# Patient Record
Sex: FEMALE | Race: BLACK OR AFRICAN AMERICAN | Employment: OTHER | URBAN - METROPOLITAN AREA
[De-identification: names, ages, dates, MRNs, and addresses within clinical notes are randomized per-mention and may not be internally consistent; named-entity substitution may affect disease eponyms.]

---

## 2017-04-25 ENCOUNTER — HOSPITAL ENCOUNTER (OUTPATIENT)
Dept: MAMMOGRAPHY | Age: 72
Discharge: HOME | End: 2017-04-25
Attending: OBSTETRICS & GYNECOLOGY
Payer: COMMERCIAL

## 2017-04-25 DIAGNOSIS — Z12.31 VISIT FOR SCREENING MAMMOGRAM: ICD-10-CM

## 2017-04-25 PROCEDURE — 77067 SCR MAMMO BI INCL CAD: CPT

## 2017-06-28 DIAGNOSIS — R00.1 BRADYCARDIA: ICD-10-CM

## 2017-06-28 DIAGNOSIS — B37.31 VAGINAL YEAST INFECTION: ICD-10-CM

## 2017-06-28 DIAGNOSIS — K92.1 HEMATOCHEZIA: ICD-10-CM

## 2017-06-28 DIAGNOSIS — L65.9 HAIR LOSS: ICD-10-CM

## 2017-06-28 DIAGNOSIS — M54.2 NECK PAIN: ICD-10-CM

## 2017-06-28 DIAGNOSIS — R10.9 FLANK PAIN: ICD-10-CM

## 2017-06-28 PROBLEM — H66.90 OTITIS MEDIA, ACUTE: Status: ACTIVE | Noted: 2017-06-28

## 2017-06-28 PROBLEM — S00.03XA TRAUMATIC HEMATOMA OF SCALP: Status: ACTIVE | Noted: 2017-06-28

## 2017-06-28 PROBLEM — I10 HTN (HYPERTENSION): Status: ACTIVE | Noted: 2017-06-28

## 2017-06-28 PROBLEM — J45.909 ASTHMA: Status: ACTIVE | Noted: 2017-06-28

## 2017-06-28 PROBLEM — J18.9 PNEUMONIA: Status: ACTIVE | Noted: 2017-06-28

## 2017-06-28 PROBLEM — M81.0 OSTEOPOROSIS: Status: ACTIVE | Noted: 2017-06-28

## 2017-06-28 PROBLEM — J30.9 ALLERGIC RHINITIS: Status: ACTIVE | Noted: 2017-06-28

## 2017-06-28 PROBLEM — K51.90 ULCERATIVE COLITIS (HCC): Status: ACTIVE | Noted: 2017-06-28

## 2017-06-28 PROBLEM — E78.5 HYPERLIPIDEMIA: Status: ACTIVE | Noted: 2017-06-28

## 2017-06-28 PROBLEM — E87.6 HYPOKALEMIA: Status: ACTIVE | Noted: 2017-06-28

## 2017-06-28 PROBLEM — M19.90 DEGENERATIVE ARTHRITIS: Status: ACTIVE | Noted: 2017-06-28

## 2017-06-28 PROBLEM — H26.9 CATARACT, BILATERAL: Status: ACTIVE | Noted: 2017-06-28

## 2017-06-28 PROBLEM — R53.83 FATIGUE: Status: ACTIVE | Noted: 2017-06-28

## 2017-06-28 PROBLEM — K50.90 CROHN'S DISEASE (HCC): Status: ACTIVE | Noted: 2017-06-28

## 2017-06-28 RX ORDER — ALBUTEROL SULFATE 90 UG/1
AEROSOL, METERED RESPIRATORY (INHALATION) AS NEEDED
COMMUNITY

## 2017-06-28 RX ORDER — PREDNISONE 10 MG/1
TABLET ORAL
COMMUNITY
End: 2017-07-13

## 2017-06-28 RX ORDER — CEFDINIR 300 MG/1
300 CAPSULE ORAL 2 TIMES DAILY
COMMUNITY
End: 2017-07-13 | Stop reason: ALTCHOICE

## 2017-06-28 RX ORDER — CYCLOSPORINE 0.5 MG/ML
1 EMULSION OPHTHALMIC 2 TIMES DAILY
COMMUNITY

## 2017-07-13 ENCOUNTER — OFFICE VISIT (OUTPATIENT)
Dept: INTERNAL MEDICINE CLINIC | Age: 72
End: 2017-07-13

## 2017-07-13 VITALS
DIASTOLIC BLOOD PRESSURE: 70 MMHG | WEIGHT: 142 LBS | BODY MASS INDEX: 26.13 KG/M2 | SYSTOLIC BLOOD PRESSURE: 140 MMHG | HEIGHT: 62 IN

## 2017-07-13 DIAGNOSIS — I10 ESSENTIAL HYPERTENSION: ICD-10-CM

## 2017-07-13 DIAGNOSIS — J45.40 MODERATE PERSISTENT ASTHMA WITHOUT COMPLICATION: ICD-10-CM

## 2017-07-13 DIAGNOSIS — K51.20 ULCERATIVE PROCTITIS WITHOUT COMPLICATION (HCC): Primary | ICD-10-CM

## 2017-07-13 DIAGNOSIS — E78.2 MIXED HYPERLIPIDEMIA: ICD-10-CM

## 2017-07-13 RX ORDER — NYSTATIN AND TRIAMCINOLONE ACETONIDE 100000; 1 [USP'U]/G; MG/G
CREAM TOPICAL AS NEEDED
COMMUNITY
Start: 2017-07-07

## 2017-07-13 RX ORDER — DUREZOL 0.5 MG/ML
1 EMULSION OPHTHALMIC
COMMUNITY
Start: 2017-05-01 | End: 2020-09-21 | Stop reason: ALTCHOICE

## 2017-07-13 RX ORDER — ESTRADIOL 10 UG/1
1 TABLET VAGINAL
COMMUNITY
Start: 2017-06-28 | End: 2021-11-17 | Stop reason: SDUPTHER

## 2017-07-13 RX ORDER — SYRINGE WITH NEEDLE, 1 ML 28GX1/2"
SYRINGE, EMPTY DISPOSABLE MISCELLANEOUS
Refills: 99 | COMMUNITY
Start: 2017-04-19

## 2017-07-13 NOTE — PROGRESS NOTES
Reviewed record in preparation for visit and have obtained necessary documentation. Identified pt with two pt identifiers(name and ). Chief Complaint   Patient presents with    Hypertension     yearaly follow up    Cholesterol Problem        Coordination of Care Questionnaire:  :     1) Have you been to an emergency room, urgent care clinic since your last visit? no   Hospitalized since your last visit? no             2) Have you seen or consulted any other health care providers outside of [address] [place] [address] since your last visit? no  (Include any pap smears or colon screenings in this section.)    No refill at this time.     Here for 6 mo follow up, doing well except for some congestion

## 2017-07-13 NOTE — PROGRESS NOTES
Benita Enrique is a 71 y.o. female and presents with Hypertension (yearaly follow up) and Cholesterol Problem  . Subjective:  Mrs. Maryjo Osler presents for follow up regarding her HTN and other medical problems. She has no headache, SOB, CP, edema or lightheadedness. She is doing well on her medication and is without side effects. She incidentally notes some sinus congestion and drainage from a cold she picked up while out of town last week. This is getting better. Her asthma has not acted up. Review of Systems  Constitutional: negative for fevers, chills, anorexia and weight loss  Eyes:   negative for visual disturbance and irritation  ENT:   negative for tinnitus,sore throat, ear pains. hoarseness  Respiratory:  negative for cough, hemoptysis, dyspnea,wheezing  CV:   negative for chest pain, palpitations, lower extremity edema  GI:   negative for nausea, vomiting, diarrhea, abdominal pain,melena  Endo:               negative for polyuria,polydipsia,polyphagia,heat intolerance  Genitourinary: negative for frequency, dysuria and hematuria  Integumentary: negative for rash and pruritus  Hematologic:  negative for easy bruising and gum/nose bleeding  Musculoskel: negative for myalgias, arthralgias, back pain, muscle weakness, joint pain  Neurological:  negative for headaches, dizziness, vertigo, memory problems and gait   Behavl/Psych: negative for feelings of anxiety, depression, mood changes    Past Medical History:   Diagnosis Date    Allergic rhinitis 6/28/2017    Arthritis     Asthma     Asthma 6/28/2017    Impressio: continue nebs and Dulera, prednisone taper    Bradycardia 6/28/2017    Impression: asymptomatic, continue Bystolic    Cataract, bilateral 6/28/2017    Impression: low risk for elective surgical procedure, proceed without further risk stratification, EKG shows sinus domingo without acute ST-T changes which is her baseline    Crohn's disease ([address]     Crohn's disease ([facility] 6/28/2017 Impression: GI following    Degenerative arthritis 6/28/2017    Impression: trial of topical NSAID, if persists can consider an injection    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, 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    Fatigue 6/28/2017    Flank pain 6/28/2017    Impression: most likely secondary to Crohn's, not cardiac by history, EKG normal Status is Inactive      Gastrointestinal disorder     crones    Hair loss 6/28/2017    Impression: refer to derm    Hematochezia 6/28/2017    Status is Inactive    Herpes Dx 2015    no outbreak as of 5/2/16    Hyperlipidemia 6/28/2017    Hypertension     Hypokalemia 6/28/2017    MRSA (methicillin resistant staph aureus) culture positive \"many years ago\" as of 5/2/16    pt states + nasal swab \"many years ago\", Tx and no + since; UNCONFIRMED    Neck pain 6/28/2017    Impresssion: finish PT, continue HEP, pain med prn    Osteoporosis 6/28/2017    Otitis media, acute 6/28/2017    Impression: follow up next week, finish abx Status is Inactive    Pneumonia 6/28/2017    Impression:  Completed course of antibiotics, clinically resolved Status is Inactive    Traumatic hematoma of scalp 6/28/2017    Status is Inactive    Ulcerative colitis ([facility] 6/28/2017    Vaginal yeast infection 6/28/2017    Impression, consider probiotic, check blood sugar, follow up GYN Status is Inactive     Past Surgical History:   Procedure Laterality Date    HX CATARACT REMOVAL Right 3/28/16    HX MOHS PROCEDURES Right     HX ORTHOPAEDIC Left     left knee sx    HX ORTHOPAEDIC Right     trigger release      HX TUBAL LIGATION       Social History     Social History    Marital status:      Spouse name: N/A    Number of children: N/A    Years of education: N/A     Social History Main Topics    Smoking status: Never Smoker    Smokeless tobacco: Never Used    Alcohol use No    Drug use: No    Sexual activity: No     Other Topics Concern    None     Social History Narrative     Family History   Problem Relation Age of Onset    Breast Cancer Maternal Aunt      ?  Heart Disease Mother     Asthma Father     Stroke Father      Current Outpatient Prescriptions   Medication Sig Dispense Refill    YUVAFEM 10 mcg tab vaginal tablet Insert 1 Tab into vagina every Monday and Friday.  DUREZOL 0.05 % ophthalmic emulsion 1 Drop by Does Not Apply route every Monday and Friday.  nystatin-triamcinolone (MYCOLOG II) topical cream as needed.  ALLERGIST TRAY 1CC 27GX1/2\" 1 mL 27 x 1/2\" syrg every seven (7) days. 99    Nebulizer Accessories kit Use as directed 1 Kit 3    cycloSPORINE (RESTASIS) 0.05 % ophthalmic emulsion Administer 1 Drop to both eyes two (2) times a day.  albuterol (PROAIR HFA) 90 mcg/actuation inhaler Take  by inhalation as needed for Wheezing.  valACYclovir (VALTREX) 1 gram tablet Take 1,000 mg by mouth daily.  nebivolol (BYSTOLIC) 10 mg tablet Take 10 mg by mouth daily.  amLODIPine-Olmesartan 5-40 mg tab Take 1 Tab by mouth daily.  mometasone-formoterol (DULERA) 100-5 mcg/actuation HFA inhaler Take 2 puffs by inhalation two (2) times a day.  mesalamine EC (ASACOL) 400 mg EC tablet Take 800 mg by mouth three (3) times daily.  hyoscyamine SL (LEVSIN/SL) 0.125 mg SL tablet 0.125 mg by SubLINGual route every four (4) hours.  CALCIUM CARBONATE (CALCIUM 300 PO) Take  by mouth daily.  pirbuterol (MAXAIR AUTOHALER) 200 mcg/Inhalation inhaler Take 2 Puffs by inhalation four (4) times daily as needed.  Omeprazole delayed release (PRILOSEC D/R) 20 mg tablet Take 20 mg by mouth daily.  estradiol (VAGIFEM) 25 mcg vaginal tablet Insert 25 mcg into vagina two (2) days a week.  olopatadine (PATANASE) 0.6 % Spry 2 Squirts by Both Nostrils route two (2) times a day.  biotin 2,500 mcg Tab Take 5,000 mcg by mouth daily.          Allergies   Allergen Reactions    Latex Rash    Aldactone [Spironolactone] Hives    Chocolate [Cocoa] Rash    Cinnamon Rash    Hydrocodone Other (comments)     Vomiting, syncope    Lisinopril Hives       Objective:  Visit Vitals    /70 (BP 1 Location: Right arm, BP Patient Position: Sitting)    Ht 5' 2\" (1.575 m)    Wt 142 lb (64.4 kg)    BMI 25.97 kg/m2     Physical Exam:   General appearance - alert, well appearing, and in no distress  Mental status - alert, oriented to person, place, and time  EYE-YUDELKA, EOMI, fundi normal, corneas normal, no foreign bodies  ENT-ENT exam normal, no neck nodes or sinus tenderness  Nose - normal and patent, no erythema, discharge or polyps  Mouth - mucous membranes moist, pharynx normal without lesions  Neck - supple, no significant adenopathy   Chest - clear to auscultation, no wheezes, rales or rhonchi, symmetric air entry   Heart - normal rate, regular rhythm, normal S1, S2, no murmurs, rubs, clicks or gallops   Abdomen - soft, nontender, nondistended, no masses or organomegaly  Lymph- no adenopathy palpable  Ext-peripheral pulses normal, no pedal edema, no clubbing or cyanosis  Skin-Warm and dry. no hyperpigmentation, vitiligo, or suspicious lesions  Neuro -alert, oriented, normal speech, no focal findings or movement disorder noted  Musculoskeletal- FROM, no bony abnormalities, no point tenderness    No results found for this visit on 07/13/17. Assessment/Plan:    Problem List Items Addressed This Visit     Moderate persistent asthma without complication    Ulcerative proctitis without complication ([facility] [address] - Primary    Relevant Orders    AMB POC COMPLETE CBC,AUTOMATED ENTER    Essential hypertension    Relevant Orders    AMB POC COMPREHENSIVE METABOLIC PANEL    AMB POC URINALYSIS DIP STICK AUTO W/ MICRO     Mixed hyperlipidemia    Relevant Orders    AMB POC LIPID PROFILE          There are no Patient Instructions on file for this visit.    Follow-up Disposition: Not on File      I have reviewed with the patient details of the assessment and plan and all questions were answered. Relevent patient education was performed. The most recent lab findings were reviewed with the patient. An After Visit Summary was printed and given to the patient.       Aria Roa MD

## 2017-07-13 NOTE — MR AVS SNAPSHOT
Visit Information Date & Time Provider Department Dept. Phone Encounter #  
 7/13/2017  2:00 PM MANDY Hu MD [facility] [id number] Follow-up Instructions Return in about 6 months (around 1/13/2018) for follow up. Follow-up and Disposition History Your Appointments 7/18/2017 10:00 AM  
LAB with MANDY Hu MD  
Sudarshan Vigil  ([address] [facility]) Appt Note: Fasting labs [address] [id number] [address] [facility] [address] Upcoming Health Maintenance Date Due Hepatitis C Screening 1945 DTaP/Tdap/Td series (1 - Tdap) 5/28/1967 FOBT Q 1 YEAR AGE 50-75 5/28/1996 ZOSTER VACCINE AGE 60> 5/28/2006 GLAUCOMA SCREENING Q2Y 5/28/2011 Pneumococcal 65+ Low/Medium Risk (1 of 2 - PCV13) 5/28/2011 MEDICARE YEARLY EXAM 5/28/2011 INFLUENZA AGE 9 TO ADULT 8/1/2017 BREAST CANCER SCRN MAMMOGRAM 4/25/2019 Allergies as of 7/13/2017  Review Complete On: 7/13/2017 By: Teri Bergeron MD  
  
 Severity Noted Reaction Type Reactions Latex  03/24/2016    Rash Aldactone [Spironolactone]  03/24/2016    Hives Chocolate [Cocoa]  03/24/2016    Rash Cinnamon  05/09/2016    Rash Hydrocodone  03/01/2012    Other (comments) Vomiting, syncope Lisinopril  03/24/2016    Hives Current Immunizations  Never Reviewed No immunizations on file. Not reviewed this visit You Were Diagnosed With   
  
 Codes Comments [facility]ative pro[facility]itis without complication [facility]    -  Primary ICD-10-CM: C62.76 ICD-9-CM: 556.2 Essential hypertension     ICD-10-CM: I10 
ICD-9-CM: 401.9 Mixed hyperlipidemia     ICD-10-CM: E78.2 ICD-9-CM: 272.2 Moderate persistent asthma without complication     BKQ-87-JN: J45.40 ICD-9-CM: 493.90 Vitals BP Height(growth percentile) Weight(growth percentile) BMI OB Status Smoking Status 140/70 5' 2\" (1.575 m) 142 lb (64.4 kg) 25.97 kg/m2 Postmenopausal Never Smoker Vitals History BMI and BSA Data Body Mass Index Body Surface Area  
 25.97 kg/m 2 1.68 m 2 Preferred Pharmacy Pharmacy Name Phone [id number]- [place], [place] - [address] [phone] Your Updated Medication List  
  
   
This list is accurate as of: 7/13/17  4:41 PM.  Always use your most recent med list.  
  
  
  
  
 Allergist Tray 1cc 27Gx1/2\" 1 mL 27 x 1/2\" Syrg Generic drug:  Syringe with Needle (Disp)  
every seven (7) days. amLODIPine-Olmesartan 5-40 mg Tab Take 1 Tab by mouth daily. ASACOL 400 mg EC tablet Generic drug:  mesalamine EC Take 800 mg by mouth three (3) times daily. biotin 2,500 mcg Tab Take 5,000 mcg by mouth daily. CALCIUM 300 PO Take  by mouth daily. DULERA 100-5 mcg/actuation HFA inhaler Generic drug:  mometasone-formoterol Take 2 puffs by inhalation two (2) times a day. DUREZOL 0.05 % ophthalmic emulsion Generic drug:  Difluprednate 1 Drop by Does Not Apply route every Monday and Friday. * YUVAFEM 10 mcg Tab vaginal tablet Generic drug:  estradiol Insert 1 Tab into vagina every Monday and Friday. * estradiol 25 mcg vaginal tablet Commonly known as:   Pew Insert 25 mcg into vagina two (2) days a week.  
  
 hyoscyamine SL 0.125 mg SL tablet Commonly known as:  LEVSIN/SL  
0.125 mg by SubLINGual route every four (4) hours. MAXAIR AUTOHALER 200 mcg/Inhalation inhaler Generic drug:  pirbuterol Take 2 Puffs by inhalation four (4) times daily as needed. nebivolol 10 mg tablet Commonly known as:  BYSTOLIC Take 10 mg by mouth daily. Nebulizer Accessories Kit Use as directed  
  
 nystatin-triamcinolone topical cream  
 Commonly known as:  MYCOLOG II  
as needed. Omeprazole delayed release 20 mg tablet Commonly known as:  PRILOSEC D/R Take 20 mg by mouth daily. PATANASE 0.6 % Spry Generic drug:  olopatadine 2 Squirts by Both Nostrils route two (2) times a day. PROAIR HFA 90 mcg/actuation inhaler Generic drug:  albuterol Take  by inhalation as needed for Wheezing. RESTASIS 0.05 % ophthalmic emulsion Generic drug:  cycloSPORINE Administer 1 Drop to both eyes two (2) times a day. valACYclovir 1 gram tablet Commonly known as:  VALTREX Take 1,000 mg by mouth daily. * Notice: This list has 2 medication(s) that are the same as other medications prescribed for you. Read the directions carefully, and ask your doctor or other care provider to review them with you. Prescriptions Printed Refills Nebulizer Accessories kit 3 Sig: Use as directed Class: Print Follow-up Instructions Return in about 6 months (around 1/13/2018) for follow up. To-Do List   
 07/14/2017 Point of Care Testing:  AMB POC COMPLETE CBC,AUTOMATED ENTER   
  
 07/14/2017 Point of Care Testing:  AMB POC COMPREHENSIVE METABOLIC PANEL   
  
 49/82/7003 Point of Care Testing:  AMB POC LIPID PROFILE   
  
 07/14/2017 Point of Care Testing:  AMB POC URINALYSIS DIP STICK AUTO W/ MICRO Patient Instructions DASH Diet: Care Instructions Your Care Instructions The DASH diet is an eating plan that can help lower your blood pressure. DASH stands for Dietary Approaches to Stop Hypertension. Hypertension is high blood pressure. The DASH diet focuses on eating foods that are high in calcium, potassium, and magnesium. These nutrients can lower blood pressure. The foods that are highest in these nutrients are fruits, vegetables, low-fat dairy products, nuts, seeds, and legumes.  But taking calcium, potassium, and magnesium supplements instead of eating foods that are high in those nutrients does not have the same effect. The DASH diet also includes whole grains, fish, and poultry. The DASH diet is one of several lifestyle changes your doctor may recommend to lower your high blood pressure. Your doctor may also want you to decrease the amount of sodium in your diet. Lowering sodium while following the DASH diet can lower blood pressure even further than just the DASH diet alone. Follow-up care is a key part of your treatment and safety. Be sure to make and go to all appointments, and call your doctor if you are having problems. It's also a good idea to know your test results and keep a list of the medicines you take. How can you care for yourself at home? Following the DASH diet · Eat 4 to 5 servings of fruit each day. A serving is 1 medium-sized piece of fruit, ½ cup chopped or canned fruit, 1/4 cup dried fruit, or 4 ounces (½ cup) of fruit juice. Choose fruit more often than fruit juice. · Eat 4 to 5 servings of vegetables each day. A serving is 1 cup of lettuce or raw leafy vegetables, ½ cup of chopped or cooked vegetables, or 4 ounces (½ cup) of vegetable juice. Choose vegetables more often than vegetable juice. · Get 2 to 3 servings of low-fat and fat-free dairy each day. A serving is 8 ounces of milk, 1 cup of yogurt, or 1 ½ ounces of cheese. · Eat 6 to 8 servings of grains each day. A serving is 1 slice of bread, 1 ounce of dry cereal, or ½ cup of cooked rice, pasta, or cooked cereal. Try to choose whole-grain products as much as possible. · Limit lean meat, poultry, and fish to 2 servings each day. A serving is 3 ounces, about the size of a deck of cards. · Eat 4 to 5 servings of nuts, seeds, and legumes (cooked dried beans, lentils, and split peas) each week. A serving is 1/3 cup of nuts, 2 tablespoons of seeds, or ½ cup of cooked beans or peas. · Limit fats and oils to 2 to 3 servings each day. A serving is 1 teaspoon of vegetable oil or 2 tablespoons of salad dressing. · Limit sweets and added sugars to 5 servings or less a week. A serving is 1 tablespoon jelly or jam, ½ cup sorbet, or 1 cup of lemonade. · Eat less than 2,300 milligrams (mg) of sodium a day. If you limit your sodium to 1,500 mg a day, you can lower your blood pressure even more. Tips for success · Start small. Do not try to make dramatic changes to your diet all at once. You might feel that you are missing out on your favorite foods and then be more likely to not follow the plan. Make small changes, and stick with them. Once those changes become habit, add a few more changes. · Try some of the following: ¨ Make it a goal to eat a fruit or vegetable at every meal and at snacks. This will make it easy to get the recommended amount of fruits and vegetables each day. ¨ Try yogurt topped with fruit and nuts for a snack or healthy dessert. ¨ Add lettuce, tomato, cucumber, and onion to sandwiches. ¨ Combine a ready-made pizza crust with low-fat mozzarella cheese and lots of vegetable toppings. Try using tomatoes, squash, spinach, broccoli, carrots, cauliflower, and onions. ¨ Have a variety of cut-up vegetables with a low-fat dip as an appetizer instead of chips and dip. ¨ Sprinkle sunflower seeds or chopped almonds over salads. Or try adding chopped walnuts or almonds to cooked vegetables. ¨ Try some vegetarian meals using beans and peas. Add garbanzo or kidney beans to salads. Make burritos and tacos with mashed prince beans or black beans. Where can you learn more? Go to http://helio-patricia.info/. Enter K056 in the search box to learn more about \"DASH Diet: Care Instructions. \" Current as of: April 3, 2017 Content Version: 11.3 © 4702-2899 [employer].  Care instructions adapted under license by [address] (which disclaims liability or warranty for this information). If you have questions about a medical condition or this instruction, always ask your healthcare professional. [address] any warranty or liability for your use of this information. Introducing [address] [facility] introduces hyaqu patient portal. Now you can access parts of your medical record, email your doctor's office, and request medication refills online. 1. In your internet browser, go to [email or web address] 2. Click on the First Time User? Click Here link in the Sign In box. You will see the New Member Sign Up page. 3. Enter your hyaqu Access Code exactly as it appears below. You will not need to use this code after youve completed the sign-up process. If you do not sign up before the expiration date, you must request a new code. · hyaqu Access Code: [email or web address] Expires: 10/11/2017  1:48 PM 
 
4. Enter the last four digits of your Social Security Number (xxxx) and Date of Birth (mm/dd/yyyy) as indicated and click Submit. You will be taken to the next sign-up page. 5. Create a hyaqu ID. This will be your hyaqu login ID and cannot be changed, so think of one that is secure and easy to remember. 6. Create a hyaqu password. You can change your password at any time. 7. Enter your Password Reset Question and Answer. This can be used at a later time if you forget your password. 8. Enter your e-mail address. You will receive e-mail notification when new information is available in [address] 9. Click Sign Up. You can now view and download portions of your medical record. 10. Click the Download Summary menu link to download a portable copy of your medical information. If you have questions, please visit the Frequently Asked Questions section of the hyaqu website.  Remember, hyaqu is NOT to be used for urgent needs. For medical emergencies, dial 911. Now available from your iPhone and Android! Please provide this summary of care documentation to your next provider. Your primary care clinician is listed as MANDY Walsh. If you have any questions after today's visit, please call [phone].

## 2017-07-13 NOTE — PATIENT INSTRUCTIONS

## 2017-07-18 ENCOUNTER — APPOINTMENT (OUTPATIENT)
Dept: INTERNAL MEDICINE CLINIC | Age: 72
End: 2017-07-18

## 2017-07-18 DIAGNOSIS — E78.2 MIXED HYPERLIPIDEMIA: ICD-10-CM

## 2017-07-18 DIAGNOSIS — K51.20 ULCERATIVE PROCTITIS WITHOUT COMPLICATION (HCC): ICD-10-CM

## 2017-07-18 DIAGNOSIS — I10 ESSENTIAL HYPERTENSION: ICD-10-CM

## 2017-07-18 LAB
ALBUMIN SERPL-MCNC: 4.1 G/DL (ref 3.9–5.4)
ALKALINE PHOS POC: 90 U/L (ref 38–126)
ALT SERPL-CCNC: 28 U/L (ref 9–52)
AST SERPL-CCNC: 21 U/L (ref 14–36)
BACTERIA UA POCT, BACTPOCT: NORMAL
BILIRUB UR QL STRIP: NEGATIVE
BUN BLD-MCNC: 13 MG/DL (ref 7–17)
CALCIUM BLD-MCNC: 9.6 MG/DL (ref 8.4–10.2)
CASTS UA POCT: 0
CHLORIDE BLD-SCNC: 106 MMOL/L (ref 98–107)
CHOLEST SERPL-MCNC: 239 MG/DL (ref 0–200)
CLUE CELLS, CLUEPOCT: NEGATIVE
CO2 POC: 28 MMOL/L (ref 22–32)
CREAT BLD-MCNC: 0.8 MG/DL (ref 0.7–1.2)
CRYSTALS UA POCT, CRYSPOCT: NEGATIVE
EGFR (POC): 74.2
EPITHELIAL CELLS POCT, EPITHPOCT: NORMAL
GLUCOSE POC: 90 MG/DL (ref 65–105)
GLUCOSE UR-MCNC: NEGATIVE MG/DL
GRAN# POC: 4.3 K/UL (ref 2–7.8)
GRAN% POC: 56.6 % (ref 37–92)
HCT VFR BLD CALC: 39.6 %
HDLC SERPL-MCNC: 89 MG/DL (ref 35–130)
HGB BLD-MCNC: 11.9 G/DL (ref 12–18)
KETONES P FAST UR STRIP-MCNC: NEGATIVE MG/DL
LDL CHOLESTEROL POC: 137 MG/DL (ref 0–130)
LY# POC: 2.6 K/UL (ref 0.6–4.1)
LY% POC: 37.2 % (ref 10–58.5)
MCH RBC QN: 21.5 PG (ref 26–32)
MCHC RBC-ENTMCNC: 30.1 G/DL (ref 30–36)
MCV RBC: 71 FL (ref 80–97)
MID #, POC: 0.4 K/UL (ref 0–1.8)
MID% POC: 6.2 % (ref 0.1–24)
MUCUS UA POCT, MUCPOCT: NORMAL
PH UR STRIP: 6 [PH] (ref 4.6–8)
PLATELET # BLD: 312 K/UL (ref 140–440)
POTASSIUM SERPL-SCNC: 4.4 MMOL/L (ref 3.6–5)
PROT SERPL-MCNC: 7.3 G/DL (ref 6.3–8.2)
PROTEIN,URINE POC: NORMAL MG/DL
RBC # BLD: 5.55 M/UL (ref 4.2–6.3)
RBC UA POCT, RBCPOCT: NORMAL
SODIUM SERPL-SCNC: 146 MMOL/L (ref 137–145)
SP GR UR STRIP: 1.01 (ref 1–1.03)
TCHOL/HDL RATIO (POC): 2.7 (ref 0–4)
TOTAL BILIRUBIN POC: 0.6 MG/DL (ref 0.2–1.3)
TRICH UA POCT, TRICHPOC: NEGATIVE
TRIGL SERPL-MCNC: 65 MG/DL (ref 0–200)
UA UROBILINOGEN AMB POC: NORMAL (ref 0.2–1)
URINALYSIS CLARITY POC: CLEAR
URINALYSIS COLOR POC: NORMAL
URINE BLOOD POC: NEGATIVE
URINE LEUKOCYTES POC: NORMAL
URINE NITRITES POC: NEGATIVE
VLDLC SERPL CALC-MCNC: 13 MG/DL
WBC # BLD: 7.3 K/UL (ref 4.1–10.9)
WBC UA POCT, WBCPOCT: NORMAL
YEAST UA POCT, YEASTPOC: NEGATIVE

## 2017-10-23 RX ORDER — NEBIVOLOL HYDROCHLORIDE 10 MG/1
TABLET ORAL
Qty: 90 TAB | Refills: 3 | Status: SHIPPED | OUTPATIENT
Start: 2017-10-23 | End: 2018-10-02 | Stop reason: SDUPTHER

## 2018-01-24 RX ORDER — AMLODIPINE BESYLATE AND OLMESARTAN MEDOXOMIL 5; 40 MG/1; MG/1
TABLET, FILM COATED ORAL
Qty: 90 TAB | Refills: 3 | Status: SHIPPED | OUTPATIENT
Start: 2018-01-24 | End: 2018-01-24 | Stop reason: SDUPTHER

## 2018-02-02 ENCOUNTER — OFFICE VISIT (OUTPATIENT)
Dept: INTERNAL MEDICINE CLINIC | Age: 73
End: 2018-02-02

## 2018-02-02 VITALS
HEART RATE: 59 BPM | HEIGHT: 62 IN | DIASTOLIC BLOOD PRESSURE: 84 MMHG | BODY MASS INDEX: 26.46 KG/M2 | OXYGEN SATURATION: 98 % | WEIGHT: 143.8 LBS | TEMPERATURE: 98.1 F | RESPIRATION RATE: 18 BRPM | SYSTOLIC BLOOD PRESSURE: 158 MMHG

## 2018-02-02 DIAGNOSIS — I10 ESSENTIAL HYPERTENSION: ICD-10-CM

## 2018-02-02 DIAGNOSIS — Z13.6 SCREENING FOR ISCHEMIC HEART DISEASE: ICD-10-CM

## 2018-02-02 DIAGNOSIS — Z13.39 SCREENING FOR ALCOHOLISM: ICD-10-CM

## 2018-02-02 DIAGNOSIS — Z13.1 SCREENING FOR DIABETES MELLITUS: ICD-10-CM

## 2018-02-02 DIAGNOSIS — Z13.31 SCREENING FOR DEPRESSION: ICD-10-CM

## 2018-02-02 DIAGNOSIS — Z11.59 NEED FOR HEPATITIS C SCREENING TEST: ICD-10-CM

## 2018-02-02 DIAGNOSIS — R00.1 BRADYCARDIA: ICD-10-CM

## 2018-02-02 DIAGNOSIS — Z00.00 MEDICARE ANNUAL WELLNESS VISIT, SUBSEQUENT: Primary | ICD-10-CM

## 2018-02-02 LAB
ALBUMIN SERPL-MCNC: 4.4 G/DL (ref 3.9–5.4)
ALKALINE PHOS POC: 75 U/L (ref 38–126)
ALT SERPL-CCNC: 24 U/L (ref 9–52)
AST SERPL-CCNC: 20 U/L (ref 14–36)
BACTERIA UA POCT, BACTPOCT: NORMAL
BILIRUB UR QL STRIP: NEGATIVE
BUN BLD-MCNC: 12 MG/DL (ref 7–17)
CALCIUM BLD-MCNC: 9.8 MG/DL (ref 8.4–10.2)
CASTS UA POCT: 0
CHLORIDE BLD-SCNC: 105 MMOL/L (ref 98–107)
CLUE CELLS, CLUEPOCT: NEGATIVE
CO2 POC: 29 MMOL/L (ref 22–32)
CREAT BLD-MCNC: 0.9 MG/DL (ref 0.7–1.2)
CRYSTALS UA POCT, CRYSPOCT: NEGATIVE
EGFR (POC): 64.3
EPITHELIAL CELLS POCT: NEGATIVE
GLUCOSE POC: 93 MG/DL (ref 65–105)
GLUCOSE UR-MCNC: NEGATIVE MG/DL
KETONES P FAST UR STRIP-MCNC: NEGATIVE MG/DL
MUCUS UA POCT, MUCPOCT: NORMAL
PH UR STRIP: 6.5 [PH] (ref 5–7)
POTASSIUM SERPL-SCNC: 4.3 MMOL/L (ref 3.6–5)
PROT SERPL-MCNC: 7.8 G/DL (ref 6.3–8.2)
PROT UR QL STRIP: NEGATIVE
RBC UA POCT, RBCPOCT: 0
SODIUM SERPL-SCNC: 144 MMOL/L (ref 137–145)
SP GR UR STRIP: 1.01 (ref 1.01–1.02)
TOTAL BILIRUBIN POC: 0.7 MG/DL (ref 0.2–1.3)
TRICH UA POCT, TRICHPOC: NEGATIVE
UA UROBILINOGEN AMB POC: NORMAL (ref 0.2–1)
URINALYSIS CLARITY POC: CLEAR
URINALYSIS COLOR POC: NORMAL
URINE BLOOD POC: NEGATIVE
URINE CULT COMMENT, POCT: NORMAL
URINE LEUKOCYTES POC: NEGATIVE
URINE NITRITES POC: NEGATIVE
WBC UA POCT, WBCPOCT: 0
YEAST UA POCT, YEASTPOC: NEGATIVE

## 2018-02-02 RX ORDER — MESALAMINE 800 MG/1
TABLET, DELAYED RELEASE ORAL 2 TIMES DAILY
COMMUNITY
Start: 2017-12-03 | End: 2020-09-21 | Stop reason: SDUPTHER

## 2018-02-02 NOTE — PATIENT INSTRUCTIONS
Medicare Wellness Visit, Female    The best way to live healthy is to have a healthy lifestyle by eating a well-balanced diet, exercising regularly, limiting alcohol and stopping smoking. Regular physical exams and screening tests are another way to keep healthy. Preventive exams provided by your health care provider can find health problems before they become diseases or illnesses. Preventive services including immunizations, screening tests, monitoring and exams can help you take care of your own health. All people over age 72 should have a pneumovax  and and a prevnar shot to prevent pneumonia. These are once in a lifetime unless you and your provider decide differently. All people over 65 should have a yearly flu shot and a tetanus vaccine every 10 years. A bone mass density to screen for osteoporosis or thinning of the bones should be done every 2 years after 65. Screening for diabetes mellitus with a blood sugar test should be done every year. Glaucoma is a disease of the eye due to increased ocular pressure that can lead to blindness and it should be done every year by an eye professional.    Cardiovascular screening tests that check for elevated lipids (fatty part of blood) which can lead to heart disease and strokes should be done every 5 years. Colorectal screening that evaluates for blood or polyps in your colon should be done yearly as a stool test or every five years as a flexible sigmoidoscope or every 10 years as a colonoscopy up to age 76. Breast cancer screening with a mammogram is recommended biennially  for women age 54-69. Screening for cervical cancer with a pap smear and pelvic exam is recommended for women after age 72 years every 2 years up to age 79 or when the provider and patient decide to stop. If there is a history of cervical abnormalities or other increased risk for cancer then the test is recommended yearly.     Hepatitis C screening is also recommended for anyone born between [birth year] through [address]. A shingles vaccine is also recommended once in a lifetime after age 61. Your Medicare Wellness Exam is recommended annually. Here is a list of your current Health Maintenance items with a due date:  Health Maintenance Due   Topic Date Due    Hepatitis C Test  1945    DTaP/Tdap/Td  (1 - Tdap) 05/28/1967    Stool testing for trace blood  05/28/1996    Shingles Vaccine  03/28/2006    Glaucoma Screening   05/28/2011    Pneumococcal Vaccine (1 of 2 - PCV13) 05/28/2011    Annual Well Visit  05/28/2011    Flu Vaccine  08/01/2017       All Medicare beneficiaries aged 48 and older are covered; however, when a beneficiary is at high risk, there is no minimum age required to receive a screening colonoscopy or a barium enema rendered as an alternative to a screening colonoscopy. The following are the coverage criteria for each colorectal cancer screening test/procedure. Screening FOBT  Medicare provides coverage of a screening FOBT annually (i.e., at least 11 months have passed following the month in which the last covered screening FOBT was performed) for beneficiaries aged 48 and older. This screening requires a written order from the beneficiarys attending physician. NOTE: Medicare will only provide coverage for one FOBT per year: either HCPCS code  or CPT code 66436, but not both. Screening Colonoscopy  For Beneficiaries at [address] [facility] for Developing Colorectal Cancer  Medicare provides coverage of a screening colonoscopy (HCPCS code ) once every 2 years for beneficiaries at high risk for developing colorectal cancer (i.e., at least 23 months have passed following the month in which the last covered screening colonoscopy [HCPCS code ] was performed).     For Beneficiaries Not at [address] [place] [address] for Developing Colorectal Cancer  Medicare provides coverage of a screening colonoscopy (HCPCS code ) for beneficiaries who do not meet the criteria for being at high risk for developing colorectal cancer once every 10 years (i.e., at least 119 months have passed following the month in which the last covered screening [facility]). If the beneficiary otherwise qualifies to have a covered screening colonoscopy (HCPCS code ) based on the above but has had a covered screening flexible sigmoidoscopy ([facility] ), then Medicare may cover a screening colonoscopy ([facility] ) only after at least 47 months have passed following the month in which the last covered screening flexible sigmoidoscopy ([facility] ) was performed.

## 2018-02-02 NOTE — PROGRESS NOTES
This is a Subsequent Medicare Annual Wellness Exam (AWV) (Performed 12 months after IPPE or effective date of Medicare Part B enrollment)    I have reviewed the patient's medical history in detail and updated the computerized patient record.      History     Past Medical History:   Diagnosis Date    Allergic rhinitis 6/28/2017    Arthritis     Asthma     Asthma 6/28/2017    Impressio: continue nebs and Dulera, prednisone taper    Bradycardia 6/28/2017    Impression: asymptomatic, continue Bystolic    Cataract, bilateral 6/28/2017    Impression: low risk for elective surgical procedure, proceed without further risk stratification, EKG shows sinus domingo without acute ST-T changes which is her baseline    Cro[facility]'s disease ([facility] [address]     Crohn's disease ([facility] [address] 6/28/2017    Impression: GI following    Degenerative arthritis 6/28/2017    Impression: trial of topical NSAID, if persists can consider an injection    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, 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    Fatigue 6/28/2017    Flank pain 6/28/2017    Impression: most likely secondary to Crohn's, not cardiac by history, EKG normal Status is Inactive      Gastrointestinal disorder     crones    Hair loss 6/28/2017    Impression: refer to derm    Hematochezia 6/28/2017    Status is Inactive    Herpes Dx 2015    no outbreak as of 5/2/16    Hyperlipidemia 6/28/2017    Hypertension     Hypokalemia 6/28/2017    MRSA (methicillin resistant staph aureus) culture positive \"many years ago\" as of 5/2/16    pt states + nasal swab \"many years ago\", Tx and no + since; UNCONFIRMED    Neck pain 6/28/2017    Impresssion: finish PT, continue HEP, pain med prn    Osteoporosis 6/28/2017    Otitis media, acute 6/28/2017    Impression: follow up next week, finish abx Status is Inactive    Pneumonia 6/28/2017    Impression:  Completed course of antibiotics, clinically resolved Status is Inactive    Traumatic hematoma of scalp 6/28/2017 Status is Inactive    Ulcerative colitis ([facility] [address] 6/28/2017    Vaginal yeast infection 6/28/2017    Impression, consider probiotic, check blood sugar, follow up GYN Status is Inactive      Past Surgical History:   Procedure Laterality Date    HX CATARACT REMOVAL Right 3/28/16    HX MOHS PROCEDURES Right     HX ORTHOPAEDIC Left     left knee sx    HX ORTHOPAEDIC Right     trigger release      HX TUBAL LIGATION       Current Outpatient Prescriptions   Medication Sig Dispense Refill    mesalamine DR (ASACOL HD) 800 mg DR tablet two (2) times a day.  amLODIPine (NORVASC) 5 mg tablet Take 1 Tab by mouth daily. 90 Tab 3    olmesartan (BENICAR) 40 mg tablet Take 1 Tab by mouth daily. 90 Tab 3    BYSTOLIC 10 mg tablet TAKE 1 TABLET DAILY 90 Tab 3    YUVAFEM 10 mcg tab vaginal tablet Insert 1 Tab into vagina every Monday and Friday.  nystatin-triamcinolone (MYCOLOG II) topical cream as needed.  ALLERGIST TRAY 1CC 27GX1/2\" 1 mL 27 x 1/2\" syrg every seven (7) days. 99    Nebulizer Accessories kit Use as directed 1 Kit 3    cycloSPORINE (RESTASIS) 0.05 % ophthalmic emulsion Administer 1 Drop to both eyes two (2) times a day.  albuterol (PROAIR HFA) 90 mcg/actuation inhaler Take  by inhalation as needed for Wheezing.  valACYclovir (VALTREX) 1 gram tablet Take 1,000 mg by mouth daily.  mometasone-formoterol (DULERA) 100-5 mcg/actuation HFA inhaler Take 2 puffs by inhalation two (2) times a day.  hyoscyamine SL (LEVSIN/SL) 0.125 mg SL tablet 0.125 mg by SubLINGual route every four (4) hours.  CALCIUM CARBONATE (CALCIUM 300 PO) Take 600 mg by mouth daily.  Omeprazole delayed release (PRILOSEC D/R) 20 mg tablet Take 20 mg by mouth daily.  olopatadine (PATANASE) 0.6 % Spry 2 Squirts by Both Nostrils route two (2) times a day.  DUREZOL 0.05 % ophthalmic emulsion 1 Drop by Does Not Apply route every Monday and Friday.       mesalamine EC (ASACOL) 400 mg EC tablet Take 800 mg by mouth three (3) times daily.  pirbuterol (MAXAIR AUTOHALER) 200 mcg/Inhalation inhaler Take 2 Puffs by inhalation four (4) times daily as needed.  estradiol (VAGIFEM) 25 mcg vaginal tablet Insert 25 mcg into vagina two (2) days a week.  biotin 2,500 mcg Tab Take 5,000 mcg by mouth daily. Allergies   Allergen Reactions    Latex Rash    Aldactone [Spironolactone] Hives    Chocolate [Cocoa] Rash    Cinnamon Rash    Hydrocodone Other (comments)     Vomiting, syncope    Lisinopril Hives     Family History   Problem Relation Age of Onset    Breast Cancer Maternal Aunt      ?  Heart Disease Mother     Asthma Father     Stroke Father      Social History   Substance Use Topics    Smoking status: Never Smoker    Smokeless tobacco: Never Used    Alcohol use No     Patient Active Problem List   Diagnosis Code    Neck pain M54.2    Hair loss L65.9    Allergic rhinitis J30.9    Ulcerative colitis ([facility] [address] K51.90    Bradycardia R00.1    Cataract, bilateral H26.9    Degenerative arthritis M19.90    HTN (hypertension) I10    Crohn's disease ([address] K50.90    Fatigue R53.83    Hyperlipidemia E78.5    Osteoporosis M81.0    Asthma J45.909    Hematochezia K92.1    Traumatic hematoma of scalp S00. 03XA    Flank pain R10.9    Pneumonia J18.9    Otitis media, acute H66.90    Vaginal yeast infection B37.3    Hypokalemia E87.6    Moderate persistent asthma without complication M27.81    Ulcerative proctitis without complication ([address] F42.75    Essential hypertension I10    Mixed hyperlipidemia E78.2       Depression Risk Factor Screening:     PHQ over the last two weeks 7/13/2017   Little interest or pleasure in doing things Not at all   Feeling down, depressed or hopeless Not at all   Total Score PHQ 2 0     Alcohol Risk Factor Screening: You do not drink alcohol or very rarely.     Functional Ability and Level of Safety:   Hearing Loss  Hearing is good.    Activities of Daily Living  The home contains: no safety equipment. Patient does total self care    Fall Risk  Fall Risk Assessment, last 12 mths 7/13/2017   Able to walk? Yes   Fall in past 12 months? No       Abuse Screen  Patient is not abused    Cognitive Screening   Evaluation of Cognitive Function:  Has your family/caregiver stated any concerns about your memory: no  Normal    Patient Care Team   Patient Care Team:  Julienne Sabillon MD as PCP - General (Internal Medicine)    Assessment/Plan   Education and counseling provided:  Are appropriate based on today's review and evaluation    Diagnoses and all orders for this visit:    1. Essential hypertension    2. Bradycardia    3. Medicare annual wellness visit, subsequent    4. Screening for alcoholism  -     Annual  Alcohol Screen 15 min ()    5. Screening for depression  -     Depression Screen Annual    6. Screening for diabetes mellitus    7. Screening for ischemic heart disease    8. Need for hepatitis C screening test  -     HEPATITIS C AB        Health Maintenance Due   Topic Date Due    Hepatitis C Screening  1945    DTaP/Tdap/Td series (1 - Tdap) 05/28/1967    FOBT Q 1 YEAR AGE 50-75  05/28/1996    ZOSTER VACCINE AGE 60>  03/28/2006    GLAUCOMA SCREENING Q2Y  05/28/2011    Pneumococcal 65+ Low/Medium Risk (1 of 2 - PCV13) 05/28/2011    MEDICARE YEARLY EXAM  05/28/2011    Influenza Age 9 to Adult  08/01/2017     This note will not be viewable in [address] Karolina Jones is a 71 y.o. female and presents with Hypertension (6 month follow up; Room 8)  . Subjective:  Mrs. Quoc Duval presents today for follow-up comprehensive physical exam and review of medical problems include hypertension, hyperlipidemia, history of Crohn's disease. She continues to follow-up with GI on a regular basis regarding her Crohn's. She is doing well on her current medical regimen and denies any side effects from her medicine.   She has had no shortness of breath, chest pain, PND, orthopnea, or pedal edema. Review of Systems  Constitutional: negative for fevers, chills, anorexia and weight loss  Eyes:   negative for visual disturbance and irritation  ENT:   negative for tinnitus,sore throat,nasal congestion,ear pains. hoarseness  Respiratory:  negative for cough, hemoptysis, dyspnea,wheezing  CV:   negative for chest pain, palpitations, lower extremity edema  GI:   negative for nausea, vomiting, diarrhea, abdominal pain,melena  Endo:               negative for polyuria,polydipsia,polyphagia,heat intolerance  Genitourinary: negative for frequency, dysuria and hematuria  Integumentary: negative for rash and pruritus  Hematologic:  negative for easy bruising and gum/nose bleeding  Musculoskel: negative for myalgias, arthralgias, back pain, muscle weakness, joint pain  Neurological:  negative for headaches, dizziness, vertigo, memory problems and gait   Behavl/Psych: negative for feelings of anxiety, depression, mood changes    Past Medical History:   Diagnosis Date    Allergic rhinitis 6/28/2017    Arthritis     Asthma     Asthma 6/28/2017    Impressio: continue nebs and Dulera, prednisone taper    Bradycardia 6/28/2017    Impression: asymptomatic, continue Bystolic    Cataract, bilateral 6/28/2017    Impression: low risk for elective surgical procedure, proceed without further risk stratification, EKG shows sinus domingo without acute ST-T changes which is her baseline    Crohn's disease ([address]     Crohn's disease ([facility] [address] 6/28/2017    Impression: GI following    Degenerative arthritis 6/28/2017    Impression: trial of topical NSAID, if persists can consider an injection    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, 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    Fatigue 6/28/2017    Flank pain 6/28/2017    Impression: most likely secondary to Crohn's, not cardiac by history, EKG normal Status is Inactive      Gastrointestinal disorder     crones  Hair loss 6/28/2017    Impression: refer to derm    Hematochezia 6/28/2017    Status is Inactive    Herpes Dx 2015    no outbreak as of 5/2/16    Hyperlipidemia 6/28/2017    Hypertension     Hypokalemia 6/28/2017    MRSA (methicillin resistant staph aureus) culture positive \"many years ago\" as of 5/2/16    pt states + nasal swab \"many years ago\", Tx and no + since; UNCONFIRMED    Neck pain 6/28/2017    Impresssion: finish PT, continue HEP, pain med prn    Osteoporosis 6/28/2017    Otitis media, acute 6/28/2017    Impression: follow up next week, finish abx Status is Inactive    Pneumonia 6/28/2017    Impression:  Completed course of antibiotics, clinically resolved Status is Inactive    Traumatic hematoma of scalp 6/28/2017    Status is Inactive    Ulcerative colitis ([facility] [address] 6/28/2017    Vaginal yeast infection 6/28/2017    Impression, consider probiotic, check blood sugar, follow up GYN Status is Inactive     Past Surgical History:   Procedure Laterality Date    HX CATARACT REMOVAL Right 3/28/16    HX MOHS PROCEDURES Right     HX ORTHOPAEDIC Left     left knee sx    HX ORTHOPAEDIC Right     trigger release      HX TUBAL LIGATION       Social History     Social History    Marital status:      Spouse name: N/A    Number of children: N/A    Years of education: N/A     Social History Main Topics    Smoking status: Never Smoker    Smokeless tobacco: Never Used    Alcohol use No    Drug use: No    Sexual activity: No     Other Topics Concern    None     Social History Narrative     Family History   Problem Relation Age of Onset    Breast Cancer Maternal Aunt      ?  Heart Disease Mother     Asthma Father     Stroke Father      Current Outpatient Prescriptions   Medication Sig Dispense Refill    mesalamine DR (ASACOL HD) 800 mg DR tablet two (2) times a day.  amLODIPine (NORVASC) 5 mg tablet Take 1 Tab by mouth daily.  90 Tab 3    olmesartan (BENICAR) 40 mg tablet Take 1 Tab by mouth daily. 90 Tab 3    BYSTOLIC 10 mg tablet TAKE 1 TABLET DAILY 90 Tab 3    YUVAFEM 10 mcg tab vaginal tablet Insert 1 Tab into vagina every Monday and Friday.  nystatin-triamcinolone (MYCOLOG II) topical cream as needed.  ALLERGIST TRAY 1CC 27GX1/2\" 1 mL 27 x 1/2\" syrg every seven (7) days. 99    Nebulizer Accessories kit Use as directed 1 Kit 3    cycloSPORINE (RESTASIS) 0.05 % ophthalmic emulsion Administer 1 Drop to both eyes two (2) times a day.  albuterol (PROAIR HFA) 90 mcg/actuation inhaler Take  by inhalation as needed for Wheezing.  valACYclovir (VALTREX) 1 gram tablet Take 1,000 mg by mouth daily.  mometasone-formoterol (DULERA) 100-5 mcg/actuation HFA inhaler Take 2 puffs by inhalation two (2) times a day.  hyoscyamine SL (LEVSIN/SL) 0.125 mg SL tablet 0.125 mg by SubLINGual route every four (4) hours.  CALCIUM CARBONATE (CALCIUM 300 PO) Take 600 mg by mouth daily.  Omeprazole delayed release (PRILOSEC D/R) 20 mg tablet Take 20 mg by mouth daily.  olopatadine (PATANASE) 0.6 % Spry 2 Squirts by Both Nostrils route two (2) times a day.  DUREZOL 0.05 % ophthalmic emulsion 1 Drop by Does Not Apply route every Monday and Friday.  mesalamine EC (ASACOL) 400 mg EC tablet Take 800 mg by mouth three (3) times daily.  pirbuterol (MAXAIR AUTOHALER) 200 mcg/Inhalation inhaler Take 2 Puffs by inhalation four (4) times daily as needed.  estradiol (VAGIFEM) 25 mcg vaginal tablet Insert 25 mcg into vagina two (2) days a week.  biotin 2,500 mcg Tab Take 5,000 mcg by mouth daily.          Allergies   Allergen Reactions    Latex Rash    Aldactone [Spironolactone] Hives    Chocolate [Cocoa] Rash    Cinnamon Rash    Hydrocodone Other (comments)     Vomiting, syncope    Lisinopril Hives       Objective:  Visit Vitals    /84 (BP 1 Location: Right arm, BP Patient Position: Sitting)    Pulse (!) 59    Temp 98.1 °F (36.7 °C) (Oral)    Resp 18    Ht 5' 2\" (1.575 m)    Wt 143 lb 12.8 oz (65.2 kg)    SpO2 98%    BMI 26.3 kg/m2     Physical Exam:   General appearance - alert, well appearing, and in no distress  Mental status - alert, oriented to person, place, and time  EYE-YUDELKA, EOMI, fundi normal, corneas normal, no foreign bodies  ENT-ENT exam normal, no neck nodes or sinus tenderness  Nose - normal and patent, no erythema, discharge or polyps  Mouth - mucous membranes moist, pharynx normal without lesions  Neck - supple, no significant adenopathy   Chest - clear to auscultation, no wheezes, rales or rhonchi, symmetric air entry   Heart - normal rate, regular rhythm, normal S1, S2, no murmurs, rubs, clicks or gallops   Abdomen - soft, nontender, nondistended, no masses or organomegaly  Lymph- no adenopathy palpable  Ext-peripheral pulses normal, no pedal edema, no clubbing or cyanosis  Skin-Warm and dry.  no hyperpigmentation, vitiligo, or suspicious lesions  Neuro -alert, oriented, normal speech, no focal findings or movement disorder noted  Musculoskeletal- FROM, no bony abnormalities, no point tenderness  Breast -deferred to GYN  Pelvic -deferred to GYN    Results for orders placed or performed in visit on 02/02/18   HEPATITIS C AB   Result Value Ref Range    Hep C Virus Ab <0.1 0.0 - 0.9 s/co ratio    Narrative    Performed at:  [address]  [id number]  : Meri Lang MD, Phone:  [phone]   [facility] POC COMPREHENSIVE METABOLIC PANEL   Result Value Ref Range    GLUCOSE  65 - 105 mg/dL    BUN  7 - 17 mg/dL    Creatinine (POC)  0.7 - 1.2 mg/dL    Sodium (POC)  137 - 145 MMOL/L    Potassium (POC)  3.6 - 5.0 MMOL/L    CHLORIDE  98 - 107 MMOL/L    CO2  22 - 32 MMOL/L    CALCIUM  8.4 - 10.2 mg/dL    TOTAL PROTEIN  6.3 - 8.2 g/dL    ALBUMIN  3.9 - 5.4 g/dL    AST (POC)  14 - 36 U/L    ALT (POC)  9 - 52 U/L    ALKALINE PHOS  38 - 126 U/L    TOTAL BILIRUBIN  0.2 - 1.3 mg/dL    eGFR (POC)     AMB POC URINALYSIS DIP STICK AUTO W/ MICRO    Result Value Ref Range    Color (UA POC)      Clarity (UA POC)      Glucose (UA POC)  Negative    Bilirubin (UA POC)  Negative    Ketones (UA POC)  Negative    Specific gravity (UA POC)  1.010 - 1.025    Blood (UA POC)  Negative    pH (UA POC)  5.0 - 7.0    Protein (UA POC)  Negative    Urobilinogen (UA POC)  0.2 - 1    Nitrites (UA POC)  Negative    Leukocyte esterase (UA POC)  Negative    Epithelial cells (UA POC)      Mucus (UA POC)      WBCs (UA POC)      RBCs (UA POC)      Casts (UA POC)  Negative    Crystals (UA POC)  Negative    Clue Cells (UA POC)      Trichomonas (UA POC)      Yeast (UA POC)      Bacteria (UA POC)  Negative    URINE CULT COMMENT (UA POC)       All results for lab orders may not have been returned by the time this encountered was closed. Assessment/Plan:    Orders Placed This Encounter    Depression Screen Annual    HEPATITIS C AB    AMB POC COMPREHENSIVE METABOLIC PANEL    AMB POC URINALYSIS DIP STICK AUTO W/ MICRO     Annual  Alcohol Screen 15 min ()    mesalamfabian LINDA (ASACOL HD) 800 mg DR tablet     Sig: two (2) times a day. Problem List Items Addressed This Visit     Bradycardia    Essential hypertension - Primary    Relevant Orders    AMB POC COMPREHENSIVE METABOLIC PANEL (Completed)    AMB POC URINALYSIS DIP STICK AUTO W/ MICRO  (Completed)      Other Visit Diagnoses     Medicare annual wellness visit, subsequent        Screening for alcoholism        Relevant Orders    IN ANNUAL ALCOHOL SCREEN 15 MIN    Screening for depression        Relevant Orders    DEPRESSION SCREEN ANNUAL    Screening for diabetes mellitus        Screening for ischemic heart disease        Need for hepatitis C screening test        Relevant Orders    HEPATITIS C AB (Completed)      Plan:    Medical problems as outlined above currently stable. Continue current medical regimen. Further recommendations based on lab results.     Patient Instructions       Medicare Wellness Visit, Female    The best way to live healthy is to have a healthy lifestyle by eating a well-balanced diet, exercising regularly, limiting alcohol and stopping smoking. Regular physical exams and screening tests are another way to keep healthy. Preventive exams provided by your health care provider can find health problems before they become diseases or illnesses. Preventive services including immunizations, screening tests, monitoring and exams can help you take care of your own health. All people over age 72 should have a pneumovax  and and a prevnar shot to prevent pneumonia. These are once in a lifetime unless you and your provider decide differently. All people over 65 should have a yearly flu shot and a tetanus vaccine every 10 years. A bone mass density to screen for osteoporosis or thinning of the bones should be done every 2 years after 65. Screening for diabetes mellitus with a blood sugar test should be done every year. Glaucoma is a disease of the eye due to increased ocular pressure that can lead to blindness and it should be done every year by an eye professional.    Cardiovascular screening tests that check for elevated lipids (fatty part of blood) which can lead to heart disease and strokes should be done every 5 years. Colorectal screening that evaluates for blood or polyps in your colon should be done yearly as a stool test or every five years as a flexible sigmoidoscope or every 10 years as a colonoscopy up to age 76. Breast cancer screening with a mammogram is recommended biennially  for women age 54-69. Screening for cervical cancer with a pap smear and pelvic exam is recommended for women after age 72 years every 2 years up to age 79 or when the provider and patient decide to stop. If there is a history of cervical abnormalities or other increased risk for cancer then the test is recommended yearly.     Hepatitis C screening is also recommended for anyone born between [birth year] through [address]. A shingles vaccine is also recommended once in a lifetime after age 61. Your Medicare Wellness Exam is recommended annually. Here is a list of your current Health Maintenance items with a due date:  Health Maintenance Due   Topic Date Due    Hepatitis C Test  1945    DTaP/Tdap/Td  (1 - Tdap) 05/28/1967    Stool testing for trace blood  05/28/1996    Shingles Vaccine  03/28/2006    Glaucoma Screening   05/28/2011    Pneumococcal Vaccine (1 of 2 - PCV13) 05/28/2011    Annual Well Visit  05/28/2011    Flu Vaccine  08/01/2017       All Medicare beneficiaries aged 48 and older are covered; however, when a beneficiary is at high risk, there is no minimum age required to receive a screening colonoscopy or a barium enema rendered as an alternative to a screening colonoscopy. The following are the coverage criteria for each colorectal cancer screening test/procedure. Screening FOBT  Medicare provides coverage of a screening FOBT annually (i.e., at least 11 months have passed following the month in which the last covered screening FOBT was performed) for beneficiaries aged 48 and older. This screening requires a written order from the beneficiarys attending physician. NOTE: Medicare will only provide coverage for one FOBT per year: either HCPCS code  or CPT code 62228, but not both. Screening Colonoscopy  For Beneficiaries at [facility] for Developing Colorectal Cancer  Medicare provides coverage of a screening colonoscopy (HCPCS code ) once every 2 years for beneficiaries at high risk for developing colorectal cancer (i.e., at least 23 months have passed following the month in which the last covered screening colonoscopy [HCPCS code ] was performed).     For Beneficiaries Not at [address] [place] [address] for Developing Colorectal Cancer  Medicare provides coverage of a screening colonoscopy (HCPCS code ) for beneficiaries who do not meet the criteria for being at high risk for developing colorectal cancer once every 10 years (i.e., at least 119 months have passed following the month in which the last covered screening colonoscopy [HCPCS code ] was performed). If the beneficiary otherwise qualifies to have a covered screening colonoscopy (HCPCS code ) based on the above but has had a covered screening flexible sigmoidoscopy (HCPCS code ), then Medicare may cover a screening colonoscopy (HCPCS code ) only after at least 47 months have passed following the month in which the last covered screening flexible sigmoidoscopy (HCPCS code ) was performed. Follow-up Disposition:  Return in about 6 months (around 8/2/2018) for follow up. I have reviewed with the patient details of the assessment and plan and all questions were answered. Relevent patient education was performed. The most recent lab findings were reviewed with the patient. An After Visit Summary was printed and given to the patient.       Shameka Hill MD

## 2018-02-02 NOTE — MR AVS SNAPSHOT
[address] [place] [address] 70 [address] [id number] [phone] Patient: Aury Shell MRN: [id number] IFH:[id number]/[id number] Visit Information Date & Time Provider Department Dept. Phone Encounter #  
 2/2/2018  9:40 AM MANDY Rubio Mai, MD [place] [id number] [phone] [id number] Upcoming Health Maintenance Date Due Hepatitis C Screening 1945 DTaP/Tdap/Td series (1 - Tdap) 5/28/1967 FOBT Q 1 YEAR AGE 50-75 5/28/1996 ZOSTER VACCINE AGE 60> 3/28/2006 GLAUCOMA SCREENING Q2Y 5/28/2011 Pneumococcal 65+ Low/Medium Risk (1 of 2 - PCV13) 5/28/2011 MEDICARE YEARLY EXAM 5/28/2011 Influenza Age 5 to Adult 8/1/2017 BREAST CANCER SCRN MAMMOGRAM 4/25/2019 Allergies as of 2/2/2018  Review Complete On: 2/2/2018 By: Jonathan Sutton MD  
  
 Severity Noted Reaction Type Reactions Latex  03/24/2016    Rash Aldactone [Spironolactone]  03/24/2016    Hives Chocolate [Cocoa]  03/24/2016    Rash Cinnamon  05/09/2016    Rash Hydrocodone  03/01/2012    Other (comments) Vomiting, syncope Lisinopril  03/24/2016    Hives Current Immunizations  Never Reviewed No immunizations on file. Not reviewed this visit You Were Diagnosed With   
  
 Codes Comments Essential hypertension    -  Primary ICD-10-CM: I10 
ICD-9-CM: 401.9 Bradycardia     ICD-10-CM: R00.1 ICD-9-CM: 427.89 Medicare annual wellness visit, subsequent     ICD-10-CM: Z00.00 ICD-9-CM: V70.0 Screening for alcoholism     ICD-10-CM: Z13.89 ICD-9-CM: V79.1 Screening for depression     ICD-10-CM: Z13.89 ICD-9-CM: V79.0 Screening for diabetes mellitus     ICD-10-CM: Z13.1 ICD-9-CM: V77.1 Screening for ischemic heart disease     ICD-10-CM: Z13.6 ICD-9-CM: V81.0 Need for hepatitis C screening test     ICD-10-CM: Z11.59 
ICD-9-CM: V73.89 Vitals BP Pulse Temp Resp Height(growth percentile) Weight(growth percentile) 162/84 (BP 1 Location: Right arm, BP Patient Position: Sitting) (!) 59 98.1 °F (36.7 °C) (Oral) 18 5' 2\" (1.575 m) 143 lb 12.8 oz (65.2 kg) SpO2 BMI OB Status Smoking Status 98% 26.3 kg/m2 Postmenopausal Never Smoker Vitals History BMI and BSA Data Body Mass Index Body Surface Area  
 26.3 kg/m 2 1.69 m 2 Preferred Pharmacy Pharmacy Name Phone [facility] [phone] Your Updated Medication List  
  
   
This list is accurate as of: 2/2/18 11:03 AM.  Always use your most recent med list.  
  
  
  
  
 Allergist Tray 1cc 27Gx1/2\" 1 mL 27 x 1/2\" Syrg Generic drug:  Syringe with Needle (Disp)  
every seven (7) days. amLODIPine 5 mg tablet Commonly known as:  Madonna Slate Take 1 Tab by mouth daily. * mesalamine  mg DR tablet Commonly known as:  ASACOL HD  
two (2) times a day. * ASACOL 400 mg EC tablet Generic drug:  mesalamine EC Take 800 mg by mouth three (3) times daily. biotin 2,500 mcg Tab Take 5,000 mcg by mouth daily. BYSTOLIC 10 mg tablet Generic drug:  nebivolol TAKE 1 TABLET DAILY CALCIUM 300 PO Take 600 mg by mouth daily. DULERA 100-5 mcg/actuation HFA inhaler Generic drug:  mometasone-formoterol Take 2 puffs by inhalation two (2) times a day. DUREZOL 0.05 % ophthalmic emulsion Generic drug:  Difluprednate 1 Drop by Does Not Apply route every Monday and Friday. * YUVAFEM 10 mcg Tab vaginal tablet Generic drug:  estradiol Insert 1 Tab into vagina every Monday and Friday. * estradiol 25 mcg vaginal tablet Commonly known as:  Eric Durán Insert 25 mcg into vagina two (2) days a week.  
  
 hyoscyamine SL 0.125 mg SL tablet Commonly known as:  LEVSIN/SL  
0.125 mg by SubLINGual route every four (4) hours. MAXAIR AUTOHALER 200 mcg/Inhalation inhaler Generic drug:  pirbuterol Take 2 Puffs by inhalation four (4) times daily as needed. Nebulizer Accessories Kit Use as directed  
  
 nystatin-triamcinolone topical cream  
Commonly known as:  MYCOLOG II  
as needed. olmesartan 40 mg tablet Commonly known as:  [employer] Take 1 Tab by mouth daily. Omeprazole delayed release 20 mg tablet Commonly known as:  PRILOSEC D/R Take 20 mg by mouth daily. PATANASE 0.6 % Spry Generic drug:  olopatadine 2 Squirts by Both Nostrils route two (2) times a day. PROAIR HFA 90 mcg/actuation inhaler Generic drug:  albuterol Take  by inhalation as needed for Wheezing. RESTASIS 0.05 % ophthalmic emulsion Generic drug:  cycloSPORINE Administer 1 Drop to both eyes two (2) times a day. valACYclovir 1 gram tablet Commonly known as:  VALTREX Take 1,000 mg by mouth daily. * Notice: This list has 4 medication(s) that are the same as other medications prescribed for you. Read the directions carefully, and ask your doctor or other care provider to review them with you. We Performed the Following AMB POC COMPREHENSIVE METABOLIC PANEL [82943 CPT(R)] AMB POC URINALYSIS DIP STICK AUTO W/ MICRO  [78196 CPT(R)] [address] [[id number] [facility]] HEPATITIS C AB [14495 CPT(R)] [place] ANNUAL ALCOHOL SCREEN 15 MIN [id number] [facility]] Patient Instructions Medicare Wellness Visit, Female The best way to live healthy is to have a healthy lifestyle by eating a well-balanced diet, exercising regularly, limiting alcohol and stopping smoking. Regular physical exams and screening tests are another way to keep healthy. Preventive exams provided by your health care provider can find health problems before they become diseases or illnesses. Preventive services including immunizations, screening tests, monitoring and exams can help you take care of your own health. All people over age 72 should have a pneumovax  and and a prevnar shot to prevent pneumonia. These are once in a lifetime unless you and your provider decide differently. All people over 65 should have a yearly flu shot and a tetanus vaccine every 10 years. A bone mass density to screen for osteoporosis or thinning of the bones should be done every 2 years after 65. Screening for diabetes mellitus with a blood sugar test should be done every year. Glaucoma is a disease of the eye due to increased ocular pressure that can lead to blindness and it should be done every year by an eye professional. 
 
Cardiovascular screening tests that check for elevated lipids (fatty part of blood) which can lead to heart disease and strokes should be done every 5 years. Colorectal screening that evaluates for blood or polyps in your colon should be done yearly as a stool test or every five years as a flexible sigmoidoscope or every 10 years as a colonoscopy up to age 76. Breast cancer screening with a mammogram is recommended biennially  for women age 54-69. Screening for cervical cancer with a pap smear and pelvic exam is recommended for women after age 72 years every 2 years up to age 79 or when the provider and patient decide to stop. If there is a history of cervical abnormalities or other increased risk for cancer then the test is recommended yearly. Hepatitis C screening is also recommended for anyone born between 80 through [address]. A shingles vaccine is also recommended once in a lifetime after age 61. Your Medicare Wellness Exam is recommended annually. Here is a list of your current Health Maintenance items with a due date: 
Health Maintenance Due Topic Date Due  
Charles Leigh Test  1945  
 DTaP/Tdap/Td  (1 - Tdap) 05/28/1967  Stool testing for trace blood  05/28/1996  Shingles Vaccine  03/28/2006  Glaucoma Screening   05/28/2011  Pneumococcal Vaccine (1 of 2 - PCV13) 05/28/2011 Rosalie Orellana Annual Well Visit  05/28/2011  Flu Vaccine  08/01/2017 All Medicare beneficiaries aged 48 and older are covered; however, when a beneficiary is at high risk, there is no minimum age required to receive a screening colonoscopy or a barium enema rendered as an alternative to a screening colonoscopy. The following are the coverage criteria for each colorectal cancer screening test/procedure. Screening FOBT Medicare provides coverage of a screening FOBT annually (i.e., at least 11 months have passed following the month in which the last covered screening FOBT was performed) for beneficiaries aged 48 and older. This screening requires a written order from the beneficiarys attending physician. NOTE: Medicare will only provide coverage for one FOBT per year: either HCPCS code  or CPT code 03407, but not both. Screening Colonoscopy For Beneficiaries at [facility] for Developing Colorectal Cancer Medicare provides coverage of a screening colonoscopy (HCPCS code ) once every 2 years for beneficiaries at high risk for developing colorectal cancer (i.e., at least 23 months have passed following the month in which the last covered screening colonoscopy [HCPCS code ] was performed). For Beneficiaries Not at [address] [place] [address] for Developing Colorectal Cancer Medicare provides coverage of a screening colonoscopy (HCPCS code ) for beneficiaries who do not meet the criteria for being at high risk for developing colorectal cancer once every 10 years (i.e., at least 119 months have passed following the month in which the last covered screening colonoscopy [HCPCS code ] was performed).  If the beneficiary otherwise qualifies to have a covered screening colonoscopy (HCPCS code ) based on the above but has had a covered screening flexible sigmoidoscopy (HCPCS code ), then Medicare may cover a screening colonoscopy ([facility] code ) only after at least 47 months have passed following the month in which the last covered screening flexible sigmoidoscopy (HCPCS code ) was performed. Introducing [facility] SERVICES! Christopherbalbina Chase introduces [employer] patient portal. Now you can access parts of your medical record, email your doctor's office, and request medication refills online. 1. In your internet browser, go to [email or web address] 2. Click on the First Time User? Click Here link in the Sign In box. You will see the New Member Sign Up page. 3. Enter your [employer] Access Code exactly as it appears below. You will not need to use this code after youve completed the sign-up process. If you do not sign up before the expiration date, you must request a new code. · [employer] Access Code: [email or web address] Expires: 5/3/2018  9:30 AM 
 
4. Enter the last four digits of your Social Security Number (xxxx) and Date of Birth (mm/dd/yyyy) as indicated and click Submit. You will be taken to the next sign-up page. 5. Create a [employer] ID. This will be your [employer] login ID and cannot be changed, so think of one that is secure and easy to remember. 6. Create a [employer] password. You can change your password at any time. 7. Enter your Password Reset Question and Answer. This can be used at a later time if you forget your password. 8. Enter your e-mail address. You will receive e-mail notification when new information is available in [address] 9. Click Sign Up. You can now view and download portions of your medical record. 10. Click the Download Summary menu link to download a portable copy of your medical information. If you have questions, please visit the Frequently Asked Questions section of the [employer] website. Remember, [employer] is NOT to be used for urgent needs. For medical emergencies, dial 911. Now available from your iPhone and Android! Please provide this summary of care documentation to your next provider. Your primary care clinician is listed as MANDY Syed. If you have any questions after today's visit, please call [phone].

## 2018-02-02 NOTE — PROGRESS NOTES
Chief Complaint   Patient presents with    Hypertension     6 month follow up; Room 8     1. Have you been to the ER, urgent care clinic since your last visit? Hospitalized since your last visit? No    2. Have you seen or consulted any other health care providers outside of the [address] [place] [address] since your last visit? Include any pap smears or colon screening.  No

## 2018-02-03 LAB — HCV AB S/CO SERPL IA: <0.1 S/CO RATIO (ref 0–0.9)

## 2018-02-13 ENCOUNTER — TELEPHONE (OUTPATIENT)
Dept: INTERNAL MEDICINE CLINIC | Age: 73
End: 2018-02-13

## 2018-02-13 NOTE — TELEPHONE ENCOUNTER
Patient called stating her BP has been elevated. Yesterday morning at 11:00am it was 205/80sometiming. Today at:  5:52am    194/86  (Took meds)  7:00am    197/60  8:50am    180/75  11:10am  189/80    Should her meds be increased?

## 2018-04-19 RX ORDER — AMLODIPINE BESYLATE 5 MG/1
5 TABLET ORAL DAILY
Qty: 90 TAB | Refills: 3 | Status: SHIPPED | OUTPATIENT
Start: 2018-04-19 | End: 2019-04-07 | Stop reason: SDUPTHER

## 2018-04-19 RX ORDER — OLMESARTAN MEDOXOMIL 40 MG/1
40 TABLET ORAL DAILY
Qty: 90 TAB | Refills: 3 | Status: SHIPPED | OUTPATIENT
Start: 2018-04-19 | End: 2019-04-07 | Stop reason: SDUPTHER

## 2018-04-19 NOTE — TELEPHONE ENCOUNTER
Requested Prescriptions     Pending Prescriptions Disp Refills    amLODIPine (NORVASC) 5 mg tablet 90 Tab 1     Sig: Take 1 Tab by mouth daily.  olmesartan (BENICAR) 40 mg tablet 90 Tab 1     Sig: Take 1 Tab by mouth daily.        Last Refill: 1/24/18  Next Appointment:8/21/18

## 2018-04-27 ENCOUNTER — HOSPITAL ENCOUNTER (OUTPATIENT)
Dept: MAMMOGRAPHY | Age: 73
Discharge: HOME | End: 2018-04-27
Attending: OBSTETRICS & GYNECOLOGY
Payer: COMMERCIAL

## 2018-04-27 DIAGNOSIS — Z12.31 VISIT FOR SCREENING MAMMOGRAM: ICD-10-CM

## 2018-04-27 PROCEDURE — 77067 SCR MAMMO BI INCL CAD: CPT

## 2018-08-21 ENCOUNTER — OFFICE VISIT (OUTPATIENT)
Dept: INTERNAL MEDICINE CLINIC | Age: 73
End: 2018-08-21

## 2018-08-21 VITALS
HEART RATE: 53 BPM | DIASTOLIC BLOOD PRESSURE: 80 MMHG | RESPIRATION RATE: 16 BRPM | OXYGEN SATURATION: 98 % | WEIGHT: 142.8 LBS | HEIGHT: 62 IN | TEMPERATURE: 98.7 F | BODY MASS INDEX: 26.28 KG/M2 | SYSTOLIC BLOOD PRESSURE: 172 MMHG

## 2018-08-21 DIAGNOSIS — J45.20 MILD INTERMITTENT ASTHMA WITHOUT COMPLICATION: ICD-10-CM

## 2018-08-21 DIAGNOSIS — E78.2 MIXED HYPERLIPIDEMIA: ICD-10-CM

## 2018-08-21 DIAGNOSIS — Z23 ENCOUNTER FOR IMMUNIZATION: ICD-10-CM

## 2018-08-21 DIAGNOSIS — I10 ESSENTIAL HYPERTENSION: Primary | ICD-10-CM

## 2018-08-21 DIAGNOSIS — M81.0 AGE-RELATED OSTEOPOROSIS WITHOUT CURRENT PATHOLOGICAL FRACTURE: ICD-10-CM

## 2018-08-21 DIAGNOSIS — S83.411A SPRAIN OF MEDIAL COLLATERAL LIGAMENT OF RIGHT KNEE, INITIAL ENCOUNTER: ICD-10-CM

## 2018-08-21 PROBLEM — B37.31 VAGINAL YEAST INFECTION: Status: RESOLVED | Noted: 2017-06-28 | Resolved: 2018-08-21

## 2018-08-21 PROBLEM — S00.03XA TRAUMATIC HEMATOMA OF SCALP: Status: RESOLVED | Noted: 2017-06-28 | Resolved: 2018-08-21

## 2018-08-21 PROBLEM — R53.83 FATIGUE: Status: RESOLVED | Noted: 2017-06-28 | Resolved: 2018-08-21

## 2018-08-21 PROBLEM — R10.9 FLANK PAIN: Status: RESOLVED | Noted: 2017-06-28 | Resolved: 2018-08-21

## 2018-08-21 PROBLEM — M54.2 NECK PAIN: Status: RESOLVED | Noted: 2017-06-28 | Resolved: 2018-08-21

## 2018-08-21 PROBLEM — L65.9 HAIR LOSS: Status: RESOLVED | Noted: 2017-06-28 | Resolved: 2018-08-21

## 2018-08-21 PROBLEM — H66.90 OTITIS MEDIA, ACUTE: Status: RESOLVED | Noted: 2017-06-28 | Resolved: 2018-08-21

## 2018-08-21 PROBLEM — E87.6 HYPOKALEMIA: Status: RESOLVED | Noted: 2017-06-28 | Resolved: 2018-08-21

## 2018-08-21 LAB
BACTERIA UA POCT, BACTPOCT: NORMAL
BILIRUB UR QL STRIP: NEGATIVE
CASTS UA POCT: 0
CLUE CELLS, CLUEPOCT: NEGATIVE
CRYSTALS UA POCT, CRYSPOCT: NEGATIVE
EPITHELIAL CELLS POCT: NORMAL
GLUCOSE UR-MCNC: NEGATIVE MG/DL
KETONES P FAST UR STRIP-MCNC: NEGATIVE MG/DL
MUCUS UA POCT, MUCPOCT: NORMAL
PH UR STRIP: 7 [PH] (ref 5–7)
PROT UR QL STRIP: NEGATIVE
RBC UA POCT, RBCPOCT: NORMAL
SP GR UR STRIP: 1.01 (ref 1.01–1.02)
TRICH UA POCT, TRICHPOC: NEGATIVE
UA UROBILINOGEN AMB POC: NORMAL (ref 0.2–1)
URINALYSIS CLARITY POC: CLEAR
URINALYSIS COLOR POC: NORMAL
URINE BLOOD POC: NEGATIVE
URINE CULT COMMENT, POCT: NORMAL
URINE LEUKOCYTES POC: NEGATIVE
URINE NITRITES POC: NEGATIVE
WBC UA POCT, WBCPOCT: 0
YEAST UA POCT, YEASTPOC: NEGATIVE

## 2018-08-21 NOTE — PATIENT INSTRUCTIONS
Body Mass Index: Care Instructions  Your Care Instructions    Body mass index (BMI) can help you see if your weight is raising your risk for health problems. It uses a formula to compare how much you weigh with how tall you are. · A BMI lower than 18.5 is considered underweight. · A BMI between 18.5 and 24.9 is considered healthy. · A BMI between 25 and 29.9 is considered overweight. A BMI of 30 or higher is considered obese. If your BMI is in the normal range, it means that you have a lower risk for weight-related health problems. If your BMI is in the overweight or obese range, you may be at increased risk for weight-related health problems, such as high blood pressure, heart disease, stroke, arthritis or joint pain, and diabetes. If your BMI is in the underweight range, you may be at increased risk for health problems such as fatigue, lower protection (immunity) against illness, muscle loss, bone loss, hair loss, and hormone problems. BMI is just one measure of your risk for weight-related health problems. You may be at higher risk for health problems if you are not active, you eat an unhealthy diet, or you drink too much alcohol or use tobacco products. Follow-up care is a key part of your treatment and safety. Be sure to make and go to all appointments, and call your doctor if you are having problems. It's also a good idea to know your test results and keep a list of the medicines you take. How can you care for yourself at home? · Practice healthy eating habits. This includes eating plenty of fruits, vegetables, whole grains, lean protein, and low-fat dairy. · If your doctor recommends it, get more exercise. Walking is a good choice. Bit by bit, increase the amount you walk every day. Try for at least 30 minutes on most days of the week. · Do not smoke. Smoking can increase your risk for health problems. If you need help quitting, talk to your doctor about stop-smoking programs and medicines. These can increase your chances of quitting for good. · Limit alcohol to 2 drinks a day for men and 1 drink a day for women. Too much alcohol can cause health problems. If you have a BMI higher than 25  · Your doctor may do other tests to check your risk for weight-related health problems. This may include measuring the distance around your waist. A waist measurement of more than 40 inches in men or 35 inches in women can increase the risk of weight-related health problems. · Talk with your doctor about steps you can take to stay healthy or improve your health. You may need to make lifestyle changes to lose weight and stay healthy, such as changing your diet and getting regular exercise. If you have a BMI lower than 18.5  · Your doctor may do other tests to check your risk for health problems. · Talk with your doctor about steps you can take to stay healthy or improve your health. You may need to make lifestyle changes to gain or maintain weight and stay healthy, such as getting more healthy foods in your diet and doing exercises to build muscle. Where can you learn more? Go to http://helio-patricia.info/. Enter S176 in the search box to learn more about \"Body Mass Index: Care Instructions. \"  Current as of: October 13, 2016  Content Version: 11.4  © 6829-1347 [employer], [employer]. Care instructions adapted under license by [employer] (which disclaims liability or warranty for this information). If you have questions about a medical condition or this instruction, always ask your healthcare professional. [address] any warranty or liability for your use of this information.

## 2018-08-21 NOTE — PROGRESS NOTES
This note will not be viewable in [address] Kristy Rehman is a 68 y.o. female and presents with Asthma (room 1) and Hypertension  . Subjective:  Mrs. Ty Silva presents today for follow-up of hypertension, bradycardia, asthma, history of ulcerative colitis and new complaint of right knee pain or discomfort which is been present for 2 weeks. She states that while walking down a flight of stairs that she twisted her knee and felt a pop or sudden sensation of pain in her right knee. The pain is located along the medial side of her knee and was initially associated with some swelling. She has been using some Aspercreme and notes that the pain is worse with activity walking up or down a flight of stairs or with walking. She states the pain is mildly improved 2 weeks from the initial event but is still bothersome. She denies any shortness of breath, chest pain, palpitations, PND, orthopnea, or pedal edema. She has not had any exacerbations of her asthma. Her ulcerative colitis is controlled on her current medical regimen. She does have GI follow-up for this regularly.     Past Medical History:   Diagnosis Date    Allergic rhinitis 6/28/2017    Arthritis     Asthma     Asthma 6/28/2017    Impressio: continue nebs and Dulera, prednisone taper    Bradycardia 6/28/2017    Impression: asymptomatic, continue Bystolic    Cataract, bilateral 6/28/2017    Impression: low risk for elective surgical procedure, proceed without further risk stratification, EKG shows sinus domingo without acute ST-T changes which is her baseline    Crohn's disease ([address]     Crohn's disease ([facility] [address] 6/28/2017    Impression: GI following    Degenerative arthritis 6/28/2017    Impression: trial of topical NSAID, if persists can consider an injection    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, 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    Fatigue 6/28/2017    Flank pain 6/28/2017    Impression: most likely secondary to Crohn's, not cardiac by history, EKG normal Status is Inactive      Gastrointestinal disorder     crones    Hair loss 6/28/2017    Impression: refer to derm    Hematochezia 6/28/2017    Status is Inactive    Herpes Dx 2015    no outbreak as of 5/2/16    Hyperlipidemia 6/28/2017    Hypertension     Hypokalemia 6/28/2017    MRSA (methicillin resistant staph aureus) culture positive \"many years ago\" as of 5/2/16    pt states + nasal swab \"many years ago\", Tx and no + since; UNCONFIRMED    Neck pain 6/28/2017    Impresssion: finish PT, continue HEP, pain med prn    Osteoporosis 6/28/2017    Otitis media, acute 6/28/2017    Impression: follow up next week, finish abx Status is Inactive    Pneumonia 6/28/2017    Impression:  Completed course of antibiotics, clinically resolved Status is Inactive    Traumatic hematoma of scalp 6/28/2017    Status is Inactive    Ulcerative colitis ([facility] [address] 6/28/2017    Vaginal yeast infection 6/28/2017    Impression, consider probiotic, check blood sugar, follow up GYN Status is Inactive     Past Surgical History:   Procedure Laterality Date    HX CATARACT REMOVAL Right 3/28/16    HX MOHS PROCEDURES Right     HX ORTHOPAEDIC Left     left knee sx    HX ORTHOPAEDIC Right     trigger release      HX TUBAL LIGATION       Allergies   Allergen Reactions    Latex Rash    Aldactone [Spironolactone] Hives    Chocolate [Cocoa] Rash    Cinnamon Rash    Hydrocodone Other (comments)     Vomiting, syncope    Lisinopril Hives     Current Outpatient Prescriptions   Medication Sig Dispense Refill    amLODIPine (NORVASC) 5 mg tablet Take 1 Tab by mouth daily. 90 Tab 3    olmesartan (BENICAR) 40 mg tablet Take 1 Tab by mouth daily. 90 Tab 3    BYSTOLIC 10 mg tablet TAKE 1 TABLET DAILY 90 Tab 3    YUVAFEM 10 mcg tab vaginal tablet Insert 1 Tab into vagina every Monday and Friday.  nystatin-triamcinolone (MYCOLOG II) topical cream as needed.       ALLERGIST TRAY 1CC 27GX1/2\" 1 mL 27 x 1/2\" syrg every seven (7) days. 99    Nebulizer Accessories kit Use as directed 1 Kit 3    cycloSPORINE (RESTASIS) 0.05 % ophthalmic emulsion Administer 1 Drop to both eyes two (2) times a day.  albuterol (PROAIR HFA) 90 mcg/actuation inhaler Take  by inhalation as needed for Wheezing.  valACYclovir (VALTREX) 1 gram tablet Take 1,000 mg by mouth daily.  mometasone-formoterol (DULERA) 100-5 mcg/actuation HFA inhaler Take 2 puffs by inhalation two (2) times a day.  mesalamine EC (ASACOL) 400 mg EC tablet Take 800 mg by mouth three (3) times daily.  hyoscyamine SL (LEVSIN/SL) 0.125 mg SL tablet 0.125 mg by SubLINGual route every four (4) hours.  CALCIUM CARBONATE (CALCIUM 300 PO) Take 600 mg by mouth daily.  pirbuterol (MAXAIR AUTOHALER) 200 mcg/Inhalation inhaler Take 2 Puffs by inhalation four (4) times daily as needed.  Omeprazole delayed release (PRILOSEC D/R) 20 mg tablet Take 40 mg by mouth daily.  estradiol (VAGIFEM) 25 mcg vaginal tablet Insert 25 mcg into vagina two (2) days a week.  olopatadine (PATANASE) 0.6 % Spry 2 Squirts by Both Nostrils route two (2) times a day.  biotin 2,500 mcg Tab Take 5,000 mcg by mouth daily.  mesalamine DR (ASACOL HD) 800 mg DR tablet two (2) times a day.  DUREZOL 0.05 % ophthalmic emulsion 1 Drop by Does Not Apply route every Monday and Friday. Social History     Social History    Marital status:      Spouse name: N/A    Number of children: N/A    Years of education: N/A     Social History Main Topics    Smoking status: Never Smoker    Smokeless tobacco: Never Used    Alcohol use No    Drug use: No    Sexual activity: No     Other Topics Concern    None     Social History Narrative     Family History   Problem Relation Age of Onset    Breast Cancer Maternal Aunt      ?     Heart Disease Mother     Asthma Father     Stroke Father        Review of Systems  Constitutional:  negative for fevers, chills, anorexia and weight loss  Eyes:    negative for visual disturbance and irritation  ENT:    negative for tinnitus,sore throat,nasal congestion,ear pains. hoarseness  Respiratory:     negative for cough, hemoptysis, dyspnea,wheezing  CV:    negative for chest pain, palpitations, lower extremity edema  GI:    negative for nausea, vomiting, diarrhea, abdominal pain,melena  Endo:               negative for polyuria,polydipsia,polyphagia,heat intolerance  Genitourinary : negative for frequency, dysuria and hematuria  Integumentary: negative for rash and pruritus  Hematologic:   negative for easy bruising and gum/nose bleeding  Musculoskel:  negative for myalgias,  back pain, muscle weakness  Neurological:   negative for headaches, dizziness, vertigo, memory problems and gait   Behavl/Psych:  negative for feelings of anxiety, depression, mood changes  ROS otherwise negative      Objective:  Visit Vitals    /80 (BP 1 Location: Left arm, BP Patient Position: Sitting)    Pulse (!) 53    Temp 98.7 °F (37.1 °C) (Oral)    Resp 16    Ht 5' 2\" (1.575 m)    Wt 142 lb 12.8 oz (64.8 kg)    SpO2 98%    BMI 26.12 kg/m2     Physical Exam:   General appearance - alert, well appearing, and in no distress  Mental status - alert, oriented to person, place, and time  EYE-YUDELKA, EOMI, fundi normal, corneas normal, no foreign bodies  ENT-ENT exam normal, no neck nodes or sinus tenderness  Nose - normal and patent, no erythema, discharge or polyps  Mouth - mucous membranes moist, pharynx normal without lesions  Neck - supple, no significant adenopathy   Chest - clear to auscultation, no wheezes, rales or rhonchi, symmetric air entry   Heart - normal rate, regular rhythm, normal S1, S2, no murmurs, rubs, clicks or gallops   Abdomen - soft, nontender, nondistended, no masses or organomegaly  Lymph- no adenopathy palpable  Ext-peripheral pulses normal, no pedal edema, no clubbing or cyanosis  Skin-Warm and dry.  no hyperpigmentation, vitiligo, or suspicious lesions  Neuro -alert, oriented, normal speech, no focal findings or movement disorder noted  Musculoskeletal-right knee demonstrates negative anterior drawer sign, minimal effusion, no calor or erythema, Lachman's maneuver is negative, there is pain with varus stress, no pain with valgus stress, minimal tenderness along the medial collateral ligament. Assessment/Plan:  Diagnoses and all orders for this visit:    1. Essential hypertension  -     AMB POC URINALYSIS DIP STICK AUTO W/ MICRO   -     PNEUMOCOCCAL CONJ VACCINE 13 VALENT IM (Age 48 and over)  -     METABOLIC PANEL, COMPREHENSIVE  -     LIPID PANEL    2. Mixed hyperlipidemia  -     AMB POC URINALYSIS DIP STICK AUTO W/ MICRO   -     PNEUMOCOCCAL CONJ VACCINE 13 VALENT IM (Age 48 and over)  -     METABOLIC PANEL, COMPREHENSIVE  -     LIPID PANEL    3. Age-related osteoporosis without current pathological fracture  -     AMB POC URINALYSIS DIP STICK AUTO W/ MICRO   -     PNEUMOCOCCAL CONJ VACCINE 13 VALENT IM (Age 48 and over)  -     METABOLIC PANEL, COMPREHENSIVE  -     LIPID PANEL    4. Mild intermittent asthma without complication  -     AMB POC URINALYSIS DIP STICK AUTO W/ MICRO   -     PNEUMOCOCCAL CONJ VACCINE 13 VALENT IM (Age 48 and over)  -     METABOLIC PANEL, COMPREHENSIVE  -     LIPID PANEL    5. Sprain of medial collateral ligament of right knee, initial encounter  -     AMB POC URINALYSIS DIP STICK AUTO W/ MICRO   -     PNEUMOCOCCAL CONJ VACCINE 13 VALENT IM (Age 48 and over)  -     METABOLIC PANEL, COMPREHENSIVE  -     LIPID PANEL    6. Encounter for immunization  -     AMB POC URINALYSIS DIP STICK AUTO W/ MICRO   -     PNEUMOCOCCAL CONJ VACCINE 13 VALENT IM (Age 48 and over)  -     METABOLIC PANEL, COMPREHENSIVE  -     LIPID PANEL          ICD-10-CM ICD-9-CM    1.  Essential hypertension I10 401.9 AMB POC URINALYSIS DIP STICK AUTO W/ MICRO       PNEUMOCOCCAL CONJ VACCINE 13 VALENT IM      METABOLIC PANEL, COMPREHENSIVE      LIPID PANEL      CANCELED: AMB POC COMPREHENSIVE METABOLIC PANEL   2. Mixed hyperlipidemia E78.2 272.2 AMB POC URINALYSIS DIP STICK AUTO W/ MICRO       PNEUMOCOCCAL CONJ VACCINE 13 VALENT IM      METABOLIC PANEL, COMPREHENSIVE      LIPID PANEL      CANCELED: AMB POC LIPID PROFILE   3. Age-related osteoporosis without current pathological fracture M81.0 733.01 AMB POC URINALYSIS DIP STICK AUTO W/ MICRO       PNEUMOCOCCAL CONJ VACCINE 13 VALENT IM      METABOLIC PANEL, COMPREHENSIVE      LIPID PANEL   4. Mild intermittent asthma without complication L21.73 982.55 AMB POC URINALYSIS DIP STICK AUTO W/ MICRO       PNEUMOCOCCAL CONJ VACCINE 13 VALENT IM      METABOLIC PANEL, COMPREHENSIVE      LIPID PANEL   5. Sprain of medial collateral ligament of right knee, initial encounter S83.411A 844.1 AMB POC URINALYSIS DIP STICK AUTO W/ MICRO       PNEUMOCOCCAL CONJ VACCINE 13 VALENT IM      METABOLIC PANEL, COMPREHENSIVE      LIPID PANEL   6. Encounter for immunization Z23 V03.89 AMB POC URINALYSIS DIP STICK AUTO W/ MICRO       PNEUMOCOCCAL CONJ VACCINE 13 VALENT IM      METABOLIC PANEL, COMPREHENSIVE      LIPID PANEL     Plan:    Probable medial collateral ligament sprain of the right knee. Continue conservative management with topical nonsteroidal as needed. If not improved over the next 8-12 weeks consider [facility] referral for same. Blood pressure was moderately elevated today. Her blood pressure at home is significantly better than in the office. This is consistent with her previous history. She will continue her current medical regimen and follow-up as planned in 6 months. Further recommendations based lab results. Body mass index just above normal range. Lifestyle changes including monitoring portion sizes and exercise once her knee has improved. She will be given a Prevnar 13 vaccine today. Plan update Pneumovax in 1 year followed by booster in 5 years.     Follow-up Disposition:  Return in about 6 months (around 2/21/2019). I have reviewed with the patient details of the assessment and plan and all questions were answered. Relevent patient education was performed. Verbal and/or written instructions (see AVS) provided. The most recent lab findings were reviewed with the patient. Plan was discussed with patient who verbally expressed understanding. An After Visit Summary was printed and given to the patient. Heriberto Sanchez MD        Discussed the patient's BMI with her. The BMI follow up plan is as follows:     dietary management education, guidance, and counseling  encourage exercise  monitor weight  prescribed dietary intake    An After Visit Summary was printed and given to the patient.

## 2018-08-21 NOTE — PROGRESS NOTES
Chief Complaint   Patient presents with    Asthma     room 1    Hypertension       1. Have you been to the ER, urgent care clinic since your last visit? NO Hospitalized since your last visit? NO    2. Have you seen or consulted any other health care providers outside of the [facility] since your last visit? Include any pap smears or colon screening. YES, DR. Merlin Spine (GI). DR. Hardy Quintana (EYE DOC),  DR. Sharon De Jesus (ORTHO SURGEON FOR [facility])      PT IS HERE FOR ROUTINE CHECK. PT IS FASTING. Santy Restrepo is a 68 y.o. female who presents for routine immunizations. She denies any symptoms , reactions or allergies that would exclude them from being immunized today. Risks and adverse reactions were discussed and the VIS was given to them. All questions were addressed. She was observed for 15 min post injection. There were no reactions observed.     Kristina Issa LPN

## 2018-08-21 NOTE — MR AVS SNAPSHOT
[address] [address] [phone] Patient: Dulce Kapoor MRN: [id number] [id number] Visit Information Date & Time Provider Department Dept. Phone Encounter #  
 8/21/2018  8:30 AM MANDY Song MD [address] [place] [address] [facility] [phone] [id number] Follow-up Instructions Return in about 6 months (around 2/21/2019). Your Appointments 2/22/2019  8:30 AM  
Follow Up with MANDY Song MD  
[facility] ([address]) Appt Note: 6 month follow up Essie [address] [phone] [address] [address] Upcoming Health Maintenance Date Due DTaP/Tdap/Td series (1 - Tdap) 5/28/1967 FOBT Q 1 YEAR AGE 50-75 5/28/1996 ZOSTER VACCINE AGE 60> 3/28/2006 GLAUCOMA SCREENING Q2Y 5/28/2011 Bone Densitometry (Dexa) Screening 5/28/2011 Pneumococcal 65+ Low/Medium Risk (1 of 2 - PCV13) 5/28/2011 Influenza Age 5 to Adult 8/1/2018 MEDICARE YEARLY EXAM 2/3/2019 BREAST CANCER SCRN MAMMOGRAM 4/27/2020 Allergies as of 8/21/2018  Review Complete On: 8/21/2018 By: Joanna Hahn MD  
  
 Severity Noted Reaction Type Reactions Latex  03/24/2016    Rash Aldactone [Spironolactone]  03/24/2016    Hives Chocolate [Cocoa]  03/24/2016    Rash Cinnamon  05/09/2016    Rash Hydrocodone  03/01/2012    Other (comments) Vomiting, syncope Lisinopril  03/24/2016    Hives Current Immunizations  Never Reviewed Name Date Pneumococcal Conjugate (PCV-13) 8/21/2018 Not reviewed this visit You Were Diagnosed With   
  
 Codes Comments Essential hypertension    -  Primary ICD-10-CM: I10 
ICD-9-CM: 401.9 Mixed hyperlipidemia     ICD-10-CM: E78.2 ICD-9-CM: 272.2 Age-related osteoporosis without current pathological fracture     ICD-10-CM: M81.0 ICD-9-CM: 733.01 Mild intermittent asthma without complication     ZULEYKA-99-AV: J45.20 ICD-9-CM: 493.90 Sprain of medial collateral ligament of right knee, initial encounter     ICD-10-CM: N35.989U ICD-9-CM: 844.1 Encounter for immunization     ICD-10-CM: V57 ICD-9-CM: V03.89 Vitals BP Pulse Temp Resp Height(growth percentile) Weight(growth percentile) 172/80 (BP 1 Location: Left arm, BP Patient Position: Sitting) (!) 53 98.7 °F (37.1 °C) (Oral) 16 5' 2\" (1.575 m) 142 lb 12.8 oz (64.8 kg) SpO2 BMI OB Status Smoking Status 98% 26.12 kg/m2 Postmenopausal Never Smoker Vitals History BMI and BSA Data Body Mass Index Body Surface Area  
 26.12 kg/m 2 1.68 m 2 Preferred Pharmacy Pharmacy Name Phone [id number] [address] [phone] Your Updated Medication List  
  
   
This list is accurate as of 8/21/18  9:13 AM.  Always use your most recent med list.  
  
  
  
  
 Allergist Tray 1cc 27Gx1/2\" 1 mL 27 x 1/2\" Syrg Generic drug:  Syringe with Needle (Disp)  
every seven (7) days. amLODIPine 5 mg tablet Commonly known as:  Salguero Fanti Take 1 Tab by mouth daily. * mesalamine  mg DR tablet Commonly known as:  ASACOL HD  
two (2) times a day. * ASACOL 400 mg EC tablet Generic drug:  mesalamine EC Take 800 mg by mouth three (3) times daily. biotin 2,500 mcg Tab Take 5,000 mcg by mouth daily. BYSTOLIC 10 mg tablet Generic drug:  nebivolol TAKE 1 TABLET DAILY CALCIUM 300 PO Take 600 mg by mouth daily. DULERA 100-5 mcg/actuation HFA inhaler Generic drug:  mometasone-formoterol Take 2 puffs by inhalation two (2) times a day. DUREZOL 0.05 % ophthalmic emulsion Generic drug:  Difluprednate 1 Drop by Does Not Apply route every Monday and Friday. * YUVAFEM 10 mcg Tab vaginal tablet Generic drug:  estradiol Insert 1 Tab into vagina every Monday and Friday. * estradiol 25 mcg vaginal tablet Commonly known as:  Skye Jennifer Insert 25 mcg into vagina two (2) days a week.  
  
 hyoscyamine SL 0.125 mg SL tablet Commonly known as:  LEVSIN/SL  
0.125 mg by SubLINGual route every four (4) hours. MAXAIR AUTOHALER 200 mcg/Inhalation inhaler Generic drug:  pirbuterol Take 2 Puffs by inhalation four (4) times daily as needed. Nebulizer Accessories Kit Use as directed  
  
 nystatin-triamcinolone topical cream  
Commonly known as:  MYCOLOG II  
as needed. olmesartan 40 mg tablet Commonly known as:  [employer] Take 1 Tab by mouth daily. Omeprazole delayed release 20 mg tablet Commonly known as:  PRILOSEC D/R Take 40 mg by mouth daily. PATANASE 0.6 % Spry Generic drug:  olopatadine 2 Squirts by Both Nostrils route two (2) times a day. PROAIR HFA 90 mcg/actuation inhaler Generic drug:  albuterol Take  by inhalation as needed for Wheezing. RESTASIS 0.05 % ophthalmic emulsion Generic drug:  cycloSPORINE Administer 1 Drop to both eyes two (2) times a day. valACYclovir 1 gram tablet Commonly known as:  VALTREX Take 1,000 mg by mouth daily. * Notice: This list has 4 medication(s) that are the same as other medications prescribed for you. Read the directions carefully, and ask your doctor or other care provider to review them with you. We Performed the Following AMB POC URINALYSIS DIP STICK AUTO W/ MICRO  [12352 CPT(R)] LIPID PANEL [34518 CPT(R)] METABOLIC PANEL, COMPREHENSIVE [06593 CPT(R)] PNEUMOCOCCAL CONJ VACCINE 13 VALENT IM [id number] CPT(R)] Follow-up Instructions Return in about 6 months (around 2/21/2019). Patient Instructions Body Mass Index: Care Instructions Your Care Instructions Body mass index (BMI) can help you see if your weight is raising your risk for health problems. It uses a formula to compare how much you weigh with how tall you are. · A BMI lower than 18.5 is considered underweight. · A BMI between 18.5 and 24.9 is considered healthy. · A BMI between 25 and 29.9 is considered overweight. A BMI of 30 or higher is considered obese. If your BMI is in the normal range, it means that you have a lower risk for weight-related health problems. If your BMI is in the overweight or obese range, you may be at increased risk for weight-related health problems, such as high blood pressure, heart disease, stroke, arthritis or joint pain, and diabetes. If your BMI is in the underweight range, you may be at increased risk for health problems such as fatigue, lower protection (immunity) against illness, muscle loss, bone loss, hair loss, and hormone problems. BMI is just one measure of your risk for weight-related health problems. You may be at higher risk for health problems if you are not active, you eat an unhealthy diet, or you drink too much alcohol or use tobacco products. Follow-up care is a key part of your treatment and safety. Be sure to make and go to all appointments, and call your doctor if you are having problems. It's also a good idea to know your test results and keep a list of the medicines you take. How can you care for yourself at home? · Practice healthy eating habits. This includes eating plenty of fruits, vegetables, whole grains, lean protein, and low-fat dairy. · If your doctor recommends it, get more exercise. Walking is a good choice. Bit by bit, increase the amount you walk every day. Try for at least 30 minutes on most days of the week. · Do not smoke. Smoking can increase your risk for health problems.  If you need help quitting, talk to your doctor about stop-smoking programs and medicines. These can increase your chances of quitting for good. · Limit alcohol to 2 drinks a day for men and 1 drink a day for women. Too much alcohol can cause health problems. If you have a BMI higher than 25 · Your doctor may do other tests to check your risk for weight-related health problems. This may include measuring the distance around your waist. A waist measurement of more than 40 inches in men or 35 inches in women can increase the risk of weight-related health problems. · Talk with your doctor about steps you can take to stay healthy or improve your health. You may need to make lifestyle changes to lose weight and stay healthy, such as changing your diet and getting regular exercise. If you have a BMI lower than 18.5 · Your doctor may do other tests to check your risk for health problems. · Talk with your doctor about steps you can take to stay healthy or improve your health. You may need to make lifestyle changes to gain or maintain weight and stay healthy, such as getting more healthy foods in your diet and doing exercises to build muscle. Where can you learn more? Go to http://helio-patricia.info/. Enter S176 in the search box to learn more about \"Body Mass Index: Care Instructions. \" Current as of: October 13, 2016 Content Version: 11.4 © 8677-1442 [employer]wise[employer]. Care instructions adapted under license by [employer] (which disclaims liability or warranty for this information). If you have questions about a medical condition or this instruction, always ask your healthcare professional. [address] any warranty or liability for your use of this information. Introducing [facility]! Bridget [place] introduces [employer] patient portal. Now you can access parts of your medical record, email your doctor's office, and request medication refills online.    
 
1. In your internet browser, go to [email or web address] 2. Click on the First Time User? Click Here link in the Sign In box. You will see the New Member Sign Up page. 3. Enter your [employer] Access Code exactly as it appears below. You will not need to use this code after youve completed the sign-up process. If you do not sign up before the expiration date, you must request a new code. · [employer] Access Code: [email or web address] Expires: 11/19/2018  8:23 AM 
 
4. Enter the last four digits of your Social Security Number (xxxx) and Date of Birth (mm/dd/yyyy) as indicated and click Submit. You will be taken to the next sign-up page. 5. Create a amazingtunest ID. This will be your [employer] login ID and cannot be changed, so think of one that is secure and easy to remember. 6. Create a [employer] password. You can change your password at any time. 7. Enter your Password Reset Question and Answer. This can be used at a later time if you forget your password. 8. Enter your e-mail address. You will receive e-mail notification when new information is available in [address] 9. Click Sign Up. You can now view and download portions of your medical record. 10. Click the Download Summary menu link to download a portable copy of your medical information. If you have questions, please visit the Frequently Asked Questions section of the [employer] website. Remember, [employer] is NOT to be used for urgent needs. For medical emergencies, dial 911. Now available from your iPhone and Android! Please provide this summary of care documentation to your next provider. Your primary care clinician is listed as MANDY Henriquez. If you have any questions after today's visit, please call [phone].

## 2018-08-22 LAB
ALBUMIN SERPL-MCNC: 4.6 G/DL (ref 3.5–4.8)
ALBUMIN/GLOB SERPL: 1.5 {RATIO} (ref 1.2–2.2)
ALP SERPL-CCNC: 89 IU/L (ref 39–117)
ALT SERPL-CCNC: 14 IU/L (ref 0–32)
AST SERPL-CCNC: 19 IU/L (ref 0–40)
BILIRUB SERPL-MCNC: 0.3 MG/DL (ref 0–1.2)
BUN SERPL-MCNC: 11 MG/DL (ref 8–27)
BUN/CREAT SERPL: 13 (ref 12–28)
CALCIUM SERPL-MCNC: 9.8 MG/DL (ref 8.7–10.3)
CHLORIDE SERPL-SCNC: 103 MMOL/L (ref 96–106)
CHOLEST SERPL-MCNC: 245 MG/DL (ref 100–199)
CO2 SERPL-SCNC: 28 MMOL/L (ref 20–29)
CREAT SERPL-MCNC: 0.85 MG/DL (ref 0.57–1)
GLOBULIN SER CALC-MCNC: 3.1 G/DL (ref 1.5–4.5)
GLUCOSE SERPL-MCNC: 84 MG/DL (ref 65–99)
HDLC SERPL-MCNC: 99 MG/DL
LDLC SERPL CALC-MCNC: 129 MG/DL (ref 0–99)
POTASSIUM SERPL-SCNC: 4.7 MMOL/L (ref 3.5–5.2)
PROT SERPL-MCNC: 7.7 G/DL (ref 6–8.5)
SODIUM SERPL-SCNC: 146 MMOL/L (ref 134–144)
TRIGL SERPL-MCNC: 86 MG/DL (ref 0–149)
VLDLC SERPL CALC-MCNC: 17 MG/DL (ref 5–40)

## 2018-10-02 RX ORDER — NEBIVOLOL HYDROCHLORIDE 10 MG/1
TABLET ORAL
Qty: 90 TAB | Refills: 3 | Status: SHIPPED | OUTPATIENT
Start: 2018-10-02 | End: 2019-09-11 | Stop reason: SDUPTHER

## 2019-02-22 ENCOUNTER — OFFICE VISIT (OUTPATIENT)
Dept: INTERNAL MEDICINE CLINIC | Age: 74
End: 2019-02-22

## 2019-02-22 VITALS
SYSTOLIC BLOOD PRESSURE: 160 MMHG | BODY MASS INDEX: 26.17 KG/M2 | TEMPERATURE: 97.9 F | DIASTOLIC BLOOD PRESSURE: 80 MMHG | OXYGEN SATURATION: 98 % | WEIGHT: 142.2 LBS | HEART RATE: 52 BPM | HEIGHT: 62 IN | RESPIRATION RATE: 16 BRPM

## 2019-02-22 DIAGNOSIS — I10 ESSENTIAL HYPERTENSION: ICD-10-CM

## 2019-02-22 DIAGNOSIS — Z00.00 MEDICARE ANNUAL WELLNESS VISIT, SUBSEQUENT: Primary | ICD-10-CM

## 2019-02-22 DIAGNOSIS — R00.1 BRADYCARDIA: ICD-10-CM

## 2019-02-22 DIAGNOSIS — K50.80 CROHN'S DISEASE OF BOTH SMALL AND LARGE INTESTINE WITHOUT COMPLICATION (HCC): ICD-10-CM

## 2019-02-22 DIAGNOSIS — Z13.31 SCREENING FOR DEPRESSION: ICD-10-CM

## 2019-02-22 DIAGNOSIS — J45.40 MODERATE PERSISTENT ASTHMA WITHOUT COMPLICATION: ICD-10-CM

## 2019-02-22 DIAGNOSIS — E78.2 MIXED HYPERLIPIDEMIA: ICD-10-CM

## 2019-02-22 DIAGNOSIS — Z13.39 SCREENING FOR ALCOHOLISM: ICD-10-CM

## 2019-02-22 DIAGNOSIS — Z13.1 SCREENING FOR DIABETES MELLITUS: ICD-10-CM

## 2019-02-22 DIAGNOSIS — Z13.6 SCREENING FOR ISCHEMIC HEART DISEASE: ICD-10-CM

## 2019-02-22 LAB
A-G RATIO,AGRAT: 1.5 RATIO
ALBUMIN SERPL-MCNC: 4.7 G/DL (ref 3.9–5.4)
ALP SERPL-CCNC: 90 U/L (ref 38–126)
ALT SERPL-CCNC: 38 U/L (ref 9–52)
ANION GAP SERPL CALC-SCNC: 14 MMOL/L
AST SERPL W P-5'-P-CCNC: 30 U/L (ref 14–36)
BILIRUB SERPL-MCNC: 0.3 MG/DL (ref 0.2–1.3)
BILIRUB UR QL: NEGATIVE
BUN SERPL-MCNC: 17 MG/DL (ref 7–17)
BUN/CREATININE RATIO,BUCR: 17 RATIO
CALCIUM SERPL-MCNC: 10.5 MG/DL (ref 8.4–10.2)
CHLORIDE SERPL-SCNC: 104 MMOL/L (ref 98–107)
CHOL/HDL RATIO,CHHD: 2 RATIO (ref 0–4)
CHOLEST SERPL-MCNC: 245 MG/DL (ref 0–200)
CLARITY: CLEAR
CO2 SERPL-SCNC: 27 MMOL/L (ref 22–32)
COLOR UR: NORMAL
CREAT SERPL-MCNC: 1 MG/DL (ref 0.7–1.2)
ERYTHROCYTE [DISTWIDTH] IN BLOOD BY AUTOMATED COUNT: 14.7 %
GLOBULIN,GLOB: 3.1
GLUCOSE 24H UR-MRATE: NEGATIVE G/(24.H)
GLUCOSE SERPL-MCNC: 98 MG/DL (ref 65–105)
HCT VFR BLD AUTO: 40.6 % (ref 37–51)
HDLC SERPL-MCNC: 100 MG/DL (ref 35–130)
HGB BLD-MCNC: 12.8 G/DL (ref 12–18)
HGB UR QL STRIP: NEGATIVE
KETONES UR QL STRIP.AUTO: NEGATIVE
LDL/HDL RATIO,LDHD: 1 RATIO
LDLC SERPL CALC-MCNC: 129 MG/DL (ref 0–130)
LEUKOCYTE ESTERASE: NEGATIVE
MCH RBC QN AUTO: 23.1 PG (ref 26–32)
MCHC RBC AUTO-ENTMCNC: 31.5 G/DL (ref 30–36)
MCV RBC AUTO: 73.2 FL (ref 80–97)
NITRITE UR QL STRIP.AUTO: NEGATIVE
PH UR STRIP: 6 [PH] (ref 5–7)
PLATELET # BLD AUTO: 199 K/UL (ref 140–440)
PMV BLD AUTO: 10.8 FL
POTASSIUM SERPL-SCNC: 4.7 MMOL/L (ref 3.6–5)
PROT SERPL-MCNC: 7.8 G/DL (ref 6.3–8.2)
PROT UR STRIP-MCNC: NEGATIVE MG/DL
RBC # BLD AUTO: 5.55 M/UL (ref 4.2–6.3)
SODIUM SERPL-SCNC: 145 MMOL/L (ref 137–145)
SP GR UR REFRACTOMETRY: 1.01 (ref 1–1.03)
TRIGL SERPL-MCNC: 80 MG/DL (ref 0–200)
UROBILINOGEN UR QL STRIP.AUTO: NEGATIVE
VLDLC SERPL CALC-MCNC: 16 MG/DL
WBC # BLD AUTO: 6.6 K/UL (ref 4.1–10.9)

## 2019-02-22 NOTE — PROGRESS NOTES
This is the Subsequent Medicare Annual Wellness Exam, performed 12 months or more after the Initial AWV or the last Subsequent AWV I have reviewed the patient's medical history in detail and updated the computerized patient record. History Past Medical History:  
Diagnosis Date  Allergic rhinitis 6/28/2017  Arthritis  Asthma  Asthma 6/28/2017 Impressio: continue nebs and Dulera, prednisone taper  Bradycardia 6/28/2017 Impression: asymptomatic, continue Bystolic  Cataract, bilateral 6/28/2017 Impression: low risk for elective surgical procedure, proceed without further risk stratification, EKG shows sinus domingo without acute ST-T changes which is her baseline  Crohn's disease ([address]  Crohn's disease ([facility] [address] 6/28/2017 Impression: GI following  Degenerative arthritis 6/28/2017 Impression: trial of topical NSAID, if persists can consider an injection  Exposure to TB   
 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   
 crones  Hair loss 6/28/2017 Impression: refer to derm  Hematochezia 6/28/2017 Status is Inactive  Herpes Dx 2015  
 no outbreak as of 5/2/16  Hyperlipidemia 6/28/2017  Hypertension  Hypokalemia 6/28/2017  MRSA (methicillin resistant staph aureus) culture positive \"many years ago\" as of 5/2/16  
 pt states + nasal swab \"many years ago\", Tx and no + since; UNCONFIRMED  Neck pain 6/28/2017 Impresssion: finish PT, continue HEP, pain med prn  Osteoporosis 6/28/2017  Otitis media, acute 6/28/2017 Impression: follow up next week, finish abx Status is Inactive  Pneumonia 6/28/2017 Impression:  Completed course of antibiotics, clinically resolved Status is Inactive  Traumatic hematoma of scalp 6/28/2017 Status is Inactive  Ulcerative colitis ([facility] [address] 6/28/2017  Vaginal yeast infection 6/28/2017 Impression, consider probiotic, check blood sugar, follow up GYN Status is Inactive Past Surgical History:  
Procedure Laterality Date  HX CATARACT REMOVAL Right 3/28/16  HX MOHS PROCEDURES Right  HX ORTHOPAEDIC Left   
 left knee sx  HX ORTHOPAEDIC Right   
 trigger release  HX TUBAL LIGATION Current Outpatient Medications Medication Sig Dispense Refill  
 BYSTOLIC 10 mg tablet TAKE 1 TABLET DAILY 90 Tab 3  
 amLODIPine (NORVASC) 5 mg tablet Take 1 Tab by mouth daily. 90 Tab 3  
 olmesartan (BENICAR) 40 mg tablet Take 1 Tab by mouth daily. 90 Tab 3  
 YUVAFEM 10 mcg tab vaginal tablet Insert 1 Tab into vagina every Monday and Friday.  nystatin-triamcinolone (MYCOLOG II) topical cream as needed.  ALLERGIST TRAY 1CC 27GX1/2\" 1 mL 27 x 1/2\" syrg every seven (7) days. 99  
 Nebulizer Accessories kit Use as directed 1 Kit 3  cycloSPORINE (RESTASIS) 0.05 % ophthalmic emulsion Administer 1 Drop to both eyes two (2) times a day.  albuterol (PROAIR HFA) 90 mcg/actuation inhaler Take  by inhalation as needed for Wheezing.  valACYclovir (VALTREX) 1 gram tablet Take 1,000 mg by mouth daily.  mometasone-formoterol (DULERA) 100-5 mcg/actuation HFA inhaler Take 2 puffs by inhalation two (2) times a day.  mesalamine EC (ASACOL) 400 mg EC tablet Take 800 mg by mouth three (3) times daily.  hyoscyamine SL (LEVSIN/SL) 0.125 mg SL tablet 0.125 mg by SubLINGual route every four (4) hours.  CALCIUM CARBONATE (CALCIUM 300 PO) Take 600 mg by mouth daily.  Omeprazole delayed release (PRILOSEC D/R) 20 mg tablet Take 40 mg by mouth daily.  estradiol (VAGIFEM) 25 mcg vaginal tablet Insert 25 mcg into vagina two (2) days a week.  olopatadine (PATANASE) 0.6 % Spry 2 Squirts by Both Nostrils route two (2) times a day.  biotin 2,500 mcg Tab Take 5,000 mcg by mouth daily.  mesalamine DR (ASACOL HD) 800 mg DR tablet two (2) times a day.  DUREZOL 0.05 % ophthalmic emulsion 1 Drop by Does Not Apply route every Monday and Friday.  pirbuterol (MAXAIR AUTOHALER) 200 mcg/Inhalation inhaler Take 2 Puffs by inhalation four (4) times daily as needed. Allergies Allergen Reactions  Latex Rash  Aldactone [Spironolactone] Hives  Chocolate [Cocoa] Rash  Cinnamon Rash  Hydrocodone Other (comments) Vomiting, syncope  Lisinopril Hives Family History Problem Relation Age of Onset  Breast Cancer Maternal Aunt ?  Heart Disease Mother  Asthma Father  Stroke Father Social History Tobacco Use  Smoking status: Never Smoker  Smokeless tobacco: Never Used Substance Use Topics  Alcohol use: No  
 
Patient Active Problem List  
Diagnosis Code  Allergic rhinitis J30.9  [facility] K51.90  
 Bradycardia R00.1  Cataract, bilateral H26.9  Degenerative arthritis M19.90  
 HTN (hypertension) I10  
 Crohn's disease ([facility] [address] K50.90  Hyperlipidemia E78.5  Osteoporosis M81.0  Asthma J45.909  Hematochezia K92.1  Pneumonia J18.9  Moderate persistent asthma without complication L14.90  Ulcerative proctitis without complication (HCC) K10.87  Essential hypertension I10  
 Mixed hyperlipidemia E78.2 Depression Risk Factor Screening:  
 
3 most recent PHQ Screens 2/22/2019 Little interest or pleasure in doing things Not at all Feeling down, depressed, irritable, or hopeless Not at all Total Score PHQ 2 0 Alcohol Risk Factor Screening: You do not drink alcohol or very rarely. Functional Ability and Level of Safety:  
Hearing Loss Hearing is good. Activities of Daily Living The home contains: no safety equipment. Patient does total self care Fall Risk Fall Risk Assessment, last 12 mths 2/22/2019 Able to walk? Yes Fall in past 12 months? No  
 
 
Abuse Screen Patient is not abused Cognitive Screening Evaluation of Cognitive Function: 
Has your family/caregiver stated any concerns about your memory: no 
Normal 
 
Patient Care Team  
Patient Care Team: 
Joana Roa MD as PCP - General (Internal Medicine) Assessment/Plan Education and counseling provided: 
Are appropriate based on today's review and evaluation Diagnoses and all orders for this visit: 
 
1. Medicare annual wellness visit, subsequent 2. Essential hypertension -     METABOLIC PANEL, COMPREHENSIVE 
-     URINALYSIS W/O MICRO 3. Moderate persistent asthma without complication 4. Mixed hyperlipidemia -     LIPID PANEL 5. Bradycardia 6. Crohn's disease of both small and large intestine without complication (HCC) 
-     CBC W/O DIFF 7. Screening for alcoholism -     [place] ANNUAL ALCOHOL SCREEN 15 MIN 
 
8. Screening for depression 
-     [facility] 9. Screening for diabetes mellitus 10. Screening for ischemic heart disease Health Maintenance Due Topic Date Due  
 DTaP/Tdap/Td series (1 - Tdap) 05/28/1967  Shingrix Vaccine Age 50> (1 of 2) 05/28/1996  
 FOBT Q 1 YEAR AGE 50-75  05/28/1996  GLAUCOMA SCREENING Q2Y  05/28/2011  Influenza Age 5 to Adult  08/01/2018  MEDICARE YEARLY EXAM  02/03/2019 This note will not be viewable in [address] Gema Jansen is a 68 y.o. female and presents with Hypertension (6 month follow up) and Cholesterol Problem (6 month follow up) Sy Mendoza Subjective: 
 
Mrs. Melinda Cotton presents today for follow-up of hypertension, hyperlipidemia, history of ulcerative colitis followed by GI, history of asthma, allergic rhinitis, and osteoarthritis. She had a corticosteroid injection in her right knee recently and has a follow-up MRI scheduled per orthopedics. She is otherwise doing well on her current medical regimen.   She denies any side effects from her medicines. She has no shortness of breath, chest pain, palpitations, PND, orthopnea, or pedal edema. She has a known history of bradycardia and remains asymptomatic in this regard. She denies excessive fatigue, lightheadedness, or shortness of breath. She sees Dr. Francisco Zheng on a regular basis and has had a mammogram at [facility] and follow-up bone density test at [facility]. She has had a colonoscopy with Dr. [facility] within the last 3 years. Review of Systems Constitutional: negative for fevers, chills, anorexia and weight loss Eyes:   negative for visual disturbance and irritation ENT:   negative for tinnitus,sore throat,nasal congestion,ear pains. hoarseness Respiratory:  negative for cough, hemoptysis, dyspnea,wheezing CV:   negative for chest pain, palpitations, lower extremity edema GI:   negative for nausea, vomiting, diarrhea, abdominal pain,melena Endo:               negative for polyuria,polydipsia,polyphagia,heat intolerance Genitourinary: negative for frequency, dysuria and hematuria Integumentary: negative for rash and pruritus Hematologic:  negative for easy bruising and gum/nose bleeding Musculoskel: negative for myalgias, arthralgias, back pain, muscle weakness, joint pain Neurological:  negative for headaches, dizziness, vertigo, memory problems and gait Behavl/Psych: negative for feelings of anxiety, depression, mood changes Past Medical History:  
Diagnosis Date  Allergic rhinitis 6/28/2017  Arthritis  Asthma  Asthma 6/28/2017 Impressio: continue nebs and Dulera, prednisone taper  Bradycardia 6/28/2017 Impression: asymptomatic, continue Bystolic  Cataract, bilateral 6/28/2017 Impression: low risk for elective surgical procedure, proceed without further risk stratification, EKG shows sinus domingo without acute ST-T changes which is her baseline  Crohn's disease ([address]  Crohn's disease ([facility] [address] 6/28/2017 Impression: GI following  Degenerative arthritis 6/28/2017 Impression: trial of topical NSAID, if persists can consider an injection  Exposure to TB   
 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   
 crones  Hair loss 6/28/2017 Impression: refer to derm  Hematochezia 6/28/2017 Status is Inactive  Herpes Dx 2015  
 no outbreak as of 5/2/16  Hyperlipidemia 6/28/2017  Hypertension  Hypokalemia 6/28/2017  MRSA (methicillin resistant staph aureus) culture positive \"many years ago\" as of 5/2/16  
 pt states + nasal swab \"many years ago\", Tx and no + since; UNCONFIRMED  Neck pain 6/28/2017 Impresssion: finish PT, continue HEP, pain med prn  Osteoporosis 6/28/2017  Otitis media, acute 6/28/2017 Impression: follow up next week, finish abx Status is Inactive  Pneumonia 6/28/2017 Impression:  Completed course of antibiotics, clinically resolved Status is Inactive  Traumatic hematoma of scalp 6/28/2017 Status is Inactive  [facility] 6/28/2017  Vaginal yeast infection 6/28/2017 Impression, consider probiotic, check blood sugar, follow up GYN Status is Inactive Past Surgical History:  
Procedure Laterality Date  HX CATARACT REMOVAL Right 3/28/16  HX MOHS PROCEDURES Right  HX ORTHOPAEDIC Left   
 left knee sx  HX ORTHOPAEDIC Right   
 trigger release  HX TUBAL LIGATION Social History Socioeconomic History  Marital status:  Spouse name: Not on file  Number of children: Not on file  Years of education: Not on file  Highest education level: Not on file Tobacco Use  Smoking status: Never Smoker  Smokeless tobacco: Never Used Substance and Sexual Activity  Alcohol use: No  
 Drug use: No  
 Sexual activity: No  
 
Family History Problem Relation Age of Onset  Breast Cancer Maternal Aunt ?  Heart Disease Mother  Asthma Father  Stroke Father Current Outpatient Medications Medication Sig Dispense Refill  
 BYSTOLIC 10 mg tablet TAKE 1 TABLET DAILY 90 Tab 3  
 amLODIPine (NORVASC) 5 mg tablet Take 1 Tab by mouth daily. 90 Tab 3  
 olmesartan (BENICAR) 40 mg tablet Take 1 Tab by mouth daily. 90 Tab 3  
 YUVAFEM 10 mcg tab vaginal tablet Insert 1 Tab into vagina every Monday and Friday.  nystatin-triamcinolone (MYCOLOG II) topical cream as needed.  ALLERGIST TRAY 1CC 27GX1/2\" 1 mL 27 x 1/2\" syrg every seven (7) days. 99  
 Nebulizer Accessories kit Use as directed 1 Kit 3  cycloSPORINE (RESTASIS) 0.05 % ophthalmic emulsion Administer 1 Drop to both eyes two (2) times a day.  albuterol (PROAIR HFA) 90 mcg/actuation inhaler Take  by inhalation as needed for Wheezing.  valACYclovir (VALTREX) 1 gram tablet Take 1,000 mg by mouth daily.  mometasone-formoterol (DULERA) 100-5 mcg/actuation HFA inhaler Take 2 puffs by inhalation two (2) times a day.  mesalamine EC (ASACOL) 400 mg EC tablet Take 800 mg by mouth three (3) times daily.  hyoscyamine SL (LEVSIN/SL) 0.125 mg SL tablet 0.125 mg by SubLINGual route every four (4) hours.  CALCIUM CARBONATE (CALCIUM 300 PO) Take 600 mg by mouth daily.  Omeprazole delayed release (PRILOSEC D/R) 20 mg tablet Take 40 mg by mouth daily.  estradiol (VAGIFEM) 25 mcg vaginal tablet Insert 25 mcg into vagina two (2) days a week.  olopatadine (PATANASE) 0.6 % Spry 2 Squirts by Both Nostrils route two (2) times a day.  biotin 2,500 mcg Tab Take 5,000 mcg by mouth daily.  mesalamine DR (ASACOL HD) 800 mg DR tablet two (2) times a day.  DUREZOL 0.05 % ophthalmic emulsion 1 Drop by Does Not Apply route every Monday and Friday.  pirbuterol (MAXAIR AUTOHALER) 200 mcg/Inhalation inhaler Take 2 Puffs by inhalation four (4) times daily as needed. Allergies Allergen Reactions  Latex Rash  Aldactone [Spironolactone] Hives  Chocolate [Cocoa] Rash  Cinnamon Rash  Hydrocodone Other (comments) Vomiting, syncope  Lisinopril Hives Objective: 
Visit Vitals /80 (BP 1 Location: Left arm, BP Patient Position: Sitting) Pulse (!) 52 Temp 97.9 °F (36.6 °C) Resp 16 Ht 5' 2\" (1.575 m) Wt 142 lb 3.2 oz (64.5 kg) SpO2 98% BMI 26.01 kg/m² Physical Exam:  
General appearance - alert, well appearing, and in no distress Mental status - alert, oriented to person, place, and time EYE-YU[place], EOMI, fundi normal, corneas normal, no foreign bodies ENT-ENT exam normal, no neck nodes or sinus tenderness Nose - normal and patent, no erythema, discharge or polyps Mouth - mucous membranes moist, pharynx normal without lesions Neck - supple, no significant adenopathy Chest - clear to auscultation, no wheezes, rales or rhonchi, symmetric air entry Heart -bradycardic, regular rhythm, normal S1, S2, no murmurs, rubs, clicks or gallops Abdomen - soft, nontender, nondistended, no masses or organomegaly Lymph- no adenopathy palpable Ext-peripheral pulses normal, no pedal edema, no clubbing or cyanosis Skin-Warm and dry. no hyperpigmentation, vitiligo, or suspicious lesions Neuro -alert, oriented, normal speech, no focal findings or movement disorder noted Musculoskeletal- FROM, no bony abnormalities, no point tenderness Breast -deferred to GYN Pelvic -deferred to GYN No results found for this visit on 02/22/19. All results for lab orders may not have been returned by the time this encountered was closed. Assessment/Plan: 
 
Orders Placed This Encounter  Depression Screen Annual  
  CBC W/O DIFF ([facility])  METABOLIC PANEL, COMPREHENSIVE ([facility])  LIPID PANEL ([facility])  URINALYSIS W/O MICRO ([facility])  Annual  Alcohol Screen 15 min () Problem List Items Addressed This Visit Bradycardia Crohn's disease ([facility] [address] Relevant Orders CBC W/O DIFF Hyperlipidemia Relevant Orders LIPID PANEL Moderate persistent asthma without complication Essential hypertension Relevant Orders METABOLIC PANEL, COMPREHENSIVE URINALYSIS W/O MICRO Other Visit Diagnoses Medicare annual wellness visit, subsequent    -  Primary Screening for alcoholism Relevant Orders [place] ANNUAL ALCOHOL SCREEN 15 MIN Screening for depression Relevant Orders Piter Perez Screening for diabetes mellitus Screening for ischemic heart disease Plan: 
 
Blood pressure is moderately elevated today. She remains on Bystolic, amlodipine, and olmesartan. We will have her monitor her blood pressure at home and after reviewing labs we will schedule a follow-up blood pressure check in the office. The recommendations based on labs as ordered. Patient Instructions Medicare Wellness Visit, Female The best way to live healthy is to have a lifestyle where you eat a well-balanced diet, exercise regularly, limit alcohol use, and quit all forms of tobacco/nicotine, if applicable. Regular preventive services are another way to keep healthy. Preventive services (vaccines, screening tests, monitoring & exams) can help personalize your care plan, which helps you manage your own care. Screening tests can find health problems at the earliest stages, when they are easiest to treat. [facility] follows the current, evidence-based guidelines published by the [place] Aleksandr Zhong (USPSTF) when recommending preventive services for our patients.  Because we follow these guidelines, sometimes recommendations change over time as research supports it. (For example, mammograms used to be recommended annually. Even though Medicare will still pay for an annual mammogram, the newer guidelines recommend a mammogram every two years for women of average risk.) Of course, you and your doctor may decide to screen more often for some diseases, based on your risk and your health status. Preventive services for you include: - Medicare offers their members a free annual wellness visit, which is time for you and your primary care provider to discuss and plan for your preventive service needs. Take advantage of this benefit every year! 
-All adults over the age of 72 should receive the recommended pneumonia vaccines. Current USPSTF guidelines recommend a series of two vaccines for the best pneumonia protection.  
-All adults should have a flu vaccine yearly and a tetanus vaccine every 10 years. All adults age 61 and older should receive a shingles vaccine once in their lifetime.   
-A bone mass density test is recommended when a woman turns 65 to screen for osteoporosis. This test is only recommended one time, as a screening. Some providers will use this same test as a disease monitoring tool if you already have osteoporosis. -All adults age 38-68 who are overweight should have a diabetes screening test once every three years.  
-Other screening tests and preventive services for persons with diabetes include: an eye exam to screen for diabetic retinopathy, a kidney function test, a foot exam, and stricter control over your cholesterol.  
-Cardiovascular screening for adults with routine risk involves an electrocardiogram (ECG) at intervals determined by your doctor.  
-Colorectal cancer screenings should be done for adults age 54-65 with no increased risk factors for colorectal cancer. There are a number of acceptable methods of screening for this type of cancer.  Each test has its own benefits and drawbacks. Discuss with your doctor what is most appropriate for you during your annual wellness visit. The different tests include: colonoscopy (considered the best screening method), a fecal occult blood test, a fecal DNA test, and sigmoidoscopy. -Breast cancer screenings are recommended every other year for women of normal risk, age 54-69. 
-Cervical cancer screenings for women over age 72 are only recommended with certain risk factors.  
-All adults born between [facility] should be screened once for Hepatitis C. Here is a list of your current Health Maintenance items (your personalized list of preventive services) with a due date: 
Health Maintenance Due Topic Date Due  
 DTaP/Tdap/Td  (1 - Tdap) 05/28/1967  Shingles Vaccine (1 of 2) 05/28/1996  Stool testing for trace blood  05/28/1996  Glaucoma Screening   05/28/2011  Flu Vaccine  08/01/2018 [address] [place] [address] Annual Well Visit  02/03/2019 Follow-up Disposition: Not on File I have reviewed with the patient details of the assessment and plan and all questions were answered. Relevent patient education was performed. The most recent lab findings were reviewed with the patient. An After Visit Summary was printed and given to the patient.  
 
 
Gretchen Kline MD

## 2019-02-22 NOTE — PROGRESS NOTES
Identified pt with two pt identifiers(name and ). Reviewed record in preparation for visit and have obtained necessary documentation. Chief Complaint Patient presents with  Hypertension 6 month follow up  Cholesterol Problem 6 month follow up Visit Vitals /80 (BP 1 Location: Left arm, BP Patient Position: Sitting) Pulse (!) 52 Temp 97.9 °F (36.6 °C) Resp 16 Ht 5' 2\" (1.575 m) Wt 142 lb 3.2 oz (64.5 kg) SpO2 98% BMI 26.01 kg/m² Health Maintenance Due Topic  DTaP/Tdap/Td series (1 - Tdap)  Shingrix Vaccine Age 50> (1 of 2)  FOBT Q 1 YEAR AGE 50-75  GLAUCOMA SCREENING Q2Y  Influenza Age 5 to Adult  MEDICARE YEARLY EXAM   
 
 
Coordination of Care Questionnaire: 
:  
1) Have you been to an emergency room, urgent care, or hospitalized since your last visit?   no If yes, where when, and reason for visit? 2. Have seen or consulted any other health care provider since your last visit? NO If yes, where when, and reason for visit? 3) Do you have an Advanced Directive/ Living Will in place? NO If yes, do we have a copy on file NO If no, would you like information NO Patient is accompanied by self I have received verbal consent from Carly Eason to discuss any/all medical information while they are present in the room.

## 2019-02-22 NOTE — PATIENT INSTRUCTIONS
Medicare Wellness Visit, Female The best way to live healthy is to have a lifestyle where you eat a well-balanced diet, exercise regularly, limit alcohol use, and quit all forms of tobacco/nicotine, if applicable. Regular preventive services are another way to keep healthy. Preventive services (vaccines, screening tests, monitoring & exams) can help personalize your care plan, which helps you manage your own care. Screening tests can find health problems at the earliest stages, when they are easiest to treat. [facility] follows the current, evidence-based guidelines published by the [facility]ksandr Farheen ([facility]) when recommending preventive services for our patients. Because we follow these guidelines, sometimes recommendations change over time as research supports it. (For example, mammograms used to be recommended annually. Even though Medicare will still pay for an annual mammogram, the newer guidelines recommend a mammogram every two years for women of average risk.) Of course, you and your doctor may decide to screen more often for some diseases, based on your risk and your health status. Preventive services for you include: - Medicare offers their members a free annual wellness visit, which is time for you and your primary care provider to discuss and plan for your preventive service needs. Take advantage of this benefit every year! 
-All adults over the age of 72 should receive the recommended pneumonia vaccines. Current USPSTF guidelines recommend a series of two vaccines for the best pneumonia protection.  
-All adults should have a flu vaccine yearly and a tetanus vaccine every 10 years. All adults age 61 and older should receive a shingles vaccine once in their lifetime.   
-A bone mass density test is recommended when a woman turns 65 to screen for osteoporosis. This test is only recommended one time, as a screening. Some providers will use this same test as a disease monitoring tool if you already have osteoporosis. -All adults age 38-68 who are overweight should have a diabetes screening test once every three years.  
-Other screening tests and preventive services for persons with diabetes include: an eye exam to screen for diabetic retinopathy, a kidney function test, a foot exam, and stricter control over your cholesterol.  
-Cardiovascular screening for adults with routine risk involves an electrocardiogram (ECG) at intervals determined by your doctor.  
-Colorectal cancer screenings should be done for adults age 54-65 with no increased risk factors for colorectal cancer. There are a number of acceptable methods of screening for this type of cancer. Each test has its own benefits and drawbacks. Discuss with your doctor what is most appropriate for you during your annual wellness visit. The different tests include: colonoscopy (considered the best screening method), a fecal occult blood test, a fecal DNA test, and sigmoidoscopy. -Breast cancer screenings are recommended every other year for women of normal risk, age 54-69. 
-Cervical cancer screenings for women over age 72 are only recommended with certain risk factors.  
-All adults born between [facility] should be screened once for Hepatitis C. Here is a list of your current Health Maintenance items (your personalized list of preventive services) with a due date: 
Health Maintenance Due Topic Date Due  
 DTaP/Tdap/Td  (1 - Tdap) 05/28/1967  Shingles Vaccine (1 of 2) 05/28/1996  Stool testing for trace blood  05/28/1996  Glaucoma Screening   05/28/2011  Flu Vaccine  08/01/2018 [place] Annual Well Visit  02/03/2019

## 2019-03-21 ENCOUNTER — DOCUMENTATION ONLY (OUTPATIENT)
Dept: INTERNAL MEDICINE CLINIC | Age: 74
End: 2019-03-21

## 2019-03-21 NOTE — PROGRESS NOTES
Patient calls and states she hurt her back 2 [place] ago at the beach and she got flexeril she has re occurring pain form time to time and wanted Dr Nilsa Ferrari to call in rx - spoke with Dr Nilsa Ferrari he feels she needs to be seen - she states she will go to an urgent care near where she lives

## 2019-04-07 RX ORDER — AMLODIPINE BESYLATE 5 MG/1
5 TABLET ORAL DAILY
Qty: 90 TAB | Refills: 3 | Status: SHIPPED | OUTPATIENT
Start: 2019-04-07 | End: 2020-03-20 | Stop reason: SDUPTHER

## 2019-04-07 RX ORDER — OLMESARTAN MEDOXOMIL 40 MG/1
40 TABLET ORAL DAILY
Qty: 90 TAB | Refills: 3 | Status: SHIPPED | OUTPATIENT
Start: 2019-04-07 | End: 2020-03-20

## 2019-04-11 RX ORDER — VALSARTAN 160 MG/1
160 TABLET ORAL DAILY
Qty: 90 TAB | Refills: 3 | Status: SHIPPED | OUTPATIENT
Start: 2019-04-11 | End: 2020-03-20 | Stop reason: SDUPTHER

## 2019-04-30 ENCOUNTER — HOSPITAL ENCOUNTER (OUTPATIENT)
Dept: MAMMOGRAPHY | Age: 74
Discharge: HOME | End: 2019-04-30
Attending: OBSTETRICS & GYNECOLOGY
Payer: MEDICARE

## 2019-04-30 DIAGNOSIS — Z12.39 BREAST SCREENING, UNSPECIFIED: ICD-10-CM

## 2019-04-30 PROCEDURE — 77063 BREAST TOMOSYNTHESIS BI: CPT

## 2019-09-11 ENCOUNTER — OFFICE VISIT (OUTPATIENT)
Dept: INTERNAL MEDICINE CLINIC | Age: 74
End: 2019-09-11

## 2019-09-11 VITALS
DIASTOLIC BLOOD PRESSURE: 80 MMHG | RESPIRATION RATE: 20 BRPM | SYSTOLIC BLOOD PRESSURE: 152 MMHG | OXYGEN SATURATION: 99 % | HEIGHT: 62 IN | TEMPERATURE: 97.9 F | WEIGHT: 141.2 LBS | HEART RATE: 56 BPM | BODY MASS INDEX: 25.98 KG/M2

## 2019-09-11 DIAGNOSIS — M81.0 AGE-RELATED OSTEOPOROSIS WITHOUT CURRENT PATHOLOGICAL FRACTURE: ICD-10-CM

## 2019-09-11 DIAGNOSIS — K50.80 CROHN'S DISEASE OF BOTH SMALL AND LARGE INTESTINE WITHOUT COMPLICATION (HCC): ICD-10-CM

## 2019-09-11 DIAGNOSIS — I10 ESSENTIAL HYPERTENSION: Primary | ICD-10-CM

## 2019-09-11 DIAGNOSIS — J45.40 MODERATE PERSISTENT ASTHMA WITHOUT COMPLICATION: ICD-10-CM

## 2019-09-11 DIAGNOSIS — N39.0 URINARY TRACT INFECTION WITHOUT HEMATURIA, SITE UNSPECIFIED: ICD-10-CM

## 2019-09-11 DIAGNOSIS — E78.2 MIXED HYPERLIPIDEMIA: ICD-10-CM

## 2019-09-11 PROBLEM — K51.90 ULCERATIVE COLITIS (HCC): Status: RESOLVED | Noted: 2017-06-28 | Resolved: 2019-09-11

## 2019-09-11 LAB
A-G RATIO,AGRAT: 1.3 RATIO
ALBUMIN SERPL-MCNC: 4.4 G/DL (ref 3.9–5.4)
ALP SERPL-CCNC: 94 U/L (ref 38–126)
ALT SERPL-CCNC: 21 U/L (ref 0–35)
ANION GAP SERPL CALC-SCNC: 11 MMOL/L
AST SERPL W P-5'-P-CCNC: 23 U/L (ref 14–36)
BACTERIA,BACTU: ABNORMAL
BILIRUB SERPL-MCNC: 0.4 MG/DL (ref 0.2–1.3)
BILIRUB UR QL: NEGATIVE
BUN SERPL-MCNC: 13 MG/DL (ref 7–17)
BUN/CREATININE RATIO,BUCR: 16 RATIO
CALCIUM SERPL-MCNC: 9.7 MG/DL (ref 8.4–10.2)
CHLORIDE SERPL-SCNC: 104 MMOL/L (ref 98–107)
CHOL/HDL RATIO,CHHD: 2 RATIO (ref 0–4)
CHOLEST SERPL-MCNC: 236 MG/DL (ref 0–200)
CLARITY: CLEAR
CO2 SERPL-SCNC: 29 MMOL/L (ref 22–32)
COLOR UR: ABNORMAL
CREAT SERPL-MCNC: 0.8 MG/DL (ref 0.7–1.2)
GLOBULIN,GLOB: 3.3
GLUCOSE 24H UR-MRATE: NEGATIVE G/(24.H)
GLUCOSE SERPL-MCNC: 86 MG/DL (ref 65–105)
HDLC SERPL-MCNC: 98 MG/DL (ref 35–130)
HGB UR QL STRIP: NEGATIVE
KETONES UR QL STRIP.AUTO: NEGATIVE
LDL/HDL RATIO,LDHD: 1 RATIO
LDLC SERPL CALC-MCNC: 119 MG/DL (ref 0–130)
LEUKOCYTE ESTERASE: ABNORMAL
NITRITE UR QL STRIP.AUTO: NEGATIVE
PH UR STRIP: 7 [PH] (ref 5–7)
POTASSIUM SERPL-SCNC: 4.8 MMOL/L (ref 3.6–5)
PROT SERPL-MCNC: 7.7 G/DL (ref 6.3–8.2)
PROT UR STRIP-MCNC: NEGATIVE MG/DL
RBC #/AREA URNS HPF: 0 #/HPF
SODIUM SERPL-SCNC: 144 MMOL/L (ref 137–145)
SP GR UR REFRACTOMETRY: 1.01 (ref 1–1.03)
TRIGL SERPL-MCNC: 96 MG/DL (ref 0–200)
UROBILINOGEN UR QL STRIP.AUTO: NEGATIVE
VLDLC SERPL CALC-MCNC: 19 MG/DL
WBC URNS QL MICRO: ABNORMAL #/HPF

## 2019-09-11 RX ORDER — NEBIVOLOL HYDROCHLORIDE 10 MG/1
TABLET ORAL
Qty: 90 TAB | Refills: 3 | Status: SHIPPED | OUTPATIENT
Start: 2019-09-11 | End: 2020-09-21 | Stop reason: SDUPTHER

## 2019-09-11 NOTE — PROGRESS NOTES
This note will not be viewable in [address] Haseeb Bird is a 69 y.o. female and presents with Hypertension and Cholesterol Problem  . Subjective:  Mrs. Pippa Patiño presents today for follow-up of hypertension, and hyperlipidemia. She has a history of Crohn's disease and osteoporosis. Her bone density is monitored by her gynecologist.  She has a colonoscopy for Crohn's disease every 3 years. She denies shortness of breath, chest pain, palpitations, PND, orthopnea, or pedal edema. She denies any side effects from her medication. Her initial blood pressure was elevated but improved after sitting for a few minutes and retesting in the office. Review of Systems  Constitutional:   Eyes:   negative for visual disturbance and irritation  ENT:   negative for tinnitus,sore throat,nasal congestion,ear pains. hoarseness  Respiratory:  negative for cough, hemoptysis, dyspnea,wheezing  CV:   negative for chest pain, palpitations, lower extremity edema  GI:   negative for nausea, vomiting, diarrhea, abdominal pain,melena  Endo:               negative for polyuria,polydipsia,polyphagia,heat intolerance  Genitourinary: negative for frequency, dysuria and hematuria  Integumentary: negative for rash and pruritus  Hematologic:  negative for easy bruising and gum/nose bleeding  Musculoskel: negative for myalgias, arthralgias, back pain, muscle weakness, joint pain  Neurological:  negative for headaches, dizziness, vertigo, memory problems and gait   Behavl/Psych: negative for feelings of anxiety, depression, mood changes    Past Medical History:   Diagnosis Date    Allergic rhinitis 6/28/2017    Arthritis     Asthma     Asthma 6/28/2017    Impressio: continue nebs and Dulera, prednisone taper    Bradycardia 6/28/2017    Impression: asymptomatic, continue Bystolic    Cataract, bilateral 6/28/2017    Impression: low risk for elective surgical procedure, proceed without further risk stratification, EKG shows sinus domingo without acute ST-T changes which is her baseline    [facility] ([facility] [address]     Crohn's disease ([facility] [address] 6/28/2017    Impression: GI following    Degenerative arthritis 6/28/2017    Impression: trial of topical NSAID, if persists can consider an injection    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, 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    Fatigue 6/28/2017    Flank pain 6/28/2017    Impression: most likely secondary to Crohn's, not cardiac by history, EKG normal Status is Inactive      Gastrointestinal disorder     crones    Hair loss 6/28/2017    Impression: refer to derm    Hematochezia 6/28/2017    Status is Inactive    Herpes Dx 2015    no outbreak as of 5/2/16    Hyperlipidemia 6/28/2017    Hypertension     Hypokalemia 6/28/2017    MRSA (methicillin resistant staph aureus) culture positive \"many years ago\" as of 5/2/16    pt states + nasal swab \"many years ago\", Tx and no + since; UNCONFIRMED    Neck pain 6/28/2017    Impresssion: finish PT, continue HEP, pain med prn    Osteoporosis 6/28/2017    Otitis media, acute 6/28/2017    Impression: follow up next week, finish abx Status is Inactive    Pneumonia 6/28/2017    Impression:  Completed course of antibiotics, clinically resolved Status is Inactive    Traumatic hematoma of scalp 6/28/2017    Status is Inactive    Ulcerative colitis ([facility] [address] 6/28/2017    Vaginal yeast infection 6/28/2017    Impression, consider probiotic, check blood sugar, follow up GYN Status is Inactive     Past Surgical History:   Procedure Laterality Date    HX CATARACT REMOVAL Right 3/28/16    HX MOHS PROCEDURES Right     HX ORTHOPAEDIC Left     left knee sx    HX ORTHOPAEDIC Right     trigger release      HX TUBAL LIGATION       Social History     Socioeconomic History    Marital status:      Spouse name: Not on file    Number of children: Not on file    Years of education: Not on file    Highest education level: Not on file   Tobacco Use    Smoking status: Never Smoker    Smokeless tobacco: Never Used   Substance and Sexual Activity    Alcohol use: No    Drug use: No    Sexual activity: Never     Family History   Problem Relation Age of Onset    Breast Cancer Maternal Aunt         ?  Heart Disease Mother     Asthma Father     Stroke Father      Current Outpatient Medications   Medication Sig Dispense Refill    BYSTOLIC 10 mg tablet TAKE 1 TABLET DAILY 90 Tab 3    valsartan (DIOVAN) 160 mg tablet Take 1 Tab by mouth daily. 90 Tab 3    amLODIPine (NORVASC) 5 mg tablet TAKE 1 TAB BY MOUTH DAILY. 90 Tab 3    mesalamine DR (ASACOL HD) 800 mg DR tablet two (2) times a day.  YUVAFEM 10 mcg tab vaginal tablet Insert 1 Tab into vagina every Monday and Friday.  DUREZOL 0.05 % ophthalmic emulsion 1 Drop by Does Not Apply route every Monday and Friday.  nystatin-triamcinolone (MYCOLOG II) topical cream as needed.  ALLERGIST TRAY 1CC 27GX1/2\" 1 mL 27 x 1/2\" syrg every seven (7) days. 99    Nebulizer Accessories kit Use as directed 1 Kit 3    cycloSPORINE (RESTASIS) 0.05 % ophthalmic emulsion Administer 1 Drop to both eyes two (2) times a day.  albuterol (PROAIR HFA) 90 mcg/actuation inhaler Take  by inhalation as needed for Wheezing.  valACYclovir (VALTREX) 1 gram tablet Take 1,000 mg by mouth daily.  mometasone-formoterol (DULERA) 100-5 mcg/actuation HFA inhaler Take 2 puffs by inhalation two (2) times a day.  mesalamine EC (ASACOL) 400 mg EC tablet Take 800 mg by mouth three (3) times daily.  hyoscyamine SL (LEVSIN/SL) 0.125 mg SL tablet 0.125 mg by SubLINGual route every four (4) hours.  CALCIUM CARBONATE (CALCIUM 300 PO) Take 600 mg by mouth daily.  pirbuterol (MAXAIR AUTOHALER) 200 mcg/Inhalation inhaler Take 2 Puffs by inhalation four (4) times daily as needed.  Omeprazole delayed release (PRILOSEC D/R) 20 mg tablet Take 40 mg by mouth daily.       estradiol (VAGIFEM) 25 mcg vaginal tablet Insert 25 mcg into vagina two (2) days a week.  olopatadine (PATANASE) 0.6 % Spry 2 Squirts by Both Nostrils route two (2) times a day.  biotin 2,500 mcg Tab Take 5,000 mcg by mouth daily.  olmesartan (BENICAR) 40 mg tablet TAKE 1 TAB BY MOUTH DAILY. 90 Tab 3     Allergies   Allergen Reactions    Latex Rash    Aldactone [Spironolactone] Hives    Chocolate [Cocoa] Rash    Cinnamon Rash    Hydrocodone Other (comments)     Vomiting, syncope    Lisinopril Hives       Objective:  Visit Vitals  /80 (BP 1 Location: Right arm, BP Patient Position: Sitting)   Pulse (!) 56   Temp 97.9 °F (36.6 °C) (Oral)   Resp 20   Ht 5' 2\" (1.575 m)   Wt 141 lb 3.2 oz (64 kg)   SpO2 99%   BMI 25.83 kg/m²     Physical Exam:   General appearance - alert, well appearing, and in no distress  Mental status - alert, oriented to person, place, and time  EYE-YUDELKA, EOMI, fundi normal, corneas normal, no foreign bodies  ENT-ENT exam normal, no neck nodes or sinus tenderness  Nose - normal and patent, no erythema, discharge or polyps  Mouth - mucous membranes moist, pharynx normal without lesions  Neck - supple, no significant adenopathy   Chest - clear to auscultation, no wheezes, rales or rhonchi, symmetric air entry   Heart - normal rate, regular rhythm, normal S1, S2, no murmurs, rubs, clicks or gallops   Abdomen - soft, nontender, nondistended, no masses or organomegaly  Lymph- no adenopathy palpable  Ext-peripheral pulses normal, no pedal edema, no clubbing or cyanosis  Skin-Warm and dry. no hyperpigmentation, vitiligo, or suspicious lesions  Neuro -alert, oriented, normal speech, no focal findings or movement disorder noted  Musculoskeletal- FROM, no bony abnormalities, no point tenderness    No results found for this visit on 09/11/19. All results for lab orders may not have been returned by the time this encountered was closed. Assessment/Plan:       ICD-10-CM ICD-9-CM    1.  Essential hypertension D76 119.3 METABOLIC PANEL, COMPREHENSIVE      URINALYSIS W/O MICRO   2. Moderate persistent asthma without complication S96.26 243.74    3. Age-related osteoporosis without current pathological fracture M81.0 733.01    4. Mixed hyperlipidemia E78.2 272.2 LIPID PANEL   5. Crohn's disease of both small and large intestine without complication (HCC) T08.95 555.2        Orders Placed This Encounter    LIPID PANEL ([facility])    METABOLIC PANEL, COMPREHENSIVE ([facility])    URINALYSIS W/O MICRO ([facility])    BYSTOLIC 10 mg tablet     Sig: TAKE 1 TABLET DAILY     Dispense:  90 Tab     Refill:  3       Follow-up and Dispositions    · Return in about 6 months (around 3/11/2020). Plan:    Continue current medical regimen as outlined above. Further recommendations based on labs as ordered. The patient will monitor her blood pressure at home and if she has persistently elevated readings we will follow-up for further evaluation. I have reviewed with the patient details of the assessment and plan and all questions were answered. Relevent patient education was performed. Verbal and/or written instructions (see AVS) provided. The most recent lab findings were reviewed with the patient. Plan was discussed with patient who verbal expressed understanding. An After Visit Summary was printed and given to the patient.       Dianne Jean-Baptiste MD

## 2019-09-11 NOTE — PROGRESS NOTES
Reviewed record in preparation for visit and have obtained necessary documentation. Identified pt with two pt identifiers(name and ). Chief Complaint   Patient presents with    Hypertension    Cholesterol Problem        Coordination of Care Questionnaire:  :     1) Have you been to an emergency room, urgent care clinic since your last visit? Yes Urgent Care     Hospitalized since your last visit? No       2) Have you seen or consulted any other health care providers outside of [address] [place] [address] since your last visit?  No

## 2019-09-13 LAB — BACTERIA UR CULT: NORMAL

## 2019-10-29 RX ORDER — NEBIVOLOL HYDROCHLORIDE 10 MG/1
TABLET ORAL
Qty: 90 TAB | Refills: 3 | Status: SHIPPED | OUTPATIENT
Start: 2019-10-29 | End: 2021-01-15 | Stop reason: SDUPTHER

## 2019-12-10 ENCOUNTER — APPOINTMENT (OUTPATIENT)
Dept: CT IMAGING | Age: 74
End: 2019-12-10
Attending: EMERGENCY MEDICINE
Payer: COMMERCIAL

## 2019-12-10 ENCOUNTER — HOSPITAL ENCOUNTER (EMERGENCY)
Age: 74
Discharge: HOME | End: 2019-12-10
Attending: EMERGENCY MEDICINE
Payer: COMMERCIAL

## 2019-12-10 VITALS
HEART RATE: 70 BPM | HEIGHT: 62 IN | WEIGHT: 143.74 LBS | DIASTOLIC BLOOD PRESSURE: 72 MMHG | BODY MASS INDEX: 26.45 KG/M2 | RESPIRATION RATE: 16 BRPM | OXYGEN SATURATION: 100 % | SYSTOLIC BLOOD PRESSURE: 180 MMHG | TEMPERATURE: 98 F

## 2019-12-10 DIAGNOSIS — R09.89 FOREIGN BODY SENSATION IN THROAT: Primary | ICD-10-CM

## 2019-12-10 PROCEDURE — 71260 CT THORAX DX C+: CPT

## 2019-12-10 PROCEDURE — 74011636320 HC RX REV CODE- 636/320: Performed by: EMERGENCY MEDICINE

## 2019-12-10 PROCEDURE — 99283 EMERGENCY DEPT VISIT LOW MDM: CPT

## 2019-12-10 RX ORDER — SODIUM CHLORIDE 0.9 % (FLUSH) 0.9 %
10 SYRINGE (ML) INJECTION
Status: COMPLETED | OUTPATIENT
Start: 2019-12-10 | End: 2019-12-10

## 2019-12-10 RX ADMIN — Medication 10 ML: at 12:08

## 2019-12-10 RX ADMIN — IOPAMIDOL 100 ML: 755 INJECTION, SOLUTION INTRAVENOUS at 12:07

## 2019-12-10 NOTE — ED PROVIDER NOTES
EMERGENCY DEPARTMENT HISTORY AND PHYSICAL EXAM      Date: 12/10/2019  Patient Name: Carolin Choudhary  Patient Age and Sex: 69 y.o. female     History of Presenting Illness     Chief Complaint   Patient presents with    Foreign Body Swallowed     pt states \"I feel like I have a blockage in my throat\", reports symptoms x1 month, was seen at Urgent Care a week ago       History Provided By: Patient    HPI: Carolin Choudhary  Is a 80-year-old female with past medical history of Crohn's disease presenting today with foreign body sensation in her mid chest.  Patient reports that she has had symptoms like this for the past several days. She states that she has had no prior history of Crohn's flare in the upper GI tract. She was seen by her primary doctor and was given steroids which did improve her symptoms, but she has continued to have foreign body sensation. She is able to swallow liquids, but has burping afterwards, and she has been able to tolerate solids without any vomiting. There are no other complaints, changes, or physical findings at this time. PCP: Analisa Sorto MD    No current facility-administered medications on file prior to encounter. Current Outpatient Medications on File Prior to Encounter   Medication Sig Dispense Refill    BYSTOLIC 10 mg tablet TAKE 1 TABLET DAILY 90 Tab 3    BYSTOLIC 10 mg tablet TAKE 1 TABLET DAILY 90 Tab 3    valsartan (DIOVAN) 160 mg tablet Take 1 Tab by mouth daily. 90 Tab 3    amLODIPine (NORVASC) 5 mg tablet TAKE 1 TAB BY MOUTH DAILY. 90 Tab 3    olmesartan (BENICAR) 40 mg tablet TAKE 1 TAB BY MOUTH DAILY. 90 Tab 3    mesalamine DR (ASACOL HD) 800 mg DR tablet two (2) times a day.  YUVAFEM 10 mcg tab vaginal tablet Insert 1 Tab into vagina every Monday and Friday.  DUREZOL 0.05 % ophthalmic emulsion 1 Drop by Does Not Apply route every Monday and Friday.  nystatin-triamcinolone (MYCOLOG II) topical cream as needed.       ALLERGIST TRAY 1CC 27GX1/2\" 1 mL 27 x 1/2\" syrg every seven (7) days. 99    Nebulizer Accessories kit Use as directed 1 Kit 3    cycloSPORINE (RESTASIS) 0.05 % ophthalmic emulsion Administer 1 Drop to both eyes two (2) times a day.  albuterol (PROAIR HFA) 90 mcg/actuation inhaler Take  by inhalation as needed for Wheezing.  valACYclovir (VALTREX) 1 gram tablet Take 1,000 mg by mouth daily.  mometasone-formoterol (DULERA) 100-5 mcg/actuation HFA inhaler Take 2 puffs by inhalation two (2) times a day.  mesalamine EC (ASACOL) 400 mg EC tablet Take 800 mg by mouth three (3) times daily.  hyoscyamine SL (LEVSIN/SL) 0.125 mg SL tablet 0.125 mg by SubLINGual route every four (4) hours.  CALCIUM CARBONATE (CALCIUM 300 PO) Take 600 mg by mouth daily.  pirbuterol (MAXAIR AUTOHALER) 200 mcg/Inhalation inhaler Take 2 Puffs by inhalation four (4) times daily as needed.  Omeprazole delayed release (PRILOSEC D/R) 20 mg tablet Take 40 mg by mouth daily.  estradiol (VAGIFEM) 25 mcg vaginal tablet Insert 25 mcg into vagina two (2) days a week.  olopatadine (PATANASE) 0.6 % Spry 2 Squirts by Both Nostrils route two (2) times a day.  biotin 2,500 mcg Tab Take 5,000 mcg by mouth daily.            Past History     Past Medical History:  Past Medical History:   Diagnosis Date    Allergic rhinitis 6/28/2017    Arthritis     Asthma     Asthma 6/28/2017    Impressio: continue nebs and Dulera, prednisone taper    Bradycardia 6/28/2017    Impression: asymptomatic, continue Bystolic    Cataract, bilateral 6/28/2017    Impression: low risk for elective surgical procedure, proceed without further risk stratification, EKG shows sinus domingo without acute ST-T changes which is her baseline    Crohn's disease ([address]     Crohn's disease ([address] 6/28/2017    Impression: GI following    Degenerative arthritis 6/28/2017    Impression: trial of topical NSAID, if persists can consider an injection    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, 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    Fatigue 6/28/2017    Flank pain 6/28/2017    Impression: most likely secondary to Crohn's, not cardiac by history, EKG normal Status is Inactive      Gastrointestinal disorder     crones    Hair loss 6/28/2017    Impression: refer to derm    Hematochezia 6/28/2017    Status is Inactive    Herpes Dx 2015    no outbreak as of 5/2/16    Hyperlipidemia 6/28/2017    Hypertension     Hypokalemia 6/28/2017    MRSA (methicillin resistant staph aureus) culture positive \"many years ago\" as of 5/2/16    pt states + nasal swab \"many years ago\", Tx and no + since; UNCONFIRMED    Neck pain 6/28/2017    Impresssion: finish PT, continue HEP, pain med prn    Osteoporosis 6/28/2017    Otitis media, acute 6/28/2017    Impression: follow up next week, finish abx Status is Inactive    Pneumonia 6/28/2017    Impression:  Completed course of antibiotics, clinically resolved Status is Inactive    Traumatic hematoma of scalp 6/28/2017    Status is Inactive    Ulcerative colitis ([facility] [address] 6/28/2017    Vaginal yeast infection 6/28/2017    Impression, consider probiotic, check blood sugar, follow up GYN Status is Inactive       Past Surgical History:  Past Surgical History:   Procedure Laterality Date    HX CATARACT REMOVAL Right 3/28/16    HX MOHS PROCEDURES Right     HX ORTHOPAEDIC Left     left knee sx    HX ORTHOPAEDIC Right     trigger release      HX TUBAL LIGATION         Family History:  Family History   Problem Relation Age of Onset    Breast Cancer Maternal Aunt         ?  Heart Disease Mother     Asthma Father     Stroke Father        Social History:  Social History     Tobacco Use    Smoking status: Never Smoker    Smokeless tobacco: Never Used   Substance Use Topics    Alcohol use: No    Drug use: No       Allergies:   Allergies   Allergen Reactions    Latex Rash    Aldactone [Spironolactone] Hives    Chocolate [Cocoa] Rash    Cinnamon Rash    Hydrocodone Other (comments)     Vomiting, syncope    Lisinopril Hives         Review of Systems   Constitutional: No  fever,  No  headache  Skin: No  rash, No  jaundice  HEENT: No  nasal congestion, No  eye drainage. Resp: No cough,  No  wheezing  CV: + chest pain, No  palpitations  GI: No vomiting,  No  diarrhea.,  No  constipation  : No dysuria,  No  hematuria  MSK: No joint pain,  No  trauma  Neuro: No numbness, No  tingling  Psych: No suicidal, No  paranoid      Physical Exam     Patient Vitals for the past 12 hrs:   Temp Pulse Resp BP SpO2   12/10/19 1130    180/72 100 %   12/10/19 1032 98 °F (36.7 °C) 70 16 (!) 222/88 100 %     General: alert, No acute distress  Eyes: EOMI, normal conjunctiva  ENT: moist mucous membranes. Neck: Active, full ROM of neck. Skin: No rashes. no jaundice              Lungs: Equal chest expansion. no respiratory distress. clear to auscultation bilaterally No accessory muscle usage  Heart: regular rate     no peripheral edema   2+ radial pulses and DPs bilaterally  Abd:  non distended soft, nontender. No rebound tenderness. No guarding  Back: Full ROM  MSK: Full, active ROM in all 4 extremities. Neuro: Person, Place, Time and Situation; normal speech;   Psych: Cooperative with exam; Appropriate mood and affect             Diagnostic Study Results     Labs -   No results found for this or any previous visit (from the past 12 hour(s)). Radiologic Studies -   CT CHEST W CONT   Final Result   IMPRESSION:   No acute abnormality. No radiopaque foreign body is identified. CT Results  (Last 48 hours)               12/10/19 1205  CT CHEST W CONT Final result    Impression:  IMPRESSION:   No acute abnormality. No radiopaque foreign body is identified. Narrative:  INDICATION: Hx of chron's.  foreign body sensation       COMPARISON: None       TECHNIQUE:  Following the uneventful intravenous administration of 100 cc   Isovue-300, 5 mm axial images were obtained through the chest. Coronal and   sagittal reconstructions were generated. CT dose reduction was achieved through   use of a standardized protocol tailored for this examination and automatic   exposure control for dose modulation. FINDINGS:       THYROID: No nodule. MEDIASTINUM: No mass or lymphadenopathy. GAEL: No mass or lymphadenopathy. THORACIC AORTA: No dissection or aneurysm. MAIN PULMONARY ARTERY: Normal in caliber. TRACHEA/BRONCHI: Patent. ESOPHAGUS: No wall thickening or dilatation. HEART: Normal in size. PLEURA: No effusion or pneumothorax. LUNGS: No nodule, mass, or airspace disease. INCIDENTALLY IMAGED UPPER ABDOMEN: No focal abnormality. BONES: No destructive bone lesion. CXR Results  (Last 48 hours)    None            Medical Decision Making     Differential Diagnosis: Crohn's flare, foreign body, esophageal stricture, esophagitis    I reviewed the vital signs, available nursing notes, past medical history, past surgical history, family history and social history and old medical records. On my interpretation of the radiology studies CT of the chest shows no evidence of foreign body, or obvious esophageal abnormality. Management/ED course: Patient presents with foreign body sensation in the mid chest.  She does have runs, and had improvement of her symptoms with steroids, CT shows no evidence of Crohn's flare or foreign body. I encouraged the patient to follow-up with GI. Patient is comfortable this plan. Dispo: Discharged. The patient has been re-evaluated and is ready for discharge. Reviewed available results with patient. Counseled patient on diagnosis and care plan. Patient has expressed understanding, and all questions have been answered. Patient agrees with plan and agrees to follow up as recommended, or to return to the ED if their symptoms worsen.  Discharge instructions have been provided and explained to the patient, along with reasons to return to the ED. PLAN:  Discharge Medication List as of 12/10/2019 12:37 PM      1.   2.     Follow-up Information     Follow up With Specialties Details Why Contact Info    Gris Ly MD Gastroenterology   [address] [place] [address] [phone]          3. Return to ED if worse     Diagnosis     Clinical Impression:   1.  Foreign body sensation in throat        Attestations:    Almita Cook MD

## 2019-12-10 NOTE — ED NOTES
Patient presents with c/o feeling like something was caught in her throat. Patient reported that she is able to eat and drink but cannot rid herself of the sensation that something is in her throat. Patient alert, respirations even and unlabored. Patient able to speak in full sentences and does not appear to be in respiratory distress at this time. Will continue to monitor and assess patient needs.

## 2020-03-20 ENCOUNTER — OFFICE VISIT (OUTPATIENT)
Dept: INTERNAL MEDICINE CLINIC | Age: 75
End: 2020-03-20

## 2020-03-20 VITALS
OXYGEN SATURATION: 99 % | HEIGHT: 62 IN | TEMPERATURE: 97.8 F | HEART RATE: 57 BPM | RESPIRATION RATE: 16 BRPM | DIASTOLIC BLOOD PRESSURE: 84 MMHG | SYSTOLIC BLOOD PRESSURE: 148 MMHG | WEIGHT: 144.8 LBS | BODY MASS INDEX: 26.65 KG/M2

## 2020-03-20 DIAGNOSIS — E78.2 MIXED HYPERLIPIDEMIA: ICD-10-CM

## 2020-03-20 DIAGNOSIS — K50.80 CROHN'S DISEASE OF BOTH SMALL AND LARGE INTESTINE WITHOUT COMPLICATION (HCC): ICD-10-CM

## 2020-03-20 DIAGNOSIS — I10 ESSENTIAL HYPERTENSION: Primary | ICD-10-CM

## 2020-03-20 DIAGNOSIS — Z00.00 ANNUAL PHYSICAL EXAM: ICD-10-CM

## 2020-03-20 DIAGNOSIS — J45.40 MODERATE PERSISTENT ASTHMA WITHOUT COMPLICATION: ICD-10-CM

## 2020-03-20 LAB
A-G RATIO,AGRAT: 1.4 RATIO
ALBUMIN SERPL-MCNC: 4.5 G/DL (ref 3.9–5.4)
ALP SERPL-CCNC: 81 U/L (ref 38–126)
ALT SERPL-CCNC: 19 U/L (ref 0–35)
ANION GAP SERPL CALC-SCNC: 12 MMOL/L
AST SERPL W P-5'-P-CCNC: 22 U/L (ref 14–36)
BACTERIA,BACTU: ABNORMAL
BILIRUB SERPL-MCNC: 0.5 MG/DL (ref 0.2–1.3)
BILIRUB UR QL: NEGATIVE
BUN SERPL-MCNC: 16 MG/DL (ref 7–17)
BUN/CREATININE RATIO,BUCR: 16 RATIO
CALCIUM SERPL-MCNC: 9.9 MG/DL (ref 8.4–10.2)
CHLORIDE SERPL-SCNC: 106 MMOL/L (ref 98–107)
CHOL/HDL RATIO,CHHD: 2 RATIO (ref 0–4)
CHOLEST SERPL-MCNC: 269 MG/DL (ref 0–200)
CLARITY: CLEAR
CO2 SERPL-SCNC: 27 MMOL/L (ref 22–32)
COLOR UR: ABNORMAL
CREAT SERPL-MCNC: 1 MG/DL (ref 0.7–1.2)
GLOBULIN,GLOB: 3.3
GLUCOSE 24H UR-MRATE: NEGATIVE G/(24.H)
GLUCOSE SERPL-MCNC: 93 MG/DL (ref 65–105)
HDLC SERPL-MCNC: 112 MG/DL (ref 35–130)
HGB UR QL STRIP: NEGATIVE
KETONES UR QL STRIP.AUTO: NEGATIVE
LDL/HDL RATIO,LDHD: 1 RATIO
LDLC SERPL CALC-MCNC: 138 MG/DL (ref 0–130)
LEUKOCYTE ESTERASE: NEGATIVE
NITRITE UR QL STRIP.AUTO: NEGATIVE
PH UR STRIP: 6 [PH] (ref 5–7)
POTASSIUM SERPL-SCNC: 4.7 MMOL/L (ref 3.6–5)
PROT SERPL-MCNC: 7.8 G/DL (ref 6.3–8.2)
PROT UR STRIP-MCNC: ABNORMAL MG/DL
RBC #/AREA URNS HPF: 0 #/HPF
SODIUM SERPL-SCNC: 145 MMOL/L (ref 137–145)
SP GR UR REFRACTOMETRY: 1.02 (ref 1–1.03)
SQUAMOUS EPITHELIAL CELLS: ABNORMAL
TRIGL SERPL-MCNC: 93 MG/DL (ref 0–200)
UROBILINOGEN UR QL STRIP.AUTO: NEGATIVE
VLDLC SERPL CALC-MCNC: 19 MG/DL
WBC URNS QL MICRO: ABNORMAL #/HPF

## 2020-03-20 RX ORDER — VALSARTAN 160 MG/1
160 TABLET ORAL DAILY
Qty: 90 TAB | Refills: 3 | Status: SHIPPED | OUTPATIENT
Start: 2020-03-20 | End: 2021-03-16 | Stop reason: SDUPTHER

## 2020-03-20 RX ORDER — AMLODIPINE BESYLATE 5 MG/1
5 TABLET ORAL DAILY
Qty: 90 TAB | Refills: 3 | Status: SHIPPED | OUTPATIENT
Start: 2020-03-20 | End: 2021-03-17 | Stop reason: SDUPTHER

## 2020-03-20 NOTE — PROGRESS NOTES
Reviewed record in preparation for visit and have obtained necessary documentation. Identified pt with two pt identifiers(name and ). Chief Complaint   Patient presents with    Complete Physical        Coordination of Care Questionnaire:  :     1) Have you been to an emergency room, urgent care clinic since your last visit? Yes [facility] 12/10/2019    Hospitalized since your last visit? No               2) Have you seen or consulted any other health care providers outside of [address] [place] [address] since your last visit?   Yes Dr Sary Mahajan

## 2020-03-20 NOTE — PROGRESS NOTES
This note will not be viewable in [address] Nette Hurt is a 69 y.o. female and presents with Complete Physical  .    Subjective:    Mrs. Tia Mercado presents today for complete physical exam and follow-up of several problems including hypertension, asthma, history of Crohn's disease, hyperlipidemia. She is doing well on her current medical regimen. She has had some congestion and symptoms consistent with her history of allergic rhinitis and asthma. She denies fever chills or cough. She used her pro-air inhaler this morning with excellent results. She denies any shortness of breath. She remains on allergy shots and is also to over-the-counter Claritin as well as her scheduled inhalers. She denies chest pain, palpitations, PND, orthopnea, or pedal edema. She did have some dysphasia and was evaluated by GI and had esophageal dilation earlier in the year. She has a known history of Crohn's disease as well as a hiatal hernia. Review of Systems  Constitutional:   Eyes:   negative for visual disturbance and irritation  ENT:   negative for tinnitus,sore throat,nasal congestion,ear pains. hoarseness  Respiratory:  negative for cough, hemoptysis, dyspnea,wheezing  CV:   negative for chest pain, palpitations, lower extremity edema  GI:   negative for nausea, vomiting, diarrhea, abdominal pain,melena  Endo:               negative for polyuria,polydipsia,polyphagia,heat intolerance  Genitourinary: negative for frequency, dysuria and hematuria  Integumentary: negative for rash and pruritus  Hematologic:  negative for easy bruising and gum/nose bleeding  Musculoskel: negative for myalgias, arthralgias, back pain, muscle weakness, joint pain  Neurological:  negative for headaches, dizziness, vertigo, memory problems and gait   Behavl/Psych: negative for feelings of anxiety, depression, mood changes    Past Medical History:   Diagnosis Date    Allergic rhinitis 6/28/2017    Arthritis     Asthma     Asthma 6/28/2017    Impressio: continue nebs and Dulera, prednisone taper    Bradycardia 6/28/2017    Impression: asymptomatic, continue Bystolic    Cataract, bilateral 6/28/2017    Impression: low risk for elective surgical procedure, proceed without further risk stratification, EKG shows sinus domingo without acute ST-T changes which is her baseline    Crohn's disease ([address]     Crohn's disease ([address] 6/28/2017    Impression: GI following    Degenerative arthritis 6/28/2017    Impression: trial of topical NSAID, if persists can consider an injection    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, 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    Fatigue 6/28/2017    Flank pain 6/28/2017    Impression: most likely secondary to Crohn's, not cardiac by history, EKG normal Status is Inactive      Gastrointestinal disorder     crones    Hair loss 6/28/2017    Impression: refer to derm    Hematochezia 6/28/2017    Status is Inactive    Herpes Dx 2015    no outbreak as of 5/2/16    Hyperlipidemia 6/28/2017    Hypertension     Hypokalemia 6/28/2017    MRSA (methicillin resistant staph aureus) culture positive \"many years ago\" as of 5/2/16    pt states + nasal swab \"many years ago\", Tx and no + since; UNCONFIRMED    Neck pain 6/28/2017    Impresssion: finish PT, continue HEP, pain med prn    Osteoporosis 6/28/2017    Otitis media, acute 6/28/2017    Impression: follow up next week, finish abx Status is Inactive    Pneumonia 6/28/2017    Impression:  Completed course of antibiotics, clinically resolved Status is Inactive    Traumatic hematoma of scalp 6/28/2017    Status is Inactive    Ulcerative colitis ([address] 6/28/2017    Vaginal yeast infection 6/28/2017    Impression, consider probiotic, check blood sugar, follow up GYN Status is Inactive     Past Surgical History:   Procedure Laterality Date    HX CATARACT REMOVAL Right 3/28/16    HX MOHS PROCEDURES Right     HX ORTHOPAEDIC Left     left knee sx    HX ORTHOPAEDIC Right     trigger release      HX TUBAL LIGATION       Social History     Socioeconomic History    Marital status:      Spouse name: Not on file    Number of children: Not on file    Years of education: Not on file    Highest education level: Not on file   Tobacco Use    Smoking status: Never Smoker    Smokeless tobacco: Never Used   Substance and Sexual Activity    Alcohol use: No    Drug use: No    Sexual activity: Never     Family History   Problem Relation Age of Onset    Breast Cancer Maternal Aunt         ?  Heart Disease Mother     Asthma Father     Stroke Father      Current Outpatient Medications   Medication Sig Dispense Refill    amLODIPine (NORVASC) 5 mg tablet Take 1 Tab by mouth daily. 90 Tab 3    valsartan (DIOVAN) 160 mg tablet Take 1 Tab by mouth daily. 90 Tab 3    BYSTOLIC 10 mg tablet TAKE 1 TABLET DAILY 90 Tab 3    BYSTOLIC 10 mg tablet TAKE 1 TABLET DAILY 90 Tab 3    YUVAFEM 10 mcg tab vaginal tablet Insert 1 Tab into vagina every Monday and Friday.  nystatin-triamcinolone (MYCOLOG II) topical cream as needed.  ALLERGIST TRAY 1CC 27GX1/2\" 1 mL 27 x 1/2\" syrg every seven (7) days. 99    Nebulizer Accessories kit Use as directed 1 Kit 3    cycloSPORINE (RESTASIS) 0.05 % ophthalmic emulsion Administer 1 Drop to both eyes two (2) times a day.  albuterol (PROAIR HFA) 90 mcg/actuation inhaler Take  by inhalation as needed for Wheezing.  valACYclovir (VALTREX) 1 gram tablet Take 1,000 mg by mouth daily.  mometasone-formoterol (DULERA) 100-5 mcg/actuation HFA inhaler Take 2 puffs by inhalation two (2) times a day.  mesalamine EC (ASACOL) 400 mg EC tablet Take 800 mg by mouth three (3) times daily.  hyoscyamine SL (LEVSIN/SL) 0.125 mg SL tablet 0.125 mg by SubLINGual route every four (4) hours.  CALCIUM CARBONATE (CALCIUM 300 PO) Take 600 mg by mouth daily.       pirbuterol (MAXAIR AUTOHALER) 200 mcg/Inhalation inhaler Take 2 Puffs by inhalation four (4) times daily as needed.  Omeprazole delayed release (PRILOSEC D/R) 20 mg tablet Take 40 mg by mouth daily.  estradiol (VAGIFEM) 25 mcg vaginal tablet Insert 25 mcg into vagina two (2) days a week.  olopatadine (PATANASE) 0.6 % Spry 2 Squirts by Both Nostrils route two (2) times a day.  biotin 2,500 mcg Tab Take 5,000 mcg by mouth daily.  mesalamine DR (ASACOL HD) 800 mg DR tablet two (2) times a day.  DUREZOL 0.05 % ophthalmic emulsion 1 Drop by Does Not Apply route every Monday and Friday. Allergies   Allergen Reactions    Latex Rash    Aldactone [Spironolactone] Hives    Chocolate [Cocoa] Rash    Cinnamon Rash    Hydrocodone Other (comments)     Vomiting, syncope    Lisinopril Hives       Objective:  Visit Vitals  /84 (BP 1 Location: Left arm, BP Patient Position: Sitting)   Pulse (!) 57   Temp 97.8 °F (36.6 °C) (Oral)   Resp 16   Ht 5' 2\" (1.575 m)   Wt 144 lb 12.8 oz (65.7 kg)   SpO2 99%   BMI 26.48 kg/m²     Physical Exam:   General appearance - alert, well appearing, and in no distress  Mental status - alert, oriented to person, place, and time  EYE-YUDELKA, EOMI, fundi normal, corneas normal, no foreign bodies  ENT-ENT exam normal, no neck nodes or sinus tenderness  Nose - normal and patent, no erythema, discharge or polyps  Mouth - mucous membranes moist, pharynx normal without lesions  Neck - supple, no significant adenopathy   Chest - clear to auscultation, no wheezes, rales or rhonchi, symmetric air entry   Heart -bradycardia, regular rhythm, normal S1, S2, no murmurs, rubs, clicks or gallops   Abdomen - soft, nontender, nondistended, no masses or organomegaly  Lymph- no adenopathy palpable  Ext-peripheral pulses normal, no pedal edema, no clubbing or cyanosis  Skin-Warm and dry.  no hyperpigmentation, vitiligo, or suspicious lesions  Neuro -alert, oriented, normal speech, no focal findings or movement disorder noted  Musculoskeletal- FROM, no bony abnormalities, no point tenderness    No results found for this visit on 03/20/20. All results for lab orders may not have been returned by the time this encountered was closed. Assessment/Plan:       ICD-10-CM ICD-9-CM    1. Essential hypertension I10 401.9    2. Crohn's disease of both small and large intestine without complication ([facility] U13.42 555.2    3. Moderate persistent asthma without complication R42.75 244.07    4. Mixed hyperlipidemia E78.2 272.2    5. Annual physical exam Z00.00 V70.0 CBC WITH AUTOMATED DIFF      LIPID PANEL      METABOLIC PANEL, COMPREHENSIVE      URINALYSIS W/O MICRO       Orders Placed This Encounter    CBC WITH AUTOMATED DIFF    LIPID PANEL ([facility])    METABOLIC PANEL, COMPREHENSIVE ([facility])    URINALYSIS W/O MICRO ([facility])    amLODIPine (NORVASC) 5 mg tablet     Sig: Take 1 Tab by mouth daily. Dispense:  90 Tab     Refill:  3    valsartan (DIOVAN) 160 mg tablet     Sig: Take 1 Tab by mouth daily. Dispense:  90 Tab     Refill:  3     To take place of olmesartan since this is not available or backordered. Plan:    Normal routine health maintenance exam.  Patient is up-to-date on most routine health maintenance. If her asthma or allergies progress she is to call for further instructions. She will continue her antihistamine and scheduled inhalers as prescribed. She is given refills on valsartan and amlodipine. She continues Bystolic as prescribed. I have reviewed with the patient details of the assessment and plan and all questions were answered. Relevent patient education was performed. Verbal and/or written instructions (see AVS) provided. The most recent lab findings were reviewed with the patient. Plan was discussed with patient who verbal expressed understanding. An After Visit Summary was printed and given to the patient.       Marzette Krabbe, MD

## 2020-03-21 LAB
BASOPHILS # BLD AUTO: 0 X10E3/UL (ref 0–0.2)
BASOPHILS NFR BLD AUTO: 1 %
EOSINOPHIL # BLD AUTO: 0.1 X10E3/UL (ref 0–0.4)
EOSINOPHIL NFR BLD AUTO: 1 %
ERYTHROCYTE [DISTWIDTH] IN BLOOD BY AUTOMATED COUNT: 15.2 % (ref 11.7–15.4)
HCT VFR BLD AUTO: 39.5 % (ref 34–46.6)
HGB BLD-MCNC: 12.3 G/DL (ref 11.1–15.9)
IMM GRANULOCYTES # BLD AUTO: 0 X10E3/UL (ref 0–0.1)
IMM GRANULOCYTES NFR BLD AUTO: 0 %
LYMPHOCYTES # BLD AUTO: 2.2 X10E3/UL (ref 0.7–3.1)
LYMPHOCYTES NFR BLD AUTO: 34 %
MCH RBC QN AUTO: 23.3 PG (ref 26.6–33)
MCHC RBC AUTO-ENTMCNC: 31.1 G/DL (ref 31.5–35.7)
MCV RBC AUTO: 75 FL (ref 79–97)
MONOCYTES # BLD AUTO: 0.6 X10E3/UL (ref 0.1–0.9)
MONOCYTES NFR BLD AUTO: 9 %
NEUTROPHILS # BLD AUTO: 3.6 X10E3/UL (ref 1.4–7)
NEUTROPHILS NFR BLD AUTO: 55 %
PLATELET # BLD AUTO: 286 X10E3/UL (ref 150–450)
RBC # BLD AUTO: 5.28 X10E6/UL (ref 3.77–5.28)
WBC # BLD AUTO: 6.5 X10E3/UL (ref 3.4–10.8)

## 2020-06-15 ENCOUNTER — HOSPITAL ENCOUNTER (OUTPATIENT)
Dept: MAMMOGRAPHY | Age: 75
Discharge: HOME | End: 2020-06-15
Attending: INTERNAL MEDICINE
Payer: COMMERCIAL

## 2020-06-15 DIAGNOSIS — Z12.31 VISIT FOR SCREENING MAMMOGRAM: ICD-10-CM

## 2020-06-15 PROCEDURE — 77063 BREAST TOMOSYNTHESIS BI: CPT

## 2020-09-21 ENCOUNTER — OFFICE VISIT (OUTPATIENT)
Dept: INTERNAL MEDICINE CLINIC | Age: 75
End: 2020-09-21
Payer: COMMERCIAL

## 2020-09-21 VITALS
RESPIRATION RATE: 16 BRPM | OXYGEN SATURATION: 99 % | TEMPERATURE: 97.8 F | DIASTOLIC BLOOD PRESSURE: 90 MMHG | HEIGHT: 62 IN | HEART RATE: 58 BPM | WEIGHT: 147 LBS | SYSTOLIC BLOOD PRESSURE: 148 MMHG | BODY MASS INDEX: 27.05 KG/M2

## 2020-09-21 DIAGNOSIS — M81.0 AGE-RELATED OSTEOPOROSIS WITHOUT CURRENT PATHOLOGICAL FRACTURE: ICD-10-CM

## 2020-09-21 DIAGNOSIS — K50.80 CROHN'S DISEASE OF BOTH SMALL AND LARGE INTESTINE WITHOUT COMPLICATION (HCC): ICD-10-CM

## 2020-09-21 DIAGNOSIS — I10 ESSENTIAL HYPERTENSION: Primary | ICD-10-CM

## 2020-09-21 DIAGNOSIS — J45.40 MODERATE PERSISTENT ASTHMA WITHOUT COMPLICATION: ICD-10-CM

## 2020-09-21 DIAGNOSIS — K51.20 ULCERATIVE PROCTITIS WITHOUT COMPLICATION (HCC): ICD-10-CM

## 2020-09-21 DIAGNOSIS — E78.2 MIXED HYPERLIPIDEMIA: ICD-10-CM

## 2020-09-21 LAB
A-G RATIO,AGRAT: 1.4 RATIO
ALBUMIN SERPL-MCNC: 4.6 G/DL (ref 3.9–5.4)
ALP SERPL-CCNC: 82 U/L (ref 38–126)
ALT SERPL-CCNC: 18 U/L (ref 0–35)
ANION GAP SERPL CALC-SCNC: 9 MMOL/L
AST SERPL W P-5'-P-CCNC: 22 U/L (ref 14–36)
BILIRUB SERPL-MCNC: 0.5 MG/DL (ref 0.2–1.3)
BILIRUB UR QL: NEGATIVE
BUN SERPL-MCNC: 15 MG/DL (ref 7–17)
BUN/CREATININE RATIO,BUCR: 17 RATIO
CALCIUM SERPL-MCNC: 9.7 MG/DL (ref 8.4–10.2)
CHLORIDE SERPL-SCNC: 107 MMOL/L (ref 98–107)
CHOL/HDL RATIO,CHHD: 2 RATIO (ref 0–4)
CHOLEST SERPL-MCNC: 252 MG/DL (ref 0–200)
CLARITY: CLEAR
CO2 SERPL-SCNC: 29 MMOL/L (ref 22–32)
COLOR UR: NORMAL
CREAT SERPL-MCNC: 0.9 MG/DL (ref 0.7–1.2)
ERYTHROCYTE [DISTWIDTH] IN BLOOD BY AUTOMATED COUNT: 15.3 %
GLOBULIN,GLOB: 3.3
GLUCOSE 24H UR-MRATE: NEGATIVE G/(24.H)
GLUCOSE SERPL-MCNC: 96 MG/DL (ref 65–105)
HCT VFR BLD AUTO: 40.5 % (ref 37–51)
HDLC SERPL-MCNC: 102 MG/DL (ref 35–130)
HGB BLD-MCNC: 12.7 G/DL (ref 12–18)
HGB UR QL STRIP: NEGATIVE
KETONES UR QL STRIP.AUTO: NEGATIVE
LDL/HDL RATIO,LDHD: 1 RATIO
LDLC SERPL CALC-MCNC: 128 MG/DL (ref 0–130)
LEUKOCYTE ESTERASE: NEGATIVE
LYMPHOCYTES ABSOLUTE: 2.5 K/UL (ref 0.6–4.1)
LYMPHOCYTES NFR BLD: 42.4 % (ref 10–58.5)
MCH RBC QN AUTO: 23 PG (ref 26–32)
MCHC RBC AUTO-ENTMCNC: 31.4 G/DL (ref 30–36)
MCV RBC AUTO: 73.2 FL (ref 80–97)
MONOCYTES ABS-DIF,2141: 0.5 K/UL (ref 0–1.8)
MONOCYTES NFR BLD: 7.5 % (ref 0.1–24)
NEUTROPHILS # BLD: 50.1 % (ref 37–92)
NEUTROPHILS ABS,2156: 3 K/UL (ref 2–7.8)
NITRITE UR QL STRIP.AUTO: NEGATIVE
PH UR STRIP: 6 [PH] (ref 5–7)
PLATELET # BLD AUTO: 263 K/UL (ref 140–440)
POTASSIUM SERPL-SCNC: 4 MMOL/L (ref 3.6–5)
PROT SERPL-MCNC: 7.9 G/DL (ref 6.3–8.2)
PROT UR STRIP-MCNC: NEGATIVE MG/DL
RBC # BLD AUTO: 5.53 M/UL (ref 4.2–6.3)
SODIUM SERPL-SCNC: 145 MMOL/L (ref 137–145)
SP GR UR REFRACTOMETRY: 1.01 (ref 1–1.03)
TRIGL SERPL-MCNC: 110 MG/DL (ref 0–200)
UROBILINOGEN UR QL STRIP.AUTO: NEGATIVE
VLDLC SERPL CALC-MCNC: 22 MG/DL
WBC # BLD AUTO: 6 K/UL (ref 4.1–10.9)

## 2020-09-21 PROCEDURE — G8536 NO DOC ELDER MAL SCRN: HCPCS | Performed by: INTERNAL MEDICINE

## 2020-09-21 PROCEDURE — 81003 URINALYSIS AUTO W/O SCOPE: CPT | Performed by: INTERNAL MEDICINE

## 2020-09-21 PROCEDURE — 80053 COMPREHEN METABOLIC PANEL: CPT | Performed by: INTERNAL MEDICINE

## 2020-09-21 PROCEDURE — G8755 DIAS BP > OR = 90: HCPCS | Performed by: INTERNAL MEDICINE

## 2020-09-21 PROCEDURE — G8427 DOCREV CUR MEDS BY ELIG CLIN: HCPCS | Performed by: INTERNAL MEDICINE

## 2020-09-21 PROCEDURE — 80061 LIPID PANEL: CPT | Performed by: INTERNAL MEDICINE

## 2020-09-21 PROCEDURE — G8753 SYS BP > OR = 140: HCPCS | Performed by: INTERNAL MEDICINE

## 2020-09-21 PROCEDURE — 99214 OFFICE O/P EST MOD 30 MIN: CPT | Performed by: INTERNAL MEDICINE

## 2020-09-21 PROCEDURE — 1090F PRES/ABSN URINE INCON ASSESS: CPT | Performed by: INTERNAL MEDICINE

## 2020-09-21 PROCEDURE — G8419 CALC BMI OUT NRM PARAM NOF/U: HCPCS | Performed by: INTERNAL MEDICINE

## 2020-09-21 PROCEDURE — 1101F PT FALLS ASSESS-DOCD LE1/YR: CPT | Performed by: INTERNAL MEDICINE

## 2020-09-21 PROCEDURE — G9899 SCRN MAM PERF RSLTS DOC: HCPCS | Performed by: INTERNAL MEDICINE

## 2020-09-21 PROCEDURE — G8432 DEP SCR NOT DOC, RNG: HCPCS | Performed by: INTERNAL MEDICINE

## 2020-09-21 PROCEDURE — 3017F COLORECTAL CA SCREEN DOC REV: CPT | Performed by: INTERNAL MEDICINE

## 2020-09-21 PROCEDURE — 85025 COMPLETE CBC W/AUTO DIFF WBC: CPT | Performed by: INTERNAL MEDICINE

## 2020-09-21 RX ORDER — EPINEPHRINE 0.3 MG/.3ML
INJECTION SUBCUTANEOUS
COMMUNITY
Start: 2020-08-08

## 2020-09-21 NOTE — PROGRESS NOTES
Alondra Sanon presents today at the clinic for    Chief Complaint   Patient presents with    Hypertension     follow up    Asthma     follow up    Crohn's Disease     follow up    Cholesterol Problem     follow up        Wt Readings from Last 3 Encounters:   09/21/20 147 lb (66.7 kg)   03/20/20 144 lb 12.8 oz (65.7 kg)   12/10/19 143 lb 11.8 oz (65.2 kg)     Temp Readings from Last 3 Encounters:   09/21/20 97.8 °F (36.6 °C) (Oral)   03/20/20 97.8 °F (36.6 °C) (Oral)   12/10/19 98 °F (36.7 °C)     BP Readings from Last 3 Encounters:   09/21/20 (!) 150/92   03/20/20 148/84   12/10/19 180/72     Pulse Readings from Last 3 Encounters:   09/21/20 (!) 58   03/20/20 (!) 57   12/10/19 70       Health Maintenance Due   Topic    DTaP/Tdap/Td series (1 - Tdap)    Shingrix Vaccine Age 50> (1 of 2)    GLAUCOMA SCREENING Q2Y     Pneumococcal 65+ years (2 of 2 - PPSV23)    Flu Vaccine (1)         Learning Assessment:  :     Learning Assessment 7/13/2017   PRIMARY LEARNER Patient   BARRIERS PRIMARY LEARNER NONE   CO-LEARNER CAREGIVER No   PRIMARY LANGUAGE ENGLISH   LEARNER PREFERENCE PRIMARY READING   ANSWERED BY Patient   RELATIONSHIP SELF       Depression Screening:  :     3 most recent PHQ Screens 3/20/2020   Little interest or pleasure in doing things Not at all   Feeling down, depressed, irritable, or hopeless Not at all   Total Score PHQ 2 0       Fall Risk Assessment:  :     Fall Risk Assessment, last 12 mths 3/20/2020   Able to walk? Yes   Fall in past 12 months? No       Abuse Screening:  :     Abuse Screening Questionnaire 3/20/2020 8/21/2018 7/13/2017   Do you ever feel afraid of your partner? N N N   Are you in a relationship with someone who physically or mentally threatens you? N N N   Is it safe for you to go home? Lay Kim       Coordination of Care Questionnaire:  :     1. Have you been to the ER, urgent care clinic since your last visit? Hospitalized since your last visit? no    2.  Have you seen or consulted any other health care providers outside of the [address] [place] [address] since your last visit? Include any pap smears or colon screening. Had a normal mammogram - 6/15/20. Saw Dr Lino Salas on 7/22/20 and 9/2/20 for DDD.

## 2020-09-21 NOTE — PROGRESS NOTES
This note will not be viewable in [address] Phan Watts is a 77 y.o. female and presents with Hypertension (follow up); Asthma (follow up); Crohn's Disease (follow up); and Cholesterol Problem (follow up)  . Subjective:  Mrs. Brayan Merrill presents today for follow-up of hypertension, asthma, Crohn's disease, hyperlipidemia, allergic rhinitis. She does see an allergist and has received allergy shots. She remains on medication for seasonal allergies. Her blood pressure has been controlled on her current regimen although she did not take her blood pressure medicine this morning and her blood pressure is borderline elevated. She denies any shortness of breath, chest pain, palpitations, PND, orthopnea, or pedal edema. She has an occasional sharp pain in her face or cheek area that occurs intermittently. It is not always located in the same location. She has not seen her dentist in over a year. She has intermittent sinus drainage especially with change in the season. She sees her gynecologist later this year and is due for follow-up bone density test.         Review of Systems  Constitutional:   Eyes:   negative for visual disturbance and irritation  ENT:   negative for tinnitus,sore throat,nasal congestion,ear pains. hoarseness  Respiratory:  negative for cough, hemoptysis, dyspnea,wheezing  CV:   negative for chest pain, palpitations, lower extremity edema  GI:   negative for nausea, vomiting, diarrhea, abdominal pain,melena  Endo:               negative for polyuria,polydipsia,polyphagia,heat intolerance  Genitourinary: negative for frequency, dysuria and hematuria  Integumentary: negative for rash and pruritus  Hematologic:  negative for easy bruising and gum/nose bleeding  Musculoskel: negative for myalgias, arthralgias, back pain, muscle weakness, joint pain  Neurological:  negative for headaches, dizziness, vertigo, memory problems and gait   Behavl/Psych: negative for feelings of anxiety, depression, mood changes    Past Medical History:   Diagnosis Date    Allergic rhinitis 6/28/2017    Arthritis     Asthma     Asthma 6/28/2017    Impressio: continue nebs and Dulera, prednisone taper    Bradycardia 6/28/2017    Impression: asymptomatic, continue Bystolic    Cataract, bilateral 6/28/2017    Impression: low risk for elective surgical procedure, proceed without further risk stratification, EKG shows sinus domingo without acute ST-T changes which is her baseline    Crohn's disease ([address]     Crohn's disease ([address] 6/28/2017    Impression: GI following    Degenerative arthritis 6/28/2017    Impression: trial of topical NSAID, if persists can consider an injection    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, 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    Fatigue 6/28/2017    Flank pain 6/28/2017    Impression: most likely secondary to Crohn's, not cardiac by history, EKG normal Status is Inactive      Gastrointestinal disorder     crones    Hair loss 6/28/2017    Impression: refer to derm    Hematochezia 6/28/2017    Status is Inactive    Herpes Dx 2015    no outbreak as of 5/2/16    Hyperlipidemia 6/28/2017    Hypertension     Hypokalemia 6/28/2017    MRSA (methicillin resistant staph aureus) culture positive \"many years ago\" as of 5/2/16    pt states + nasal swab \"many years ago\", Tx and no + since; UNCONFIRMED    Neck pain 6/28/2017    Impresssion: finish PT, continue HEP, pain med prn    Osteoporosis 6/28/2017    Otitis media, acute 6/28/2017    Impression: follow up next week, finish abx Status is Inactive    Pneumonia 6/28/2017    Impression:  Completed course of antibiotics, clinically resolved Status is Inactive    Traumatic hematoma of scalp 6/28/2017    Status is Inactive    Ulcerative colitis ([address] 6/28/2017    Vaginal yeast infection 6/28/2017    Impression, consider probiotic, check blood sugar, follow up GYN Status is Inactive     Past Surgical History:   Procedure Laterality Date  HX CATARACT REMOVAL Right 3/28/16    HX MOHS PROCEDURES Right     HX ORTHOPAEDIC Left     left knee sx    HX ORTHOPAEDIC Right     trigger release      HX TUBAL LIGATION       Social History     Socioeconomic History    Marital status:      Spouse name: Not on file    Number of children: Not on file    Years of education: Not on file    Highest education level: Not on file   Tobacco Use    Smoking status: Never Smoker    Smokeless tobacco: Never Used   Substance and Sexual Activity    Alcohol use: No    Drug use: No    Sexual activity: Never     Family History   Problem Relation Age of Onset    Breast Cancer Maternal Aunt         ?  Heart Disease Mother     Asthma Father     Stroke Father      Current Outpatient Medications   Medication Sig Dispense Refill    loratadine 10 mg cap Take  by mouth daily.  EPINEPHrine (EPIPEN) 0.3 mg/0.3 mL injection INJECT CONTENTS OF 1 PEN AS NEEDED FOR ALLERGIC REACTION AS DIRECTED      amLODIPine (NORVASC) 5 mg tablet Take 1 Tab by mouth daily. 90 Tab 3    valsartan (DIOVAN) 160 mg tablet Take 1 Tab by mouth daily. 90 Tab 3    BYSTOLIC 10 mg tablet TAKE 1 TABLET DAILY 90 Tab 3    YUVAFEM 10 mcg tab vaginal tablet Insert 1 Tab into vagina every Monday and Friday.  nystatin-triamcinolone (MYCOLOG II) topical cream as needed.  ALLERGIST TRAY 1CC 27GX1/2\" 1 mL 27 x 1/2\" syrg every seven (7) days. 99    Nebulizer Accessories kit Use as directed 1 Kit 3    cycloSPORINE (RESTASIS) 0.05 % ophthalmic emulsion Administer 1 Drop to both eyes two (2) times a day.  albuterol (PROAIR HFA) 90 mcg/actuation inhaler Take  by inhalation as needed for Wheezing.  valACYclovir (VALTREX) 1 gram tablet Take 1,000 mg by mouth daily.  mometasone-formoterol (DULERA) 100-5 mcg/actuation HFA inhaler Take 2 puffs by inhalation two (2) times a day.  mesalamine EC (ASACOL) 400 mg EC tablet Take 800 mg by mouth three (3) times daily.       hyoscyamine SL (LEVSIN/SL) 0.125 mg SL tablet 0.125 mg by SubLINGual route every four (4) hours.  CALCIUM CARBONATE (CALCIUM 300 PO) Take 600 mg by mouth daily.  Omeprazole delayed release (PRILOSEC D/R) 20 mg tablet Take 40 mg by mouth daily.  estradiol (VAGIFEM) 25 mcg vaginal tablet Insert 25 mcg into vagina two (2) days a week.  olopatadine (PATANASE) 0.6 % Spry 2 Squirts by Both Nostrils route two (2) times a day.  biotin 2,500 mcg Tab Take 5,000 mcg by mouth daily.  pirbuterol (MAXAIR AUTOHALER) 200 mcg/Inhalation inhaler Take 2 Puffs by inhalation four (4) times daily as needed. Allergies   Allergen Reactions    Latex Rash    Aldactone [Spironolactone] Hives    Chocolate [Cocoa] Rash    Cinnamon Rash    Hydrocodone Other (comments)     Vomiting, syncope    Lisinopril Hives       Objective:  Visit Vitals  BP (!) 148/90 (BP 1 Location: Left arm, BP Patient Position: Sitting)   Pulse (!) 58   Temp 97.8 °F (36.6 °C) (Oral)   Resp 16   Ht 5' 2\" (1.575 m)   Wt 147 lb (66.7 kg)   SpO2 99%   BMI 26.89 kg/m²     Physical Exam:   General appearance - alert, well appearing, and in no distress  Mental status - alert, oriented to person, place, and time  EYE-YUDELKA, EOMI, fundi normal, corneas normal, no foreign bodies  ENT-ENT exam normal, no neck nodes or sinus tenderness  Nose - normal and patent, no erythema, discharge or polyps  Mouth - mucous membranes moist, pharynx normal without lesions  Neck - supple, no significant adenopathy   Chest - clear to auscultation, no wheezes, rales or rhonchi, symmetric air entry   Heart - normal rate, regular rhythm, normal S1, S2, no murmurs, rubs, clicks or gallops   Abdomen - soft, nontender, nondistended, no masses or organomegaly  Lymph- no adenopathy palpable  Ext-peripheral pulses normal, no pedal edema, no clubbing or cyanosis  Skin-Warm and dry.  no hyperpigmentation, vitiligo, or suspicious lesions  Neuro -alert, oriented, normal speech, no focal findings or movement disorder noted  Musculoskeletal- FROM, no bony abnormalities, no point tenderness    No results found for this visit on 09/21/20. All results for lab orders may not have been returned by the time this encountered was closed. Assessment/Plan:       ICD-10-CM ICD-9-CM    1. Essential hypertension  I10 401.9 LIPID PANEL      METABOLIC PANEL, COMPREHENSIVE      URINALYSIS W/O MICRO   2. Ulcerative proctitis without complication (HCC)  J80.91 556.2 CBC WITH AUTOMATED DIFF   3. Crohn's disease of both small and large intestine without complication (HCC)  Z18.97 555.2    4. Moderate persistent asthma without complication  J58.18 637.13    5. Age-related osteoporosis without current pathological fracture  M81.0 733.01    6. Mixed hyperlipidemia  E78.2 272.2 LIPID PANEL       Orders Placed This Encounter    LIPID PANEL ([facility])    METABOLIC PANEL, COMPREHENSIVE ([facility])    URINALYSIS W/O MICRO ([employer] [facility])    CBC WITH AUTOMATED DIFF ([facility])    loratadine 10 mg cap     Sig: Take  by mouth daily.  EPINEPHrine (EPIPEN) 0.3 mg/0.3 mL injection     Sig: INJECT CONTENTS OF 1 PEN AS NEEDED FOR ALLERGIC REACTION AS DIRECTED     Plan:    Continue current medical regimen as outlined above. Follow-up bone density with gynecologist later this fall. Blood pressure is borderline elevated today however the patient has not taken her medication. She will monitor this at home and notify if her blood pressure medication does not appear to be effective. We will reevaluate on follow-up in 6 months. Further recommendations based on labs as ordered. I have reviewed with the patient details of the assessment and plan and all questions were answered. Relevent patient education was performed. Verbal and/or written instructions (see AVS) provided. The most recent lab findings were reviewed with the patient.   Plan was discussed with patient who verbal expressed understanding. An After Visit Summary was printed and given to the patient.       Eulalio Jordan MD

## 2020-11-27 NOTE — PROGRESS NOTES
Total cholesterol was elevated however HDL and LDL cholesterol are excellent. Glucose is normal.  Liver and kidney function are normal.  Your complete blood count is stable.

## 2021-01-15 RX ORDER — NEBIVOLOL HYDROCHLORIDE 10 MG/1
TABLET ORAL
Qty: 90 TAB | Refills: 3 | Status: SHIPPED | OUTPATIENT
Start: 2021-01-15 | End: 2021-02-03 | Stop reason: SDUPTHER

## 2021-01-15 NOTE — TELEPHONE ENCOUNTER
Darleen Sera is a 77 y.o. female  Requested Prescriptions     Pending Prescriptions Disp Refills    Bystolic 10 mg tablet 90 Tab 3

## 2021-02-03 RX ORDER — NEBIVOLOL HYDROCHLORIDE 10 MG/1
TABLET ORAL
Qty: 90 TAB | Refills: 3 | Status: SHIPPED | OUTPATIENT
Start: 2021-02-03 | End: 2021-04-12

## 2021-02-03 NOTE — TELEPHONE ENCOUNTER
Last Refill:   Last Visit: 9/21/2020   Next Visit: 4/2/2021    Requested Prescriptions     Pending Prescriptions Disp Refills    Bystolic 10 mg tablet 90 Tab 3     Sig: TAKE 1 TABLET DAILY     Medication was sent to [facility] 1/15/21  Called & cancelled prescription 2/3/21  Patient stated cost to much.

## 2021-03-16 ENCOUNTER — TELEPHONE (OUTPATIENT)
Dept: INTERNAL MEDICINE CLINIC | Age: 76
End: 2021-03-16

## 2021-03-16 ENCOUNTER — OFFICE VISIT (OUTPATIENT)
Dept: INTERNAL MEDICINE CLINIC | Age: 76
End: 2021-03-16
Payer: COMMERCIAL

## 2021-03-16 VITALS
BODY MASS INDEX: 26.54 KG/M2 | OXYGEN SATURATION: 97 % | HEART RATE: 50 BPM | HEIGHT: 62 IN | SYSTOLIC BLOOD PRESSURE: 148 MMHG | TEMPERATURE: 97.8 F | RESPIRATION RATE: 16 BRPM | WEIGHT: 144.2 LBS | DIASTOLIC BLOOD PRESSURE: 90 MMHG

## 2021-03-16 DIAGNOSIS — I10 ESSENTIAL HYPERTENSION: Primary | ICD-10-CM

## 2021-03-16 PROCEDURE — G8510 SCR DEP NEG, NO PLAN REQD: HCPCS | Performed by: INTERNAL MEDICINE

## 2021-03-16 PROCEDURE — G8536 NO DOC ELDER MAL SCRN: HCPCS | Performed by: INTERNAL MEDICINE

## 2021-03-16 PROCEDURE — 1101F PT FALLS ASSESS-DOCD LE1/YR: CPT | Performed by: INTERNAL MEDICINE

## 2021-03-16 PROCEDURE — 99213 OFFICE O/P EST LOW 20 MIN: CPT | Performed by: INTERNAL MEDICINE

## 2021-03-16 PROCEDURE — G8427 DOCREV CUR MEDS BY ELIG CLIN: HCPCS | Performed by: INTERNAL MEDICINE

## 2021-03-16 PROCEDURE — G8753 SYS BP > OR = 140: HCPCS | Performed by: INTERNAL MEDICINE

## 2021-03-16 PROCEDURE — G8755 DIAS BP > OR = 90: HCPCS | Performed by: INTERNAL MEDICINE

## 2021-03-16 PROCEDURE — 1090F PRES/ABSN URINE INCON ASSESS: CPT | Performed by: INTERNAL MEDICINE

## 2021-03-16 PROCEDURE — G8419 CALC BMI OUT NRM PARAM NOF/U: HCPCS | Performed by: INTERNAL MEDICINE

## 2021-03-16 PROCEDURE — G9899 SCRN MAM PERF RSLTS DOC: HCPCS | Performed by: INTERNAL MEDICINE

## 2021-03-16 PROCEDURE — 3017F COLORECTAL CA SCREEN DOC REV: CPT | Performed by: INTERNAL MEDICINE

## 2021-03-16 RX ORDER — METHYLPREDNISOLONE 4 MG/1
4 TABLET ORAL
COMMUNITY
End: 2021-05-12 | Stop reason: ALTCHOICE

## 2021-03-16 RX ORDER — VALSARTAN 320 MG/1
320 TABLET ORAL DAILY
Qty: 30 TAB | Refills: 0 | Status: SHIPPED | OUTPATIENT
Start: 2021-03-16 | End: 2021-04-02

## 2021-03-16 RX ORDER — AMOXICILLIN AND CLAVULANATE POTASSIUM 875; 125 MG/1; MG/1
1 TABLET, FILM COATED ORAL EVERY 12 HOURS
COMMUNITY
End: 2021-04-02 | Stop reason: ALTCHOICE

## 2021-03-16 NOTE — PROGRESS NOTES
HIPAA verified by two patient identifiers. Rubin Munroe is a 77 y.o. female    Chief Complaint   Patient presents with    Hypertension     bp high       Visit Vitals  Pulse (!) 50   Temp 97.8 °F (36.6 °C) (Oral)   Resp 16   Ht 5' 2\" (1.575 m)   Wt 144 lb 3.2 oz (65.4 kg)   SpO2 97%   BMI 26.37 kg/m²       Pain Scale: 0 - No pain/10  Pain Location:       Health Maintenance Due   Topic Date Due    COVID-19 Vaccine (1) Never done    DTaP/Tdap/Td series (1 - Tdap) Never done    Shingrix Vaccine Age 50> (1 of 2) Never done    GLAUCOMA SCREENING Q2Y  Never done    Pneumococcal 65+ years (2 of 2 - PPSV23) 08/21/2019    Flu Vaccine (1) Never done    Medicare Yearly Exam  03/16/2021         Coordination of Care Questionnaire:  :   1) Have you been to an emergency room, urgent care, or hospitalized since your last visit? If yes, where when, and reason for visit? Yes    Saturday  For sinus      2. Have seen or consulted any other health care provider since your last visit? If yes, where when, and reason for visit? NO      Patient is accompanied by self I have received verbal consent from Rubin Munroe to discuss any/all medical information while they are present in the room.

## 2021-03-16 NOTE — PROGRESS NOTES
This note will not be viewable in [address] Kimmy Mckeon is a 77 y.o. female and presents with Hypertension (bp high)  . Subjective:  Mrs. Tobi Holliday presents today for follow-up of hypertension. She notes that her blood pressure was extremely high yesterday so she presents today for evaluation. She has had a recent upper respiratory infection and is being treated with prednisone and an antibiotic. She has some sinus pressure congestion and drainage. She does not have any cough. She does have some reflux and has noticed some difficulty with swallowing at times. She has a follow-up with her gastroenterologist in the next couple of months and will address this with them. Most of her systolic blood pressures with her automatic cuff at home are in the 200 range yesterday. She called 911 because she was scared of having a stroke and EMS confirmed that her blood pressure was elevated as well. She took her blood pressure medication early this morning after awakening at 4:30 AM and checking her blood pressure. She currently denies chest pain shortness of breath PND orthopnea or pedal edema. She does have a slight headache.     Past Medical History:   Diagnosis Date    Allergic rhinitis 6/28/2017    Arthritis     Asthma     Asthma 6/28/2017    Impressio: continue nebs and Dulera, prednisone taper    Bradycardia 6/28/2017    Impression: asymptomatic, continue Bystolic    Cataract, bilateral 6/28/2017    Impression: low risk for elective surgical procedure, proceed without further risk stratification, EKG shows sinus domingo without acute ST-T changes which is her baseline    Crohn's disease ([address]     Crohn's disease ([address] 6/28/2017    Impression: GI following    Degenerative arthritis 6/28/2017    Impression: trial of topical NSAID, if persists can consider an injection    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, 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    Fatigue 6/28/2017    Flank pain 6/28/2017 Impression: most likely secondary to Crohn's, not cardiac by history, EKG normal Status is Inactive      Gastrointestinal disorder     crones    Hair loss 6/28/2017    Impression: refer to derm    Hematochezia 6/28/2017    Status is Inactive    Herpes Dx 2015    no outbreak as of 5/2/16    Hyperlipidemia 6/28/2017    Hypertension     Hypokalemia 6/28/2017    MRSA (methicillin resistant staph aureus) culture positive \"many years ago\" as of 5/2/16    pt states + nasal swab \"many years ago\", Tx and no + since; UNCONFIRMED    Neck pain 6/28/2017    Impresssion: finish PT, continue HEP, pain med prn    Osteoporosis 6/28/2017    Otitis media, acute 6/28/2017    Impression: follow up next week, finish abx Status is Inactive    Pneumonia 6/28/2017    Impression:  Completed course of antibiotics, clinically resolved Status is Inactive    Traumatic hematoma of scalp 6/28/2017    Status is Inactive    Ulcerative colitis ([address] 6/28/2017    Vaginal yeast infection 6/28/2017    Impression, consider probiotic, check blood sugar, follow up GYN Status is Inactive     Past Surgical History:   Procedure Laterality Date    HX CATARACT REMOVAL Right 3/28/16    HX MOHS PROCEDURES Right     HX ORTHOPAEDIC Left     left knee sx    HX ORTHOPAEDIC Right     trigger release      HX TUBAL LIGATION       Allergies   Allergen Reactions    Latex Rash    Aldactone [Spironolactone] Hives    Chocolate [Cocoa] Rash    Cinnamon Rash    Hydrocodone Other (comments)     Vomiting, syncope    Lisinopril Hives     Current Outpatient Medications   Medication Sig Dispense Refill    amoxicillin-clavulanate (AUGMENTIN) 875-125 mg per tablet Take 1 Tab by mouth every twelve (12) hours.  methylPREDNISolone (MEDROL DOSEPACK) 4 mg tablet Take 4 mg by mouth.  Bystolic 10 mg tablet TAKE 1 TABLET DAILY 90 Tab 3    loratadine 10 mg cap Take  by mouth daily.       EPINEPHrine (EPIPEN) 0.3 mg/0.3 mL injection INJECT CONTENTS OF 1 PEN AS NEEDED FOR ALLERGIC REACTION AS DIRECTED      amLODIPine (NORVASC) 5 mg tablet Take 1 Tab by mouth daily. 90 Tab 3    valsartan (DIOVAN) 160 mg tablet Take 1 Tab by mouth daily. 90 Tab 3    YUVAFEM 10 mcg tab vaginal tablet Insert 1 Tab into vagina every Monday and Friday.  nystatin-triamcinolone (MYCOLOG II) topical cream as needed.  ALLERGIST TRAY 1CC 27GX1/2\" 1 mL 27 x 1/2\" syrg every seven (7) days. 99    Nebulizer Accessories kit Use as directed 1 Kit 3    cycloSPORINE (RESTASIS) 0.05 % ophthalmic emulsion Administer 1 Drop to both eyes two (2) times a day.  albuterol (PROAIR HFA) 90 mcg/actuation inhaler Take  by inhalation as needed for Wheezing.  valACYclovir (VALTREX) 1 gram tablet Take 1,000 mg by mouth daily.  mometasone-formoterol (DULERA) 100-5 mcg/actuation HFA inhaler Take 2 puffs by inhalation two (2) times a day.  mesalamine EC (ASACOL) 400 mg EC tablet Take 800 mg by mouth three (3) times daily.  hyoscyamine SL (LEVSIN/SL) 0.125 mg SL tablet 0.125 mg by SubLINGual route every four (4) hours.  CALCIUM CARBONATE (CALCIUM 300 PO) Take 600 mg by mouth daily.  pirbuterol (MAXAIR AUTOHALER) 200 mcg/Inhalation inhaler Take 2 Puffs by inhalation four (4) times daily as needed.  Omeprazole delayed release (PRILOSEC D/R) 20 mg tablet Take 40 mg by mouth daily.  estradiol (VAGIFEM) 25 mcg vaginal tablet Insert 25 mcg into vagina two (2) days a week.  olopatadine (PATANASE) 0.6 % Spry 2 Squirts by Both Nostrils route two (2) times a day.  biotin 2,500 mcg Tab Take 5,000 mcg by mouth daily.          Social History     Socioeconomic History    Marital status:      Spouse name: Not on file    Number of children: Not on file    Years of education: Not on file    Highest education level: Not on file   Tobacco Use    Smoking status: Never Smoker    Smokeless tobacco: Never Used   Substance and Sexual Activity    Alcohol use: No    Drug use: No    Sexual activity: Never     Family History   Problem Relation Age of Onset    Breast Cancer Maternal Aunt         ?  Heart Disease Mother     Asthma Father     Stroke Father        Review of Systems  Constitutional:  negative for fevers, chills, anorexia and weight loss  Eyes:    negative for visual disturbance and irritation  ENT:    negative for tinnitus,sore throat,nasal congestion,ear pains. hoarseness  Respiratory:     negative for cough, hemoptysis, dyspnea,wheezing  CV:    negative for chest pain, palpitations, lower extremity edema  GI:    negative for nausea, vomiting, diarrhea, abdominal pain,melena  Endo:               negative for polyuria,polydipsia,polyphagia,heat intolerance  Genitourinary : negative for frequency, dysuria and hematuria  Integumentary: negative for rash and pruritus  Hematologic:   negative for easy bruising and gum/nose bleeding  Musculoskel:  negative for myalgias, arthralgias, back pain, muscle weakness, joint pain  Neurological:   negative for headaches, dizziness, vertigo, memory problems and gait   Behavl/Psych:  negative for feelings of anxiety, depression, mood changes  ROS otherwise negative      Objective:  Visit Vitals  BP (!) 148/90 (BP 1 Location: Left upper arm, BP Patient Position: Sitting, BP Cuff Size: Large adult)   Pulse (!) 50   Temp 97.8 °F (36.6 °C) (Oral)   Resp 16   Ht 5' 2\" (1.575 m)   Wt 144 lb 3.2 oz (65.4 kg)   SpO2 97%   BMI 26.37 kg/m²     Physical Exam:   General appearance - alert, well appearing, and in no distress  Mental status - alert, oriented to person, place, and time  EYE-[place], EOMI, fundi normal, corneas normal, no foreign bodies  ENT-ENT exam normal, no neck nodes or sinus tenderness  Nose - normal and patent, no erythema, discharge or polyps  Mouth - mucous membranes moist, pharynx normal without lesions  Neck - supple, no significant adenopathy   Chest - clear to auscultation, no wheezes, rales or rhonchi, symmetric air entry   Heart - normal rate, regular rhythm, normal S1, S2, no murmurs, rubs, clicks or gallops   Abdomen - soft, nontender, nondistended, no masses or organomegaly  Lymph- no adenopathy palpable  Ext-peripheral pulses normal, no pedal edema, no clubbing or cyanosis  Skin-Warm and dry. no hyperpigmentation, vitiligo, or suspicious lesions  Neuro -alert, oriented, normal speech, no focal findings or movement disorder noted      Assessment/Plan:  Diagnoses and all orders for this visit:    1. Essential hypertension          ICD-10-CM ICD-9-CM    1. Essential hypertension  I10 401.9        Plan:    Hypertension may be exacerbated by prednisone or other. Will increase valsartan to 320 mg daily. Continue Bystolic 10 mg daily and amlodipine 5 mg daily. She will continue to monitor blood pressure at home and follow-up in 2 weeks as scheduled. Her valsartan will be sent to her local [facility] and if this is effective we will update a 90-day prescription to her mail order pharmacy. I have reviewed with the patient details of the assessment and plan and all questions were answered. Relevent patient education was performed. Verbal and/or written instructions (see AVS) provided. The most recent lab findings were reviewed with the patient. Plan was discussed with patient who verbally expressed understanding. An After Visit Summary was printed and given to the patient.     Debbie Brizuela MD

## 2021-03-16 NOTE — TELEPHONE ENCOUNTER
Patient calling to let Dr. Dominguez Neighbours know she took the medication valsartan 160 mg this morning 4:35am and the another 160 mg at 1:30 pm.    BP was 205/67 at 3:04pm  BP was 208/71 at 4:00pm    Please advise.

## 2021-03-17 ENCOUNTER — HOSPITAL ENCOUNTER (EMERGENCY)
Age: 76
Discharge: HOME | End: 2021-03-17
Attending: EMERGENCY MEDICINE
Payer: COMMERCIAL

## 2021-03-17 VITALS
SYSTOLIC BLOOD PRESSURE: 188 MMHG | RESPIRATION RATE: 16 BRPM | TEMPERATURE: 97.7 F | WEIGHT: 143.96 LBS | OXYGEN SATURATION: 99 % | HEART RATE: 45 BPM | DIASTOLIC BLOOD PRESSURE: 76 MMHG | HEIGHT: 62 IN | BODY MASS INDEX: 26.49 KG/M2

## 2021-03-17 DIAGNOSIS — R55 NEAR SYNCOPE: ICD-10-CM

## 2021-03-17 DIAGNOSIS — I10 ESSENTIAL HYPERTENSION: Primary | ICD-10-CM

## 2021-03-17 LAB
ALBUMIN SERPL-MCNC: 3.7 G/DL (ref 3.5–5)
ALBUMIN/GLOB SERPL: 0.9 {RATIO} (ref 1.1–2.2)
ALP SERPL-CCNC: 75 U/L (ref 45–117)
ALT SERPL-CCNC: 45 U/L (ref 12–78)
ANION GAP SERPL CALC-SCNC: 6 MMOL/L (ref 5–15)
AST SERPL-CCNC: 24 U/L (ref 15–37)
ATRIAL RATE: 49 BPM
BASOPHILS # BLD: 0 K/UL (ref 0–0.1)
BASOPHILS NFR BLD: 0 % (ref 0–1)
BILIRUB SERPL-MCNC: 0.3 MG/DL (ref 0.2–1)
BUN SERPL-MCNC: 22 MG/DL (ref 6–20)
BUN/CREAT SERPL: 22 (ref 12–20)
CALCIUM SERPL-MCNC: 8.6 MG/DL (ref 8.5–10.1)
CALCULATED P AXIS, ECG09: 51 DEGREES
CALCULATED R AXIS, ECG10: 30 DEGREES
CALCULATED T AXIS, ECG11: 78 DEGREES
CHLORIDE SERPL-SCNC: 106 MMOL/L (ref 97–108)
CO2 SERPL-SCNC: 27 MMOL/L (ref 21–32)
CREAT SERPL-MCNC: 0.99 MG/DL (ref 0.55–1.02)
DIAGNOSIS, 93000: NORMAL
DIFFERENTIAL METHOD BLD: ABNORMAL
EOSINOPHIL # BLD: 0.1 K/UL (ref 0–0.4)
EOSINOPHIL NFR BLD: 1 % (ref 0–7)
ERYTHROCYTE [DISTWIDTH] IN BLOOD BY AUTOMATED COUNT: 17.8 % (ref 11.5–14.5)
GLOBULIN SER CALC-MCNC: 3.9 G/DL (ref 2–4)
GLUCOSE SERPL-MCNC: 123 MG/DL (ref 65–100)
HCT VFR BLD AUTO: 42.5 % (ref 35–47)
HGB BLD-MCNC: 13 G/DL (ref 11.5–16)
IMM GRANULOCYTES # BLD AUTO: 0.1 K/UL (ref 0–0.04)
IMM GRANULOCYTES NFR BLD AUTO: 1 % (ref 0–0.5)
LYMPHOCYTES # BLD: 3.2 K/UL (ref 0.8–3.5)
LYMPHOCYTES NFR BLD: 27 % (ref 12–49)
MCH RBC QN AUTO: 22.6 PG (ref 26–34)
MCHC RBC AUTO-ENTMCNC: 30.6 G/DL (ref 30–36.5)
MCV RBC AUTO: 73.9 FL (ref 80–99)
MONOCYTES # BLD: 0.8 K/UL (ref 0–1)
MONOCYTES NFR BLD: 7 % (ref 5–13)
NEUTS SEG # BLD: 7.7 K/UL (ref 1.8–8)
NEUTS SEG NFR BLD: 65 % (ref 32–75)
NRBC # BLD: 0 K/UL (ref 0–0.01)
NRBC BLD-RTO: 0 PER 100 WBC
P-R INTERVAL, ECG05: 158 MS
PLATELET # BLD AUTO: 277 K/UL (ref 150–400)
PMV BLD AUTO: ABNORMAL FL (ref 8.9–12.9)
POTASSIUM SERPL-SCNC: 3.6 MMOL/L (ref 3.5–5.1)
PROT SERPL-MCNC: 7.6 G/DL (ref 6.4–8.2)
Q-T INTERVAL, ECG07: 462 MS
QRS DURATION, ECG06: 90 MS
QTC CALCULATION (BEZET), ECG08: 417 MS
RBC # BLD AUTO: 5.75 M/UL (ref 3.8–5.2)
SODIUM SERPL-SCNC: 139 MMOL/L (ref 136–145)
TROPONIN I SERPL-MCNC: <0.05 NG/ML
VENTRICULAR RATE, ECG03: 49 BPM
WBC # BLD AUTO: 11.9 K/UL (ref 3.6–11)

## 2021-03-17 PROCEDURE — 99284 EMERGENCY DEPT VISIT MOD MDM: CPT

## 2021-03-17 PROCEDURE — 93005 ELECTROCARDIOGRAM TRACING: CPT

## 2021-03-17 PROCEDURE — 80053 COMPREHEN METABOLIC PANEL: CPT

## 2021-03-17 PROCEDURE — 85025 COMPLETE CBC W/AUTO DIFF WBC: CPT

## 2021-03-17 PROCEDURE — 84484 ASSAY OF TROPONIN QUANT: CPT

## 2021-03-17 PROCEDURE — 74011250637 HC RX REV CODE- 250/637: Performed by: EMERGENCY MEDICINE

## 2021-03-17 PROCEDURE — 36415 COLL VENOUS BLD VENIPUNCTURE: CPT

## 2021-03-17 RX ORDER — AMLODIPINE BESYLATE 10 MG/1
10 TABLET ORAL DAILY
Qty: 30 TAB | Refills: 0 | Status: SHIPPED | OUTPATIENT
Start: 2021-03-17 | End: 2021-04-02 | Stop reason: SDUPTHER

## 2021-03-17 RX ORDER — AMLODIPINE BESYLATE 5 MG/1
5 TABLET ORAL
Status: COMPLETED | OUTPATIENT
Start: 2021-03-17 | End: 2021-03-17

## 2021-03-17 RX ADMIN — AMLODIPINE BESYLATE 5 MG: 5 TABLET ORAL at 14:15

## 2021-03-17 NOTE — ED NOTES
Adriana Hernandez reviewed discharge instructions with the patient. The patient verbalized understanding. All questions and concerns were addressed. The patient declined a wheelchair and is discharged ambulatory in the care of family members with instructions and prescriptions in hand. Pt is alert and oriented x 4. Respirations are clear and unlabored.

## 2021-03-18 NOTE — ED PROVIDER NOTES
EMERGENCY DEPARTMENT HISTORY AND PHYSICAL EXAM      Date: 3/17/2021  Patient Name: Jaqui Swanson    Please note that this dictation was completed with [employer], the computer voice recognition software. Quite often unanticipated grammatical, syntax, homophones, and other interpretive errors are inadvertently transcribed by the computer software. Please disregard these errors. Please excuse any errors that have escaped final proofreading. History of Presenting Illness     Chief Complaint   Patient presents with    Hypertension     High BP saw pcp yesterday who upped her medicine but reports that it has not come down yet and they will not call her back. Pt reports some light headedness that just started but reports it isnt severe and she has this transient light headedness like this plenty of times       History Provided By: Patient     HPI: Jaqui Swanson, 77 y.o. female, with a past medical history significant for hypertension presenting the emergency department complaining of elevated blood pressure. She states her blood pressure has been running high for the last several days, went to see her PCP yesterday who increased her dose of valsartan, but she states her blood pressure still running high today. Patient denies any chest pain, denies any shortness of breath. Denies any visual disturbances, denies any headache, does complain of some intermittent lightheadedness mostly with standing, that is been going on for several days. Patient denies any fever, chills, cough. She reports compliance with all of her other blood pressure medicines. PCP: Brady Macdonald MD    No current facility-administered medications on file prior to encounter. Current Outpatient Medications on File Prior to Encounter   Medication Sig Dispense Refill    amoxicillin-clavulanate (AUGMENTIN) 875-125 mg per tablet Take 1 Tab by mouth every twelve (12) hours.       methylPREDNISolone (MEDROL DOSEPACK) 4 mg tablet Take 4 mg by mouth.  valsartan (DIOVAN) 320 mg tablet Take 1 Tab by mouth daily. 30 Tab 0    Bystolic 10 mg tablet TAKE 1 TABLET DAILY 90 Tab 3    loratadine 10 mg cap Take  by mouth daily.  EPINEPHrine (EPIPEN) 0.3 mg/0.3 mL injection INJECT CONTENTS OF 1 PEN AS NEEDED FOR ALLERGIC REACTION AS DIRECTED      YUVAFEM 10 mcg tab vaginal tablet Insert 1 Tab into vagina every Monday and Friday.  nystatin-triamcinolone (MYCOLOG II) topical cream as needed.  ALLERGIST TRAY 1CC 27GX1/2\" 1 mL 27 x 1/2\" syrg every seven (7) days. 99    Nebulizer Accessories kit Use as directed 1 Kit 3    cycloSPORINE (RESTASIS) 0.05 % ophthalmic emulsion Administer 1 Drop to both eyes two (2) times a day.  albuterol (PROAIR HFA) 90 mcg/actuation inhaler Take  by inhalation as needed for Wheezing.  valACYclovir (VALTREX) 1 gram tablet Take 1,000 mg by mouth daily.  mometasone-formoterol (DULERA) 100-5 mcg/actuation HFA inhaler Take 2 puffs by inhalation two (2) times a day.  mesalamine EC (ASACOL) 400 mg EC tablet Take 800 mg by mouth three (3) times daily.  hyoscyamine SL (LEVSIN/SL) 0.125 mg SL tablet 0.125 mg by SubLINGual route every four (4) hours.  CALCIUM CARBONATE (CALCIUM 300 PO) Take 600 mg by mouth daily.  pirbuterol (MAXAIR AUTOHALER) 200 mcg/Inhalation inhaler Take 2 Puffs by inhalation four (4) times daily as needed.  Omeprazole delayed release (PRILOSEC D/R) 20 mg tablet Take 40 mg by mouth daily.  estradiol (VAGIFEM) 25 mcg vaginal tablet Insert 25 mcg into vagina two (2) days a week.  olopatadine (PATANASE) 0.6 % Spry 2 Squirts by Both Nostrils route two (2) times a day.  biotin 2,500 mcg Tab Take 5,000 mcg by mouth daily.            Past History     Past Medical History:  Past Medical History:   Diagnosis Date    Allergic rhinitis 6/28/2017    Arthritis     Asthma     Asthma 6/28/2017    Impressio: continue nebs and Dulera, prednisone taper  Bradycardia 6/28/2017    Impression: asymptomatic, continue Bystolic    Cataract, bilateral 6/28/2017    Impression: low risk for elective surgical procedure, proceed without further risk stratification, EKG shows sinus domingo without acute ST-T changes which is her baseline    Crohn's disease ([address]     Crohn's disease ([address] 6/28/2017    Impression: GI following    Degenerative arthritis 6/28/2017    Impression: trial of topical NSAID, if persists can consider an injection    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, 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    Fatigue 6/28/2017    Flank pain 6/28/2017    Impression: most likely secondary to Crohn's, not cardiac by history, EKG normal Status is Inactive      Gastrointestinal disorder     crones    Hair loss 6/28/2017    Impression: refer to derm    Hematochezia 6/28/2017    Status is Inactive    Herpes Dx 2015    no outbreak as of 5/2/16    Hyperlipidemia 6/28/2017    Hypertension     Hypokalemia 6/28/2017    MRSA (methicillin resistant staph aureus) culture positive \"many years ago\" as of 5/2/16    pt states + nasal swab \"many years ago\", Tx and no + since; UNCONFIRMED    Neck pain 6/28/2017    Impresssion: finish PT, continue HEP, pain med prn    Osteoporosis 6/28/2017    Otitis media, acute 6/28/2017    Impression: follow up next week, finish abx Status is Inactive    Pneumonia 6/28/2017    Impression:  Completed course of antibiotics, clinically resolved Status is Inactive    Traumatic hematoma of scalp 6/28/2017    Status is Inactive    Ulcerative colitis ([address] 6/28/2017    Vaginal yeast infection 6/28/2017    Impression, consider probiotic, check blood sugar, follow up GYN Status is Inactive       Past Surgical History:  Past Surgical History:   Procedure Laterality Date    HX CATARACT REMOVAL Right 3/28/16    HX MOHS PROCEDURES Right     HX ORTHOPAEDIC Left     left knee sx    HX ORTHOPAEDIC Right     trigger release      HX TUBAL LIGATION         Family History:  Family History   Problem Relation Age of Onset    Breast Cancer Maternal Aunt         ?  Heart Disease Mother     Asthma Father     Stroke Father        Social History:  Social History     Tobacco Use    Smoking status: Never Smoker    Smokeless tobacco: Never Used   Substance Use Topics    Alcohol use: No    Drug use: No       Allergies: Allergies   Allergen Reactions    Latex Rash    Aldactone [Spironolactone] Hives    Chocolate [Cocoa] Rash    Cinnamon Rash    Hydrocodone Other (comments)     Vomiting, syncope    Lisinopril Hives         Review of Systems   Review of Systems   Constitutional: Negative for chills and fever. HENT: Negative for congestion and sore throat. Eyes: Negative for visual disturbance. Respiratory: Negative for cough and shortness of breath. Cardiovascular: Negative for chest pain and leg swelling. Gastrointestinal: Negative for abdominal pain, blood in stool, diarrhea and nausea. Endocrine: Negative for polyuria. Genitourinary: Negative for dysuria, flank pain, vaginal bleeding and vaginal discharge. Musculoskeletal: Negative for myalgias. Skin: Negative for rash. Allergic/Immunologic: Negative for immunocompromised state. Neurological: Positive for light-headedness. Negative for weakness and headaches. Psychiatric/Behavioral: Negative for confusion. Physical Exam   Physical Exam  Vitals signs and nursing note reviewed. Constitutional:       Appearance: She is well-developed. HENT:      Head: Normocephalic and atraumatic. Eyes:      General:         Right eye: No discharge. Left eye: No discharge. Conjunctiva/sclera: Conjunctivae normal.      Pupils: Pupils are equal, round, and reactive to light. Neck:      Musculoskeletal: Normal range of motion and neck supple. Trachea: No tracheal deviation. Cardiovascular:      Rate and Rhythm: Normal rate and regular rhythm.       Heart sounds: Normal heart sounds. No murmur. Pulmonary:      Effort: Pulmonary effort is normal. No respiratory distress. Breath sounds: Normal breath sounds. No wheezing or rales. Abdominal:      General: Bowel sounds are normal.      Palpations: Abdomen is soft. Tenderness: There is no abdominal tenderness. There is no guarding or rebound. Musculoskeletal: Normal range of motion. General: No tenderness or deformity. Skin:     General: Skin is warm and dry. Findings: No erythema or rash. Neurological:      Mental Status: She is alert and oriented to person, place, and time. Comments: No facial droop, no slurring speech, equal strength in the upper and lower extremities. Psychiatric:      Comments: Mildly anxious appearing         Diagnostic Study Results     Labs -   No results found for this or any previous visit (from the past 12 hour(s)). Radiologic Studies -   No orders to display     CT Results  (Last 48 hours)    None        CXR Results  (Last 48 hours)    None            Medical Decision Making   I am the first provider for this patient. I reviewed the vital signs, available nursing notes, past medical history, past surgical history, family history and social history. Vital Signs-Reviewed the patient's vital signs. No data found. EKG interpretation: (Preliminary)  EKG shows sinus bradycardia. No evidence of ST elevation myocardial infarction. Rate is 49. QTc 417. Interpreted by me    Records Reviewed:   Nursing notes, Prior visits     Provider Notes (Medical Decision Making):   Patient presenting with what is most likely just asymptomatic hypertension. She looks well and nontoxic. Labs are reassuring we will increase her amlodipine to 10 mg once daily. She is bradycardic, this is likely secondary to her Bystolic. After long discussion with patient she was very much reassured, was feeling better.   Will slightly low her her blood pressure over time by increasing amlodipine, may need further blood pressure control at home, I have directed the patient to only check her blood pressure once or twice a day, she reportedly has been rechecking her blood pressure over and over again which is likely exacerbating hypertension, by causing more stress and anxiety. ED Course:   Initial assessment performed. The patients presenting problems have been discussed, and they are in agreement with the care plan formulated and outlined with them. I have encouraged them to ask questions as they arise throughout their visit. Critical Care Time:   none    Disposition:    DISCHARGE NOTE  Patients results have been reviewed with them. Patient and/or family have verbally conveyed their understanding and agreement of the patient's signs, symptoms, diagnosis, treatment and prognosis and additionally agree to follow up as recommended or return to the Emergency Room should their condition change or have any new concerns prior to their follow-up appointment. Patient verbally agrees with the care-plan and verbally conveys that all of their questions have been answered. Discharge instructions have also been provided to the patient with some educational information regarding their diagnosis as well a list of reasons why they would want to return to the ER prior to their follow-up appointment should their condition change. PLAN:  1. Discharge Medication List as of 3/17/2021  2:20 PM      CONTINUE these medications which have CHANGED    Details   amLODIPine (NORVASC) 10 mg tablet Take 1 Tab by mouth daily. , Normal, Disp-30 Tab, R-0         CONTINUE these medications which have NOT CHANGED    Details   amoxicillin-clavulanate (AUGMENTIN) 875-125 mg per tablet Take 1 Tab by mouth every twelve (12) hours. , Historical Med      methylPREDNISolone (MEDROL DOSEPACK) 4 mg tablet Take 4 mg by mouth., Historical Med      valsartan (DIOVAN) 320 mg tablet Take 1 Tab by mouth daily. , Normal, Disp-30 Tab, R-0To take place of olmesartan since this is not available or backordered. Bystolic 10 mg tablet TAKE 1 TABLET DAILY, Normal, Disp-90 Tab, R-3, LAM      loratadine 10 mg cap Take  by mouth daily. , Historical Med      EPINEPHrine (EPIPEN) 0.3 mg/0.3 mL injection INJECT CONTENTS OF 1 PEN AS NEEDED FOR ALLERGIC REACTION AS DIRECTED, Historical Med      !! YUVAFEM 10 mcg tab vaginal tablet Insert 1 Tab into vagina every Monday and Friday., Historical Med, LAM      nystatin-triamcinolone (MYCOLOG II) topical cream as needed., Historical Med      ALLERGIST TRAY 1CC 27GX1/2\" 1 mL 27 x 1/2\" syrg every seven (7) days. , Historical Med, R-99, LAM      Nebulizer Accessories kit Use as directed, Print, Disp-1 Kit, R-3      cycloSPORINE (RESTASIS) 0.05 % ophthalmic emulsion Administer 1 Drop to both eyes two (2) times a day., Historical Med      albuterol (PROAIR HFA) 90 mcg/actuation inhaler Take  by inhalation as needed for Wheezing., Historical Med      valACYclovir (VALTREX) 1 gram tablet Take 1,000 mg by mouth daily. , Historical Med      mometasone-formoterol (DULERA) 100-5 mcg/actuation HFA inhaler Take 2 puffs by inhalation two (2) times a day., Historical Med      mesalamine EC (ASACOL) 400 mg EC tablet Take 800 mg by mouth three (3) times daily. , Historical Med      hyoscyamine SL (LEVSIN/SL) 0.125 mg SL tablet 0.125 mg by SubLINGual route every four (4) hours. , Historical Med      CALCIUM CARBONATE (CALCIUM 300 PO) Take 600 mg by mouth daily. , Historical Med      pirbuterol (MAXAIR AUTOHALER) 200 mcg/Inhalation inhaler Take 2 Puffs by inhalation four (4) times daily as needed.  , Historical Med      Omeprazole delayed release (PRILOSEC D/R) 20 mg tablet Take 40 mg by mouth daily. , Historical Med      !! estradiol (VAGIFEM) 25 mcg vaginal tablet Insert 25 mcg into vagina two (2) days a week.  , Historical Med      olopatadine (PATANASE) 0.6 % Spry 2 Squirts by Both Nostrils route two (2) times a day., Historical Med      biotin 2,500 mcg Tab Take 5,000 mcg by mouth daily. , Historical Med       !! - Potential duplicate medications found. Please discuss with provider. 2.   Follow-up Information     Follow up With Specialties Details Why Contact Info    Kiran Mayfield MD Internal Medicine Schedule an appointment as soon as possible for a visit   [place]  [phone]      [facility] EMERGENCY DEPT Emergency Medicine  If symptoms worsen [address]  [address]  [phone]          Return to ED if worse     Diagnosis     Clinical Impression:   1. Essential hypertension    2. Near syncope        Attestations:   This note was completed by Preethi Ramachandran DO

## 2021-04-02 ENCOUNTER — OFFICE VISIT (OUTPATIENT)
Dept: INTERNAL MEDICINE CLINIC | Age: 76
End: 2021-04-02
Payer: COMMERCIAL

## 2021-04-02 VITALS
RESPIRATION RATE: 16 BRPM | SYSTOLIC BLOOD PRESSURE: 168 MMHG | DIASTOLIC BLOOD PRESSURE: 90 MMHG | OXYGEN SATURATION: 98 % | WEIGHT: 145.4 LBS | TEMPERATURE: 97.5 F | BODY MASS INDEX: 26.76 KG/M2 | HEART RATE: 56 BPM | HEIGHT: 62 IN

## 2021-04-02 DIAGNOSIS — K50.80 CROHN'S DISEASE OF BOTH SMALL AND LARGE INTESTINE WITHOUT COMPLICATION (HCC): ICD-10-CM

## 2021-04-02 DIAGNOSIS — M81.0 AGE-RELATED OSTEOPOROSIS WITHOUT CURRENT PATHOLOGICAL FRACTURE: ICD-10-CM

## 2021-04-02 DIAGNOSIS — R07.9 CHEST PAIN, UNSPECIFIED TYPE: ICD-10-CM

## 2021-04-02 DIAGNOSIS — R53.83 FATIGUE, UNSPECIFIED TYPE: ICD-10-CM

## 2021-04-02 DIAGNOSIS — I10 ESSENTIAL HYPERTENSION: Primary | ICD-10-CM

## 2021-04-02 DIAGNOSIS — J45.40 MODERATE PERSISTENT ASTHMA WITHOUT COMPLICATION: ICD-10-CM

## 2021-04-02 DIAGNOSIS — E78.2 MIXED HYPERLIPIDEMIA: ICD-10-CM

## 2021-04-02 PROCEDURE — G8427 DOCREV CUR MEDS BY ELIG CLIN: HCPCS | Performed by: INTERNAL MEDICINE

## 2021-04-02 PROCEDURE — G8510 SCR DEP NEG, NO PLAN REQD: HCPCS | Performed by: INTERNAL MEDICINE

## 2021-04-02 PROCEDURE — G9899 SCRN MAM PERF RSLTS DOC: HCPCS | Performed by: INTERNAL MEDICINE

## 2021-04-02 PROCEDURE — 1101F PT FALLS ASSESS-DOCD LE1/YR: CPT | Performed by: INTERNAL MEDICINE

## 2021-04-02 PROCEDURE — 1090F PRES/ABSN URINE INCON ASSESS: CPT | Performed by: INTERNAL MEDICINE

## 2021-04-02 PROCEDURE — G8754 DIAS BP LESS 90: HCPCS | Performed by: INTERNAL MEDICINE

## 2021-04-02 PROCEDURE — G8753 SYS BP > OR = 140: HCPCS | Performed by: INTERNAL MEDICINE

## 2021-04-02 PROCEDURE — 99214 OFFICE O/P EST MOD 30 MIN: CPT | Performed by: INTERNAL MEDICINE

## 2021-04-02 PROCEDURE — G8419 CALC BMI OUT NRM PARAM NOF/U: HCPCS | Performed by: INTERNAL MEDICINE

## 2021-04-02 PROCEDURE — G8536 NO DOC ELDER MAL SCRN: HCPCS | Performed by: INTERNAL MEDICINE

## 2021-04-02 PROCEDURE — 3017F COLORECTAL CA SCREEN DOC REV: CPT | Performed by: INTERNAL MEDICINE

## 2021-04-02 RX ORDER — VALSARTAN 160 MG/1
160 TABLET ORAL DAILY
Qty: 90 TAB | Refills: 3 | Status: SHIPPED | OUTPATIENT
Start: 2021-04-02 | End: 2021-05-12 | Stop reason: DRUGHIGH

## 2021-04-02 RX ORDER — AMLODIPINE BESYLATE 10 MG/1
10 TABLET ORAL DAILY
Qty: 90 TAB | Refills: 3 | Status: SHIPPED | OUTPATIENT
Start: 2021-04-02 | End: 2022-06-02

## 2021-04-02 NOTE — PROGRESS NOTES
This note will not be viewable in [address] Soledad Johnson is a 77 y.o. female and presents with Hypertension (6 month follow up)  . Subjective:  Mrs. María Noonan presents today for follow-up of hypertension. After our last visit where her Diovan had been increased to 320 mg daily she noted continued increased blood pressure at the next day and presented to the emergency room for evaluation. She has had some intermittent chest discomfort associated with this. She remains on Bystolic 10 mg daily. Her amlodipine was originally 5 mg daily and this was increased to 10 mg daily when she was in the emergency room. Her troponin was negative and her labs were otherwise stable. She was concerned because her blood pressure had dropped on this medication after being in the ER and she decreased her Diovan to 160 mg daily. She remains on 10 mg of amlodipine daily. She has no current shortness of breath, chest pain, palpitations, PND, orthopnea, or pedal edema. She has had intermittent chest discomfort in the past and has also had a lot of gas and abdominal bloating given her history of Crohn's disease. She has a family history of heart disease and is concerned about whether she may have some underlying heart disease given her history of hypertension and family history.     Past Medical History:   Diagnosis Date    Allergic rhinitis 6/28/2017    Arthritis     Asthma     Asthma 6/28/2017    Impressio: continue nebs and Dulera, prednisone taper    Bradycardia 6/28/2017    Impression: asymptomatic, continue Bystolic    Cataract, bilateral 6/28/2017    Impression: low risk for elective surgical procedure, proceed without further risk stratification, EKG shows sinus domingo without acute ST-T changes which is her baseline    Crohn's disease ([address]     Crohn's disease ([address] 6/28/2017    Impression: GI following    Degenerative arthritis 6/28/2017    Impression: trial of topical NSAID, if persists can consider an injection    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, 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    Fatigue 6/28/2017    Flank pain 6/28/2017    Impression: most likely secondary to Crohn's, not cardiac by history, EKG normal Status is Inactive      Gastrointestinal disorder     crones    Hair loss 6/28/2017    Impression: refer to derm    Hematochezia 6/28/2017    Status is Inactive    Herpes Dx 2015    no outbreak as of 5/2/16    Hyperlipidemia 6/28/2017    Hypertension     Hypokalemia 6/28/2017    MRSA (methicillin resistant staph aureus) culture positive \"many years ago\" as of 5/2/16    pt states + nasal swab \"many years ago\", Tx and no + since; UNCONFIRMED    Neck pain 6/28/2017    Impresssion: finish PT, continue HEP, pain med prn    Osteoporosis 6/28/2017    Otitis media, acute 6/28/2017    Impression: follow up next week, finish abx Status is Inactive    Pneumonia 6/28/2017    Impression:  Completed course of antibiotics, clinically resolved Status is Inactive    Traumatic hematoma of scalp 6/28/2017    Status is Inactive    Ulcerative [address] [address] 6/28/2017    Vaginal yeast infection 6/28/2017    Impression, consider probiotic, check blood sugar, follow up GYN Status is Inactive     Past Surgical History:   Procedure Laterality Date    HX CATARACT REMOVAL Right 3/28/16    HX MOHS PROCEDURES Right     HX ORTHOPAEDIC Left     left knee sx    HX ORTHOPAEDIC Right     trigger release      HX TUBAL LIGATION       Allergies   Allergen Reactions    Latex Rash    Aldactone [Spironolactone] Hives    Chocolate [Cocoa] Rash    Cinnamon Rash    Hydrocodone Other (comments)     Vomiting, syncope    Lisinopril Hives     Current Outpatient Medications   Medication Sig Dispense Refill    valsartan (DIOVAN) 160 mg tablet Take 1 Tab by mouth daily. 90 Tab 3    amLODIPine (NORVASC) 10 mg tablet Take 1 Tab by mouth daily.  90 Tab 3    Bystolic 10 mg tablet TAKE 1 TABLET DAILY 90 Tab 3    loratadine 10 mg cap Take  by mouth daily.  EPINEPHrine (EPIPEN) 0.3 mg/0.3 mL injection INJECT CONTENTS OF 1 PEN AS NEEDED FOR ALLERGIC REACTION AS DIRECTED      ALLERGIST TRAY 1CC 27GX1/2\" 1 mL 27 x 1/2\" syrg every seven (7) days. 99    Nebulizer Accessories kit Use as directed 1 Kit 3    cycloSPORINE (RESTASIS) 0.05 % ophthalmic emulsion Administer 1 Drop to both eyes two (2) times a day.  albuterol (PROAIR HFA) 90 mcg/actuation inhaler Take  by inhalation as needed for Wheezing.  valACYclovir (VALTREX) 1 gram tablet Take 1,000 mg by mouth daily.  mometasone-formoterol (DULERA) 100-5 mcg/actuation HFA inhaler Take 2 puffs by inhalation two (2) times a day.  mesalamine EC (ASACOL) 400 mg EC tablet Take 800 mg by mouth three (3) times daily.  hyoscyamine SL (LEVSIN/SL) 0.125 mg SL tablet 0.125 mg by SubLINGual route every four (4) hours.  CALCIUM CARBONATE (CALCIUM 300 PO) Take 600 mg by mouth daily.  pirbuterol (MAXAIR AUTOHALER) 200 mcg/Inhalation inhaler Take 2 Puffs by inhalation four (4) times daily as needed.  Omeprazole delayed release (PRILOSEC D/R) 20 mg tablet Take 40 mg by mouth daily.  estradiol (VAGIFEM) 25 mcg vaginal tablet Insert 25 mcg into vagina two (2) days a week.  olopatadine (PATANASE) 0.6 % Spry 2 Squirts by Both Nostrils route two (2) times a day.  biotin 2,500 mcg Tab Take 5,000 mcg by mouth daily.  methylPREDNISolone (MEDROL DOSEPACK) 4 mg tablet Take 4 mg by mouth.  YUVAFEM 10 mcg tab vaginal tablet Insert 1 Tab into vagina every Monday and Friday.  nystatin-triamcinolone (MYCOLOG II) topical cream as needed.        Social History     Socioeconomic History    Marital status:      Spouse name: Not on file    Number of children: Not on file    Years of education: Not on file    Highest education level: Not on file   Tobacco Use    Smoking status: Never Smoker    Smokeless tobacco: Never Used Substance and Sexual Activity    Alcohol use: No    Drug use: No    Sexual activity: Never     Family History   Problem Relation Age of Onset    Breast Cancer Maternal Aunt         ?  Heart Disease Mother     Asthma Father     Stroke Father        Review of Systems  Constitutional:  negative for fevers, chills, anorexia and weight loss  Eyes:    negative for visual disturbance and irritation  ENT:    negative for tinnitus,sore throat,nasal congestion,ear pains. hoarseness  Respiratory:     negative for cough, hemoptysis, dyspnea,wheezing  CV:    negative for  palpitations, lower extremity edema  GI:    negative for nausea, vomiting, diarrhea, abdominal pain,melena  Endo:               negative for polyuria,polydipsia,polyphagia,heat intolerance  Genitourinary : negative for frequency, dysuria and hematuria  Integumentary: negative for rash and pruritus  Hematologic:   negative for easy bruising and gum/nose bleeding  Musculoskel:  negative for myalgias, arthralgias, back pain, muscle weakness, joint pain  Neurological:   negative for headaches, dizziness, vertigo, memory problems and gait   Behavl/Psych:  negative for feelings of anxiety, depression, mood changes  ROS otherwise negative      Objective:  Visit Vitals  BP (!) 168/90 (BP 1 Location: Right upper arm, BP Patient Position: Sitting, BP Cuff Size: Adult)   Pulse (!) 56   Temp 97.5 °F (36.4 °C) (Temporal)   Resp 16   Ht 5' 2\" (1.575 m)   Wt 145 lb 6.4 oz (66 kg)   SpO2 98%   BMI 26.59 kg/m²     Physical Exam:   General appearance - alert, well appearing, and in no distress  Mental status - alert, oriented to person, place, and time  EYE-YUDELKA, EOMI, fundi normal, corneas normal, no foreign bodies  ENT-ENT exam normal, no neck nodes or sinus tenderness  Nose - normal and patent, no erythema, discharge or polyps  Mouth - mucous membranes moist, pharynx normal without lesions  Neck - supple, no significant adenopathy   Chest - clear to auscultation, no wheezes, rales or rhonchi, symmetric air entry   Heart - normal rate, regular rhythm, normal S1, S2, no murmurs, rubs, clicks or gallops   Abdomen - soft, nontender, nondistended, no masses or organomegaly  Lymph- no adenopathy palpable  Ext-peripheral pulses normal, no pedal edema, no clubbing or cyanosis  Skin-Warm and dry. no hyperpigmentation, vitiligo, or suspicious lesions  Neuro -alert, oriented, normal speech, no focal findings or movement disorder noted      Assessment/Plan:  Diagnoses and all orders for this visit:    1. Essential hypertension  -     ECHO STRESS; Future    2. Moderate persistent asthma without complication    3. Age-related osteoporosis without current pathological fracture    4. Mixed hyperlipidemia    5. Fatigue, unspecified type    6. Crohn's disease of both small and large intestine without complication (HCC)    7. Chest pain, unspecified type  -     ECHO STRESS; Future    Other orders  -     valsartan (DIOVAN) 160 mg tablet; Take 1 Tab by mouth daily. -     amLODIPine (NORVASC) 10 mg tablet; Take 1 Tab by mouth daily. ICD-10-CM ICD-9-CM    1. Essential hypertension  I10 401.9 ECHO STRESS   2. Moderate persistent asthma without complication  N17.47 447.70    3. Age-related osteoporosis without current pathological fracture  M81.0 733.01    4. Mixed hyperlipidemia  E78.2 272.2    5. Fatigue, unspecified type  R53.83 780.79    6. Crohn's disease of both small and large intestine without complication (HCC)  P97.11 555.2    7. Chest pain, unspecified type  R07.9 786.50 ECHO STRESS       Plan:    Refer for a stress echo for further evaluation. She will remain on amlodipine 10 mg daily and we will continue valsartan 160 mg daily and Bystolic 10 mg daily. She will continue to monitor her blood pressure at home. She will schedule a follow-up visit in 1 month for reevaluation.   We may need to increase her valsartan to 320 mg daily and/or consider addition of a diuretic to help reduce her blood pressure. I have reviewed with the patient details of the assessment and plan and all questions were answered. Relevent patient education was performed. Verbal and/or written instructions (see AVS) provided. The most recent lab findings were reviewed with the patient. Plan was discussed with patient who verbally expressed understanding. An After Visit Summary was printed and given to the patient.     Kike Sandoval MD

## 2021-04-02 NOTE — PROGRESS NOTES
Mona Albert is a 77 y.o. female     Chief Complaint   Patient presents with    Hypertension     6 month follow up       Visit Vitals  BP (!) 180/82 (BP 1 Location: Left upper arm, BP Patient Position: Sitting, BP Cuff Size: Large adult)   Pulse (!) 56   Temp 97.5 °F (36.4 °C) (Temporal)   Resp 16   Ht 5' 2\" (1.575 m)   Wt 145 lb 6.4 oz (66 kg)   SpO2 98%   BMI 26.59 kg/m²       Health Maintenance Due   Topic Date Due    COVID-19 Vaccine (1) Never done    DTaP/Tdap/Td series (1 - Tdap) Never done    Shingrix Vaccine Age 50> (1 of 2) Never done    Pneumococcal 65+ years (2 of 2 - PPSV23) 08/21/2019       1. Have you been to the ER, urgent care clinic since your last visit? Hospitalized since your last visit? Yes seen at [facility] on 3/17/21 for elevated Blood Pressure. 2. Have you seen or consulted any other health care providers outside of the [address] [place] [address] since your last visit? Include any pap smears or colon screening.  No

## 2021-04-06 ENCOUNTER — HOSPITAL ENCOUNTER (OUTPATIENT)
Dept: NON INVASIVE DIAGNOSTICS | Age: 76
Discharge: HOME | End: 2021-04-06
Attending: INTERNAL MEDICINE
Payer: COMMERCIAL

## 2021-04-06 DIAGNOSIS — I10 ESSENTIAL HYPERTENSION: ICD-10-CM

## 2021-04-06 DIAGNOSIS — R07.9 CHEST PAIN, UNSPECIFIED TYPE: ICD-10-CM

## 2021-04-06 DIAGNOSIS — Z92.89 H/O CARDIOVASCULAR STRESS TEST: ICD-10-CM

## 2021-04-06 LAB
STRESS ANGINA INDEX: 0
STRESS BASELINE DIAS BP: 72 MMHG
STRESS BASELINE HR: 63 BPM
STRESS BASELINE SYS BP: 160 MMHG
STRESS ESTIMATED WORKLOAD: 7.8 METS
STRESS EXERCISE DUR MIN: NORMAL
STRESS PEAK DIAS BP: 98 MMHG
STRESS PEAK SYS BP: 200 MMHG
STRESS PERCENT HR ACHIEVED: 88 %
STRESS POST PEAK HR: 129 BPM
STRESS RATE PRESSURE PRODUCT: NORMAL BPM*MMHG
STRESS ST DEPRESSION: 0 MM
STRESS ST ELEVATION: 0 MM
STRESS STAGE 1 BP: NORMAL MMHG
STRESS STAGE 1 DURATION: NORMAL MIN:SEC
STRESS STAGE 1 HR: 100 BPM
STRESS STAGE 2 BP: NORMAL MMHG
STRESS STAGE 2 DURATION: NORMAL MIN:SEC
STRESS STAGE 2 HR: 120 BPM
STRESS STAGE 3 DURATION: NORMAL MIN:SEC
STRESS STAGE 3 HR: 120 BPM
STRESS STAGE RECOVERY 1 BP: NORMAL MMHG
STRESS STAGE RECOVERY 1 DURATION: NORMAL MIN:SEC
STRESS STAGE RECOVERY 1 HR: 78 BPM
STRESS TARGET HR: 146 BPM

## 2021-04-06 PROCEDURE — APPNB30 APP NON BILLABLE TIME 0-30 MINS: Performed by: NURSE PRACTITIONER

## 2021-04-06 PROCEDURE — 93351 STRESS TTE COMPLETE: CPT

## 2021-04-12 RX ORDER — NEBIVOLOL HYDROCHLORIDE 10 MG/1
TABLET ORAL
Qty: 90 TAB | Refills: 3 | Status: SHIPPED | OUTPATIENT
Start: 2021-04-12 | End: 2022-02-01 | Stop reason: SDUPTHER

## 2021-05-12 ENCOUNTER — OFFICE VISIT (OUTPATIENT)
Dept: INTERNAL MEDICINE CLINIC | Age: 76
End: 2021-05-12
Payer: COMMERCIAL

## 2021-05-12 VITALS
HEIGHT: 62 IN | TEMPERATURE: 97.7 F | SYSTOLIC BLOOD PRESSURE: 178 MMHG | DIASTOLIC BLOOD PRESSURE: 82 MMHG | HEART RATE: 65 BPM | WEIGHT: 140 LBS | OXYGEN SATURATION: 99 % | RESPIRATION RATE: 16 BRPM | BODY MASS INDEX: 25.76 KG/M2

## 2021-05-12 DIAGNOSIS — I10 ESSENTIAL HYPERTENSION: Primary | ICD-10-CM

## 2021-05-12 DIAGNOSIS — K50.80 CROHN'S DISEASE OF BOTH SMALL AND LARGE INTESTINE WITHOUT COMPLICATION (HCC): ICD-10-CM

## 2021-05-12 LAB
ALBUMIN SERPL-MCNC: 4 G/DL (ref 3.5–5)
ALBUMIN/GLOB SERPL: 1.1 {RATIO} (ref 1.1–2.2)
ALP SERPL-CCNC: 83 U/L (ref 45–117)
ALT SERPL-CCNC: 22 U/L (ref 12–78)
ANION GAP SERPL CALC-SCNC: 5 MMOL/L (ref 5–15)
AST SERPL-CCNC: 12 U/L (ref 15–37)
BASOPHILS # BLD: 0 K/UL (ref 0–0.1)
BASOPHILS NFR BLD: 1 % (ref 0–1)
BILIRUB SERPL-MCNC: 0.3 MG/DL (ref 0.2–1)
BUN SERPL-MCNC: 15 MG/DL (ref 6–20)
BUN/CREAT SERPL: 16 (ref 12–20)
CALCIUM SERPL-MCNC: 9.6 MG/DL (ref 8.5–10.1)
CHLORIDE SERPL-SCNC: 110 MMOL/L (ref 97–108)
CO2 SERPL-SCNC: 29 MMOL/L (ref 21–32)
CREAT SERPL-MCNC: 0.94 MG/DL (ref 0.55–1.02)
DIFFERENTIAL METHOD BLD: ABNORMAL
EOSINOPHIL # BLD: 0.5 K/UL (ref 0–0.4)
EOSINOPHIL NFR BLD: 8 % (ref 0–7)
ERYTHROCYTE [DISTWIDTH] IN BLOOD BY AUTOMATED COUNT: 17.1 % (ref 11.5–14.5)
GLOBULIN SER CALC-MCNC: 3.6 G/DL (ref 2–4)
GLUCOSE SERPL-MCNC: 86 MG/DL (ref 65–100)
HCT VFR BLD AUTO: 38.5 % (ref 35–47)
HGB BLD-MCNC: 11.7 G/DL (ref 11.5–16)
IMM GRANULOCYTES # BLD AUTO: 0 K/UL (ref 0–0.04)
IMM GRANULOCYTES NFR BLD AUTO: 0 % (ref 0–0.5)
LYMPHOCYTES # BLD: 2.3 K/UL (ref 0.8–3.5)
LYMPHOCYTES NFR BLD: 35 % (ref 12–49)
MCH RBC QN AUTO: 22.9 PG (ref 26–34)
MCHC RBC AUTO-ENTMCNC: 30.4 G/DL (ref 30–36.5)
MCV RBC AUTO: 75.2 FL (ref 80–99)
MONOCYTES # BLD: 0.5 K/UL (ref 0–1)
MONOCYTES NFR BLD: 8 % (ref 5–13)
NEUTS SEG # BLD: 3.2 K/UL (ref 1.8–8)
NEUTS SEG NFR BLD: 48 % (ref 32–75)
NRBC # BLD: 0 K/UL (ref 0–0.01)
NRBC BLD-RTO: 0 PER 100 WBC
PLATELET # BLD AUTO: 233 K/UL (ref 150–400)
POTASSIUM SERPL-SCNC: 4.5 MMOL/L (ref 3.5–5.1)
PROT SERPL-MCNC: 7.6 G/DL (ref 6.4–8.2)
RBC # BLD AUTO: 5.12 M/UL (ref 3.8–5.2)
SODIUM SERPL-SCNC: 144 MMOL/L (ref 136–145)
WBC # BLD AUTO: 6.6 K/UL (ref 3.6–11)

## 2021-05-12 PROCEDURE — 99214 OFFICE O/P EST MOD 30 MIN: CPT | Performed by: INTERNAL MEDICINE

## 2021-05-12 RX ORDER — VALSARTAN 320 MG/1
TABLET ORAL
Qty: 90 TAB | Refills: 1 | Status: SHIPPED | OUTPATIENT
Start: 2021-05-12 | End: 2022-01-10

## 2021-05-12 RX ORDER — VALSARTAN 320 MG/1
TABLET ORAL
COMMUNITY
Start: 2021-04-08 | End: 2021-05-12 | Stop reason: SDUPTHER

## 2021-05-12 NOTE — PROGRESS NOTES
Labs are stable. Glucose normal.  Liver and kidney function are normal.  Complete blood count normal with stable hemoglobin. MCV just below normal range consistent with previous values.

## 2021-05-12 NOTE — PROGRESS NOTES
James Markham presents today at the clinic for    Chief Complaint   Patient presents with    Hypertension     follow up        Wt Readings from Last 3 Encounters:   05/12/21 140 lb (63.5 kg)   04/02/21 145 lb 6.4 oz (66 kg)   03/17/21 143 lb 15.4 oz (65.3 kg)     Temp Readings from Last 3 Encounters:   05/12/21 97.7 °F (36.5 °C) (Temporal)   04/02/21 97.5 °F (36.4 °C) (Temporal)   03/17/21 97.7 °F (36.5 °C)     BP Readings from Last 3 Encounters:   05/12/21 (!) 178/82   04/02/21 (!) 168/90   03/17/21 (!) 188/76     Pulse Readings from Last 3 Encounters:   05/12/21 65   04/02/21 (!) 56   03/17/21 (!) 45       Health Maintenance Due   Topic    COVID-19 Vaccine (1)    DTaP/Tdap/Td series (1 - Tdap)    Shingrix Vaccine Age 50> (1 of 2)    Pneumococcal 65+ years (2 of 2 - PPSV23)         Learning Assessment:  :     Learning Assessment 7/13/2017   PRIMARY LEARNER Patient   BARRIERS PRIMARY LEARNER NONE   CO-LEARNER CAREGIVER No   PRIMARY LANGUAGE ENGLISH   LEARNER PREFERENCE PRIMARY READING   ANSWERED BY Patient   RELATIONSHIP SELF       Depression Screening:  :     3 most recent PHQ Screens 4/2/2021   Little interest or pleasure in doing things Not at all   Feeling down, depressed, irritable, or hopeless Not at all   Total Score PHQ 2 0       Fall Risk Assessment:  :     Fall Risk Assessment, last 12 mths 4/2/2021   Able to walk? Yes   Fall in past 12 months? 0   Do you feel unsteady? 0   Are you worried about falling 0       Abuse Screening:  :     Abuse Screening Questionnaire 3/20/2020 8/21/2018 7/13/2017   Do you ever feel afraid of your partner? N N N   Are you in a relationship with someone who physically or mentally threatens you? N N N   Is it safe for you to go home? [place]        Coordination of Care Questionnaire:  :     1. Have you been to the ER, urgent care clinic since your last visit? Hospitalized since your last visit? no    2.  Have you seen or consulted any other health care providers outside of the [address] since your last visit? Include any pap smears or colon screening.  4/6/21 - stress echo

## 2021-05-12 NOTE — PROGRESS NOTES
This note will not be viewable in [address] Mariano Bass is a 77 y.o. female and presents with Hypertension (follow up)  . Subjective:  Mrs. Jazlyn Maher presents today for follow-up of hypertension. She is currently on bisoprolol 10 mg daily, amlodipine 10 mg daily, and valsartan 160 mg daily. (Her valsartan had been increased to 320 mg daily but she was still taking 160 mg table tablets daily. She refilled the prescription for the 160 mg tablet and is taking 1 tablet daily for the past 2 days.)  She has noticed some low blood pressures with a systolic of 266 and diastolics in the 07O. Most of her systolic pressures have been in the 140s and 150s. She had a exercise stress test that was negative for cardiac ischemia. She has not had any recurring chest pain or shortness of breath. She does have a history of Crohn's disease which is followed by GI. She remains on Asacol for this.     Past Medical History:   Diagnosis Date    Allergic rhinitis 6/28/2017    Arthritis     Asthma     Asthma 6/28/2017    Impressio: continue nebs and Dulera, prednisone taper    Bradycardia 6/28/2017    Impression: asymptomatic, continue Bystolic    Cataract, bilateral 6/28/2017    Impression: low risk for elective surgical procedure, proceed without further risk stratification, EKG shows sinus domingo without acute ST-T changes which is her baseline    Crohn's disease ([address]     Cro[facility]'s [facility] ([facility] 6/28/2017    Impression: GI following    Degenerative arthritis 6/28/2017    Impression: trial of topical NSAID, if persists can consider an injection    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, 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    Fatigue 6/28/2017    Flank pain 6/28/2017    Impression: most likely secondary to Crohn's, not cardiac by history, EKG normal Status is Inactive      Gastrointestinal disorder     crones    Hair loss 6/28/2017    Impression: refer to derm    Hematochezia 6/28/2017    Status is Inactive    Herpes Dx 2015    no outbreak as of 5/2/16    Hyperlipidemia 6/28/2017    Hypertension     Hypokalemia 6/28/2017    MRSA (methicillin resistant staph aureus) culture positive \"many years ago\" as of 5/2/16    pt states + nasal swab \"many years ago\", Tx and no + since; UNCONFIRMED    Neck pain 6/28/2017    Impresssion: finish PT, continue HEP, pain med prn    Osteoporosis 6/28/2017    Otitis media, acute 6/28/2017    Impression: follow up next week, finish abx Status is Inactive    Pneumonia 6/28/2017    Impression:  Completed course of antibiotics, clinically resolved Status is Inactive    Traumatic hematoma of scalp 6/28/2017    Status is Inactive    Ulcerative [address] ([facility] [address] 6/28/2017    Vaginal yeast infection 6/28/2017    Impression, consider probiotic, check blood sugar, follow up GYN Status is Inactive     Past Surgical History:   Procedure Laterality Date    HX CATARACT REMOVAL Right 3/28/16    HX MOHS PROCEDURES Right     HX ORTHOPAEDIC Left     left knee sx    HX ORTHOPAEDIC Right     trigger release      HX TUBAL LIGATION       Allergies   Allergen Reactions    Latex Rash    Aldactone [Spironolactone] Hives    Chocolate [Cocoa] Rash    Cinnamon Rash    Hydrocodone Other (comments)     Vomiting, syncope    Lisinopril Hives     Current Outpatient Medications   Medication Sig Dispense Refill    valsartan (DIOVAN) 320 mg tablet TAKE 1 TABLET BY MOUTH EVERY DAY      Bystolic 10 mg tablet TAKE 1 TABLET DAILY 90 Tab 3    amLODIPine (NORVASC) 10 mg tablet Take 1 Tab by mouth daily. 90 Tab 3    loratadine 10 mg cap Take  by mouth daily.  EPINEPHrine (EPIPEN) 0.3 mg/0.3 mL injection INJECT CONTENTS OF 1 PEN AS NEEDED FOR ALLERGIC REACTION AS DIRECTED      YUVAFEM 10 mcg tab vaginal tablet Insert 1 Tab into vagina every Monday and Friday.  nystatin-triamcinolone (MYCOLOG II) topical cream as needed.       ALLERGIST TRAY 1CC 27GX1/2\" 1 mL 27 x 1/2\" syrg every seven (7) days.  99    Nebulizer Accessories kit Use as directed 1 Kit 3    cycloSPORINE (RESTASIS) 0.05 % ophthalmic emulsion Administer 1 Drop to both eyes two (2) times a day.  albuterol (PROAIR HFA) 90 mcg/actuation inhaler Take  by inhalation as needed for Wheezing.  valACYclovir (VALTREX) 1 gram tablet Take 1,000 mg by mouth daily.  mometasone-formoterol (DULERA) 100-5 mcg/actuation HFA inhaler Take 2 puffs by inhalation two (2) times a day.  mesalamine EC (ASACOL) 400 mg EC tablet Take 800 mg by mouth three (3) times daily.  hyoscyamine SL (LEVSIN/SL) 0.125 mg SL tablet 0.125 mg by SubLINGual route every four (4) hours.  CALCIUM CARBONATE (CALCIUM 300 PO) Take 600 mg by mouth daily.  pirbuterol (MAXAIR AUTOHALER) 200 mcg/Inhalation inhaler Take 2 Puffs by inhalation four (4) times daily as needed.  Omeprazole delayed release (PRILOSEC D/R) 20 mg tablet Take 40 mg by mouth daily.  estradiol (VAGIFEM) 25 mcg vaginal tablet Insert 25 mcg into vagina two (2) days a week.  olopatadine (PATANASE) 0.6 % Spry 2 Squirts by Both Nostrils route two (2) times a day.  biotin 2,500 mcg Tab Take 5,000 mcg by mouth daily.  valsartan (DIOVAN) 160 mg tablet Take 1 Tab by mouth daily. 80 Tab 3     Social History     Socioeconomic History    Marital status:      Spouse name: Not on file    Number of children: Not on file    Years of education: Not on file    Highest education level: Not on file   Tobacco Use    Smoking status: Never Smoker    Smokeless tobacco: Never Used   Substance and Sexual Activity    Alcohol use: No    Drug use: No    Sexual activity: Never     Family History   Problem Relation Age of Onset    Breast Cancer Maternal Aunt         ?     Heart Disease Mother     Asthma Father     Stroke Father        Review of Systems  Constitutional:  negative for fevers, chills, anorexia and weight loss  Eyes:    negative for visual disturbance and irritation  ENT:    negative for tinnitus,sore throat,nasal congestion,ear pains. hoarseness  Respiratory:     negative for cough, hemoptysis, dyspnea,wheezing  CV:    negative for chest pain, palpitations, lower extremity edema  GI:    negative for nausea, vomiting, diarrhea, abdominal pain,melena  Endo:               negative for polyuria,polydipsia,polyphagia,heat intolerance  Genitourinary : negative for frequency, dysuria and hematuria  Integumentary: negative for rash and pruritus  Hematologic:   negative for easy bruising and gum/nose bleeding  Musculoskel:  negative for myalgias, arthralgias, back pain, muscle weakness, joint pain  Neurological:   negative for headaches, dizziness, vertigo, memory problems and gait   Behavl/Psych:  negative for feelings of anxiety, depression, mood changes  ROS otherwise negative      Objective:  Visit Vitals  BP (!) 178/82 (BP 1 Location: Left upper arm, BP Patient Position: Sitting, BP Cuff Size: Large adult)   Pulse 65   Temp 97.7 °F (36.5 °C) (Temporal)   Resp 16   Ht 5' 2\" (1.575 m)   Wt 140 lb (63.5 kg)   SpO2 99%   BMI 25.61 kg/m²     Physical Exam:   General appearance - alert, well appearing, and in no distress  Mental status - alert, oriented to person, place, and time  EYE-YUDELKA, EOMI, fundi normal, corneas normal, no foreign bodies  ENT-ENT exam normal, no neck nodes or sinus tenderness  Nose - normal and patent, no erythema, discharge or polyps  Mouth - mucous membranes moist, pharynx normal without lesions  Neck - supple, no significant adenopathy   Chest - clear to auscultation, no wheezes, rales or rhonchi, symmetric air entry   Heart - normal rate, regular rhythm, normal S1, S2, no murmurs, rubs, clicks or gallops   Abdomen - soft, nontender, nondistended, no masses or organomegaly  Lymph- no adenopathy palpable  Ext-peripheral pulses normal, no pedal edema, no clubbing or cyanosis  Skin-Warm and dry.  no hyperpigmentation, vitiligo, or suspicious lesions  Neuro -alert, oriented, normal speech, no focal findings or movement disorder noted      Assessment/Plan:  Diagnoses and all orders for this visit:    1. Essential hypertension  -     CBC WITH AUTOMATED DIFF; Future  -     METABOLIC PANEL, COMPREHENSIVE; Future    2. Crohn's disease of both small and large intestine without complication ([facility] [address]  -     CBC WITH AUTOMATED DIFF; Future  -     METABOLIC PANEL, COMPREHENSIVE; Future          ICD-10-CM ICD-9-CM    1. Essential hypertension  I10 401.9 CBC WITH AUTOMATED DIFF      METABOLIC PANEL, COMPREHENSIVE   2. Crohn's disease of both small and large intestine without complication ([facility])  J67.56 555.2 CBC WITH AUTOMATED DIFF      METABOLIC PANEL, COMPREHENSIVE       Plan:    The patient's blood pressure was elevated in the office today. Repeat blood pressure by me was 158/70. We have increased her valsartan to 320 mg daily and she will continue this dose of medication. She will remain on Bystolic 10 mg daily and amlodipine 10 mg daily. She will continue to monitor blood pressure at home. She will call if she has any untoward symptoms or side effects with her medications. She will call if her blood pressure remains significantly elevated. Follow-up labs including CBC and metabolic profile for history of Crohn's disease. Forward results to GI for review. Follow-up in 6 months unless otherwise indicated. I have reviewed with the patient details of the assessment and plan and all questions were answered. Relevent patient education was performed. Verbal and/or written instructions (see AVS) provided. The most recent lab findings were reviewed with the patient. Plan was discussed with patient who verbally expressed understanding. An After Visit Summary was printed and given to the patient.     Raissa Avalos MD

## 2021-05-20 ENCOUNTER — TRANSCRIBE ORDER (OUTPATIENT)
Dept: SCHEDULING | Age: 76
End: 2021-05-20

## 2021-05-20 DIAGNOSIS — Z12.31 VISIT FOR SCREENING MAMMOGRAM: Primary | ICD-10-CM

## 2021-06-18 ENCOUNTER — HOSPITAL ENCOUNTER (OUTPATIENT)
Dept: MAMMOGRAPHY | Age: 76
Discharge: HOME | End: 2021-06-18
Attending: INTERNAL MEDICINE
Payer: COMMERCIAL

## 2021-06-18 DIAGNOSIS — Z12.31 VISIT FOR SCREENING MAMMOGRAM: ICD-10-CM

## 2021-06-18 PROCEDURE — 77067 SCR MAMMO BI INCL CAD: CPT

## 2021-11-17 ENCOUNTER — OFFICE VISIT (OUTPATIENT)
Dept: INTERNAL MEDICINE CLINIC | Age: 76
End: 2021-11-17
Payer: COMMERCIAL

## 2021-11-17 VITALS
OXYGEN SATURATION: 100 % | WEIGHT: 137 LBS | RESPIRATION RATE: 16 BRPM | TEMPERATURE: 98.3 F | DIASTOLIC BLOOD PRESSURE: 80 MMHG | BODY MASS INDEX: 25.21 KG/M2 | HEIGHT: 62 IN | HEART RATE: 66 BPM | SYSTOLIC BLOOD PRESSURE: 180 MMHG

## 2021-11-17 DIAGNOSIS — Z23 NEEDS FLU SHOT: ICD-10-CM

## 2021-11-17 DIAGNOSIS — K50.80 CROHN'S DISEASE OF BOTH SMALL AND LARGE INTESTINE WITHOUT COMPLICATION (HCC): ICD-10-CM

## 2021-11-17 DIAGNOSIS — I10 ESSENTIAL HYPERTENSION: Primary | ICD-10-CM

## 2021-11-17 DIAGNOSIS — J45.40 MODERATE PERSISTENT ASTHMA WITHOUT COMPLICATION: ICD-10-CM

## 2021-11-17 LAB
COMMENT, HOLDF: NORMAL
SAMPLES BEING HELD,HOLD: NORMAL

## 2021-11-17 PROCEDURE — 90471 IMMUNIZATION ADMIN: CPT | Performed by: INTERNAL MEDICINE

## 2021-11-17 PROCEDURE — 99214 OFFICE O/P EST MOD 30 MIN: CPT | Performed by: INTERNAL MEDICINE

## 2021-11-17 PROCEDURE — 90694 VACC AIIV4 NO PRSRV 0.5ML IM: CPT | Performed by: INTERNAL MEDICINE

## 2021-11-17 RX ORDER — AZELASTINE 1 MG/ML
1 SPRAY, METERED NASAL 2 TIMES DAILY
COMMUNITY
Start: 2021-08-21

## 2021-11-17 RX ORDER — ALUMINUM ZIRCONIUM OCTACHLOROHYDREX GLY 16 G/100G
1 GEL TOPICAL DAILY
COMMUNITY
End: 2022-07-19

## 2021-11-17 NOTE — PROGRESS NOTES
Kassy Jay is a 77 y.o. female and presents with Hypertension (follow up), Crohn's Disease (follow up), and Asthma (follow up)  . Subjective:  Mrs. Mahamed Giron presents today for follow-up of hypertension, Crohn's disease, history of asthma. She has no shortness of breath, chest pain, palpitations, PND, orthopnea, or pedal edema. She reports no side effects with her medications. She follows up with GI on a regular basis for her history of Crohn's. She does note increased gas over the past couple of months. She has been taking a probiotic which she states has helped. She will take Gas-X or Phazyme as needed. She has taken vinegar with water which seems to have been helpful for her as well.     Past Medical History:   Diagnosis Date    Allergic rhinitis 6/28/2017    Arthritis     Asthma     Asthma 6/28/2017    Impressio: continue nebs and Dulera, prednisone taper    Bradycardia 6/28/2017    Impression: asymptomatic, continue Bystolic    Cataract, bilateral 6/28/2017    Impression: low risk for elective surgical procedure, proceed without further risk stratification, EKG shows sinus domingo without acute ST-T changes which is her baseline    Crohn's disease ([address]     Crohn's disease ([facility] [address] 6/28/2017    Impression: GI following    Degenerative arthritis 6/28/2017    Impression: trial of topical NSAID, if persists can consider an injection    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, 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    Fatigue 6/28/2017    Flank pain 6/28/2017    Impression: most likely secondary to Crohn's, not cardiac by history, EKG normal Status is Inactive      Gastrointestinal disorder     crones    Hair loss 6/28/2017    Impression: refer to derm    Hematochezia 6/28/2017    Status is Inactive    Herpes Dx 2015    no outbreak as of 5/2/16    Hyperlipidemia 6/28/2017    Hypertension     Hypokalemia 6/28/2017    MRSA (methicillin resistant staph aureus) culture positive \"many years ago\" as of 5/2/16    pt states + nasal swab \"many years ago\", Tx and no + since; UNCONFIRMED    Neck pain 6/28/2017    Impresssion: finish PT, continue HEP, pain med prn    Osteoporosis 6/28/2017    Otitis media, acute 6/28/2017    Impression: follow up next week, finish abx Status is Inactive    Pneumonia 6/28/2017    Impression:  Completed course of antibiotics, clinically resolved Status is Inactive    Traumatic hematoma of scalp 6/28/2017    Status is Inactive    Ulcerative colitis ([facility] [address] 6/28/2017    Vaginal yeast infection 6/28/2017    Impression, consider probiotic, check blood sugar, follow up GYN Status is Inactive     Past Surgical History:   Procedure Laterality Date    HX CATARACT REMOVAL Right 3/28/16    HX MOHS PROCEDURES Right     HX ORTHOPAEDIC Left     left knee sx    HX ORTHOPAEDIC Right     trigger release      HX TUBAL LIGATION       Allergies   Allergen Reactions    Latex Rash    Aldactone [Spironolactone] Hives    Chocolate [Cocoa] Rash    Cinnamon Rash    Hydrocodone Other (comments)     Vomiting, syncope    Lisinopril Hives     Current Outpatient Medications   Medication Sig Dispense Refill    Bifidobacterium Infantis (Align) 4 mg cap Take 1 Tablet by mouth daily.  azelastine (ASTELIN) 137 mcg (0.1 %) nasal spray 1 Girard by Both Nostrils route two (2) times a day.  valsartan (DIOVAN) 320 mg tablet TAKE 1 TABLET BY MOUTH EVERY DAY  Indications: high blood pressure 90 Tab 1    Bystolic 10 mg tablet TAKE 1 TABLET DAILY 90 Tab 3    amLODIPine (NORVASC) 10 mg tablet Take 1 Tab by mouth daily. 90 Tab 3    loratadine 10 mg cap Take  by mouth daily.  EPINEPHrine (EPIPEN) 0.3 mg/0.3 mL injection INJECT CONTENTS OF 1 PEN AS NEEDED FOR ALLERGIC REACTION AS DIRECTED      nystatin-triamcinolone (MYCOLOG II) topical cream as needed.  ALLERGIST TRAY 1CC 27GX1/2\" 1 mL 27 x 1/2\" syrg every seven (7) days.   99    Nebulizer Accessories kit Use as directed 1 Kit 3  cycloSPORINE (RESTASIS) 0.05 % ophthalmic emulsion Administer 1 Drop to both eyes two (2) times a day.  albuterol (PROAIR HFA) 90 mcg/actuation inhaler Take  by inhalation as needed for Wheezing.  valACYclovir (VALTREX) 1 gram tablet Take 1,000 mg by mouth daily.  mometasone-formoterol (DULERA) 100-5 mcg/actuation HFA inhaler Take 2 puffs by inhalation two (2) times a day.  mesalamine EC (ASACOL) 400 mg EC tablet Take 800 mg by mouth three (3) times daily.  hyoscyamine SL (LEVSIN/SL) 0.125 mg SL tablet 0.125 mg by SubLINGual route every four (4) hours.  CALCIUM CARBONATE (CALCIUM 300 PO) Take 600 mg by mouth daily.  Omeprazole delayed release (PRILOSEC D/R) 20 mg tablet Take 40 mg by mouth daily.  estradiol (VAGIFEM) 25 mcg vaginal tablet Insert 25 mcg into vagina two (2) days a week.  biotin 2,500 mcg Tab Take 5,000 mcg by mouth daily.  pirbuterol (MAXAIR AUTOHALER) 200 mcg/Inhalation inhaler Take 2 Puffs by inhalation four (4) times daily as needed. (Patient not taking: Reported on 11/17/2021)       Social History     Socioeconomic History    Marital status:    Tobacco Use    Smoking status: Never Smoker    Smokeless tobacco: Never Used   Substance and Sexual Activity    Alcohol use: No    Drug use: No    Sexual activity: Never     Family History   Problem Relation Age of Onset    Breast Cancer Maternal Aunt         ?  Heart Disease Mother     Asthma Father     Stroke Father        Review of Systems  Constitutional:  negative for fevers, chills, anorexia and weight loss  Eyes:    negative for visual disturbance and irritation  ENT:    negative for tinnitus,sore throat,nasal congestion,ear pains. hoarseness  Respiratory:     negative for cough, hemoptysis, dyspnea,wheezing  CV:    negative for chest pain, palpitations, lower extremity edema  GI:    negative for nausea, vomiting, diarrhea, abdominal pain,melena  Endo: negative for polyuria,polydipsia,polyphagia,heat intolerance  Genitourinary : negative for frequency, dysuria and hematuria  Integumentary: negative for rash and pruritus  Hematologic:   negative for easy bruising and gum/nose bleeding  Musculoskel:  negative for myalgias, arthralgias, back pain, muscle weakness, joint pain  Neurological:   negative for headaches, dizziness, vertigo, memory problems and gait   Behavl/Psych:  negative for feelings of anxiety, depression, mood changes  ROS otherwise negative      Objective:  Visit Vitals  BP (!) 180/80 (BP 1 Location: Left upper arm, BP Patient Position: Sitting, BP Cuff Size: Adult)   Pulse 66   Temp 98.3 °F (36.8 °C)   Resp 16   Ht 5' 2\" (1.575 m)   Wt 137 lb (62.1 kg)   SpO2 100%   BMI 25.06 kg/m²     Physical Exam:   General appearance - alert, well appearing, and in no distress  Mental status - alert, oriented to person, place, and time  EYE-YUDELKA, EOMI, fundi normal, corneas normal, no foreign bodies  ENT-ENT exam normal, no neck nodes or sinus tenderness  Nose - normal and patent, no erythema, discharge or polyps  Mouth - mucous membranes moist, pharynx normal without lesions  Neck - supple, no significant adenopathy   Chest - clear to auscultation, no wheezes, rales or rhonchi, symmetric air entry   Heart - normal rate, regular rhythm, normal S1, S2, no murmurs, rubs, clicks or gallops   Abdomen - soft, nontender, nondistended, no masses or organomegaly  Lymph- no adenopathy palpable  Ext-peripheral pulses normal, no pedal edema, no clubbing or cyanosis  Skin-Warm and dry. no hyperpigmentation, vitiligo, or suspicious lesions  Neuro -alert, oriented, normal speech, no focal findings or movement disorder noted      Assessment/Plan:  Diagnoses and all orders for this visit:    1. Essential hypertension  -     LIPID PANEL; Future  -     METABOLIC PANEL, COMPREHENSIVE; Future  -     URINALYSIS W/ RFLX MICROSCOPIC; Future    2.  Moderate persistent asthma without complication    3. Crohn's disease of both small and large intestine without complication ([facility] [address]  -     CBC WITH AUTOMATED DIFF; Future          ICD-10-CM ICD-9-CM    1. Essential hypertension  I10 401.9 LIPID PANEL      METABOLIC PANEL, COMPREHENSIVE      URINALYSIS W/ RFLX MICROSCOPIC   2. Moderate persistent asthma without complication  V64.48 773.63    3. Crohn's disease of both small and large intestine without complication ([facility] [address]  Q07.80 555.2 CBC WITH AUTOMATED DIFF       Plan:    Continue current medical regimen as outlined above. Further recommendations based labs as ordered. Flu shot to be given today. The patient did note some tingling of the roof of her mouth after getting her temperature taken. She reports a latex allergy but it does not appear that she was exposed to latex because gloves are latex free and the temperature probes are plastic. She will call if she develops any worsening symptoms. I have reviewed with the patient details of the assessment and plan and all questions were answered. Relevent patient education was performed. Verbal and/or written instructions (see AVS) provided. The most recent lab findings were reviewed with the patient. Plan was discussed with patient who verbally expressed understanding. An After Visit Summary was printed and given to the patient.     Salvatore Lion MD

## 2021-11-17 NOTE — PROGRESS NOTES
Beryl Shannon presents today at the clinic for    Chief Complaint   Patient presents with    Hypertension     follow up    Crohn's Disease     follow up    Asthma     follow up        Wt Readings from Last 3 Encounters:   11/17/21 137 lb (62.1 kg)   05/12/21 140 lb (63.5 kg)   04/02/21 145 lb 6.4 oz (66 kg)     Temp Readings from Last 3 Encounters:   11/17/21 98.3 °F (36.8 °C)   05/12/21 97.7 °F (36.5 °C) (Temporal)   04/02/21 97.5 °F (36.4 °C) (Temporal)     BP Readings from Last 3 Encounters:   11/17/21 (!) 180/80   05/12/21 (!) 178/82   04/02/21 (!) 168/90     Pulse Readings from Last 3 Encounters:   11/17/21 66   05/12/21 65   04/02/21 (!) 56       Health Maintenance Due   Topic    DTaP/Tdap/Td series (1 - Tdap)    Shingrix Vaccine Age 50> (1 of 2)    Pneumococcal 65+ years (2 of 2 - PPSV23)    Flu Vaccine (1)         Learning Assessment:  :     Learning Assessment 7/13/2017   PRIMARY LEARNER Patient   BARRIERS PRIMARY LEARNER NONE   CO-LEARNER CAREGIVER No   PRIMARY LANGUAGE ENGLISH   LEARNER PREFERENCE PRIMARY READING   ANSWERED BY Patient   RELATIONSHIP SELF       Depression Screening:  :     3 most recent PHQ Screens 11/17/2021   Little interest or pleasure in doing things Not at all   Feeling down, depressed, irritable, or hopeless Not at all   Total Score PHQ 2 0       Fall Risk Assessment:  :     Fall Risk Assessment, last 12 mths 11/17/2021   Able to walk? Yes   Fall in past 12 months? 0   Do you feel unsteady? 0   Are you worried about falling 0       Abuse Screening:  :     Abuse Screening Questionnaire 11/17/2021 3/20/2020 8/21/2018 7/13/2017   Do you ever feel afraid of your partner? N N N N   Are you in a relationship with someone who physically or mentally threatens you? N N N N   Is it safe for you to go home? Billy Beltre       Coordination of Care Questionnaire:  :     1. Have you been to the ER, urgent care clinic since your last visit? Hospitalized since your last visit? no    2.  Have you seen or consulted any other health care providers outside of the [address] [place] [address] since your last visit? Include any pap smears or colon screening. 10/19/21 - Dr Alexander Rizvi for left knee. Normal mammogram - 6/18/21    After obtaining consent, and per orders of Dr. King Herrera, injection of flu shot given by Vale Guzmán LPN. Patient instructed to remain in clinic for 20 minutes afterwards, and to report any adverse reaction to me immediately.

## 2021-11-17 NOTE — PATIENT INSTRUCTIONS
Vaccine Information Statement    Influenza (Flu) Vaccine (Inactivated or Recombinant): What You Need to Know    Many vaccine information statements are available in [place] and other languages. See www.immunize.org/vis. Hojas de información sobre vacunas están disponibles en español y en muchos otros idiomas. Visite www.immunize.org/vis. 1. Why get vaccinated? Influenza vaccine can prevent influenza (flu). Flu is a contagious disease that spreads around the [facility] every year, usually between October and May. Anyone can get the flu, but it is more dangerous for some people. Infants and young children, people 72 years and older, pregnant people, and people with certain health conditions or a weakened immune system are at greatest risk of flu complications. Pneumonia, bronchitis, sinus infections, and ear infections are examples of flu-related complications. If you have a medical condition, such as heart disease, cancer, or diabetes, flu can make it worse. Flu can cause fever and chills, sore throat, muscle aches, fatigue, cough, headache, and runny or stuffy nose. Some people may have vomiting and diarrhea, though this is more common in children than adults. In an average year, thousands of people in the [facility] die from flu, and many more are hospitalized. Flu vaccine prevents millions of illnesses and flu-related visits to the doctor each year. 2. Influenza vaccines     CDC recommends everyone 6 months and older get vaccinated every flu season. Children 6 months through 6years of age may need 2 doses during a single flu season. Everyone else needs only 1 dose each flu season. It takes about 2 weeks for protection to develop after vaccination. There are many flu viruses, and they are always changing. Each year a new flu vaccine is made to protect against the influenza viruses believed to be likely to cause disease in the upcoming flu season.  Even when the vaccine doesnt exactly match these viruses, it may still provide some protection. Influenza vaccine does not cause flu. Influenza vaccine may be given at the same time as other vaccines. 3. Talk with your health care provider    Tell your vaccination provider if the person getting the vaccine:   Has had an allergic reaction after a previous dose of influenza vaccine, or has any severe, life-threatening allergies    Has ever had Guillain-Barré Syndrome (also called GBS)    In some cases, your health care provider may decide to postpone influenza vaccination until a future visit. Influenza vaccine can be administered at any time during pregnancy. People who are or will be pregnant during influenza season should receive inactivated influenza vaccine. People with minor illnesses, such as a cold, may be vaccinated. People who are moderately or severely ill should usually wait until they recover before getting influenza vaccine. Your health care provider can give you more information. 4. Risks of a vaccine reaction     Soreness, redness, and swelling where the shot is given, fever, muscle aches, and headache can happen after influenza vaccination.  There may be a very small increased risk of Guillain-Barré Syndrome (GBS) after inactivated influenza vaccine (the flu shot). Leoma March children who get the flu shot along with pneumococcal vaccine (PCV13) and/or DTaP vaccine at the same time might be slightly more likely to have a seizure caused by fever. Tell your health care provider if a child who is getting flu vaccine has ever had a seizure. People sometimes faint after medical procedures, including vaccination. Tell your provider if you feel dizzy or have vision changes or ringing in the ears. As with any medicine, there is a very remote chance of a vaccine causing a severe allergic reaction, other serious injury, or death. 5. What if there is a serious problem?     An allergic reaction could occur after the vaccinated person leaves the clinic. If you see signs of a severe allergic reaction (hives, swelling of the face and throat, difficulty breathing, a fast heartbeat, dizziness, or weakness), call 9-1-1 and get the person to the nearest hospital.    For other signs that concern you, call your health care provider. Adverse reactions should be reported to the Vaccine Adverse Event Reporting System (VAERS). Your health care provider will usually file this report, or you can do it yourself. Visit the VAERS website at www.vaers. [facility].gov or call 6-390.409.4052. VAERS is only for reporting reactions, and VAERS staff members do not give medical advice. 6. The National Vaccine Injury Compensation Program    The [facility] Vaccine Injury Compensation Program (VICP) is a federal program that was created to compensate people who may have been injured by certain vaccines. Claims regarding alleged injury or death due to vaccination have a time limit for filing, which may be as short as two years. Visit the VICP website at www.[facility].gov/vaccinecompensation or call 8-353.619.5402 to learn about the program and about filing a claim. 7. How can I learn more?  Ask your health care provider.  Call your local or state health department.  Visit the website of the Food and Drug Administration (FDA) for vaccine package inserts and additional information at www.fda.gov/vaccines-blood-biologics/vaccines.  Contact the Centers for Disease Control and Prevention (CDC):  - Call 8-887.487.3129 (1-800-CDC-INFO) or  - Visit CDCs influenza website at www.cdc.gov/flu. Vaccine Information Statement   Inactivated Influenza Vaccine   8/6/2021  42 DUNCAN Knott [id number]   Department of Health and Human Services  Centers for Disease Control and Prevention    Office Use Only

## 2021-11-18 LAB
ALBUMIN SERPL-MCNC: 4 G/DL (ref 3.5–5)
ALBUMIN/GLOB SERPL: 1 {RATIO} (ref 1.1–2.2)
ALP SERPL-CCNC: 86 U/L (ref 45–117)
ALT SERPL-CCNC: 24 U/L (ref 12–78)
ANION GAP SERPL CALC-SCNC: 4 MMOL/L (ref 5–15)
APPEARANCE UR: CLEAR
AST SERPL-CCNC: 15 U/L (ref 15–37)
BACTERIA URNS QL MICRO: NEGATIVE /HPF
BASOPHILS # BLD: 0 K/UL (ref 0–0.1)
BASOPHILS NFR BLD: 0 % (ref 0–1)
BILIRUB SERPL-MCNC: 0.4 MG/DL (ref 0.2–1)
BILIRUB UR QL: NEGATIVE
BUN SERPL-MCNC: 18 MG/DL (ref 6–20)
BUN/CREAT SERPL: 19 (ref 12–20)
CALCIUM SERPL-MCNC: 10 MG/DL (ref 8.5–10.1)
CHLORIDE SERPL-SCNC: 109 MMOL/L (ref 97–108)
CHOLEST SERPL-MCNC: 277 MG/DL
CO2 SERPL-SCNC: 29 MMOL/L (ref 21–32)
COLOR UR: ABNORMAL
CREAT SERPL-MCNC: 0.95 MG/DL (ref 0.55–1.02)
DIFFERENTIAL METHOD BLD: ABNORMAL
EOSINOPHIL # BLD: 0.2 K/UL (ref 0–0.4)
EOSINOPHIL NFR BLD: 3 % (ref 0–7)
EPITH CASTS URNS QL MICRO: ABNORMAL /LPF
ERYTHROCYTE [DISTWIDTH] IN BLOOD BY AUTOMATED COUNT: 16.8 % (ref 11.5–14.5)
GLOBULIN SER CALC-MCNC: 3.9 G/DL (ref 2–4)
GLUCOSE SERPL-MCNC: 91 MG/DL (ref 65–100)
GLUCOSE UR STRIP.AUTO-MCNC: NEGATIVE MG/DL
HCT VFR BLD AUTO: 39.8 % (ref 35–47)
HDLC SERPL-MCNC: 96 MG/DL
HDLC SERPL: 2.9 {RATIO} (ref 0–5)
HGB BLD-MCNC: 12 G/DL (ref 11.5–16)
HGB UR QL STRIP: NEGATIVE
IMM GRANULOCYTES # BLD AUTO: 0 K/UL (ref 0–0.04)
IMM GRANULOCYTES NFR BLD AUTO: 0 % (ref 0–0.5)
KETONES UR QL STRIP.AUTO: NEGATIVE MG/DL
LDLC SERPL CALC-MCNC: 161.4 MG/DL (ref 0–100)
LEUKOCYTE ESTERASE UR QL STRIP.AUTO: ABNORMAL
LYMPHOCYTES # BLD: 2.3 K/UL (ref 0.8–3.5)
LYMPHOCYTES NFR BLD: 35 % (ref 12–49)
MCH RBC QN AUTO: 22.8 PG (ref 26–34)
MCHC RBC AUTO-ENTMCNC: 30.2 G/DL (ref 30–36.5)
MCV RBC AUTO: 75.7 FL (ref 80–99)
MONOCYTES # BLD: 0.5 K/UL (ref 0–1)
MONOCYTES NFR BLD: 7 % (ref 5–13)
NEUTS SEG # BLD: 3.6 K/UL (ref 1.8–8)
NEUTS SEG NFR BLD: 54 % (ref 32–75)
NITRITE UR QL STRIP.AUTO: NEGATIVE
NRBC # BLD: 0 K/UL (ref 0–0.01)
NRBC BLD-RTO: 0 PER 100 WBC
PH UR STRIP: 6 [PH] (ref 5–8)
PLATELET # BLD AUTO: 243 K/UL (ref 150–400)
PMV BLD AUTO: ABNORMAL FL (ref 8.9–12.9)
POTASSIUM SERPL-SCNC: 4.5 MMOL/L (ref 3.5–5.1)
PROT SERPL-MCNC: 7.9 G/DL (ref 6.4–8.2)
PROT UR STRIP-MCNC: NEGATIVE MG/DL
RBC # BLD AUTO: 5.26 M/UL (ref 3.8–5.2)
RBC #/AREA URNS HPF: ABNORMAL /HPF (ref 0–5)
SODIUM SERPL-SCNC: 142 MMOL/L (ref 136–145)
SP GR UR REFRACTOMETRY: <1.005 (ref 1–1.03)
TRIGL SERPL-MCNC: 98 MG/DL (ref ?–150)
UROBILINOGEN UR QL STRIP.AUTO: 0.2 EU/DL (ref 0.2–1)
VLDLC SERPL CALC-MCNC: 19.6 MG/DL
WBC # BLD AUTO: 6.7 K/UL (ref 3.6–11)
WBC URNS QL MICRO: ABNORMAL /HPF (ref 0–4)

## 2022-01-10 DIAGNOSIS — I10 ESSENTIAL HYPERTENSION: ICD-10-CM

## 2022-01-10 RX ORDER — VALSARTAN 320 MG/1
TABLET ORAL
Qty: 90 TABLET | Refills: 1 | Status: SHIPPED | OUTPATIENT
Start: 2022-01-10 | End: 2022-06-23 | Stop reason: ALTCHOICE

## 2022-01-12 ENCOUNTER — TELEPHONE (OUTPATIENT)
Dept: INTERNAL MEDICINE CLINIC | Age: 77
End: 2022-01-12

## 2022-01-12 NOTE — TELEPHONE ENCOUNTER
Patient called stating she started with vertigo this morning. She is scheduled to see Dr. Kate Membreno tomorrow, but wanted to know what can she do today to help with the vertigo.

## 2022-01-13 ENCOUNTER — OFFICE VISIT (OUTPATIENT)
Dept: INTERNAL MEDICINE CLINIC | Age: 77
End: 2022-01-13
Payer: COMMERCIAL

## 2022-01-13 VITALS
WEIGHT: 141 LBS | OXYGEN SATURATION: 99 % | SYSTOLIC BLOOD PRESSURE: 138 MMHG | RESPIRATION RATE: 16 BRPM | HEART RATE: 57 BPM | BODY MASS INDEX: 25.95 KG/M2 | DIASTOLIC BLOOD PRESSURE: 70 MMHG | HEIGHT: 62 IN

## 2022-01-13 DIAGNOSIS — I10 ESSENTIAL HYPERTENSION: ICD-10-CM

## 2022-01-13 DIAGNOSIS — R42 VERTIGO: Primary | ICD-10-CM

## 2022-01-13 PROCEDURE — 99213 OFFICE O/P EST LOW 20 MIN: CPT | Performed by: INTERNAL MEDICINE

## 2022-01-13 RX ORDER — MECLIZINE HYDROCHLORIDE 25 MG/1
25 TABLET ORAL
Qty: 30 TABLET | Refills: 0 | Status: SHIPPED | OUTPATIENT
Start: 2022-01-13 | End: 2022-01-23

## 2022-01-13 NOTE — PROGRESS NOTES
Lm Garcias is a 77 y.o. female and presents with Dizziness  . Subjective:  Mrs. Soo Rod presents today with complaint of dizziness. She states that after awakening yesterday morning and getting out of bed she became very dizzy. This lasted for some time and was associated with some nausea. The symptoms seem to subside later in the day. She has had some recurring symptoms this morning. She remains on valsartan and Bystolic and amlodipine for hypertension. Her blood pressure has been controlled and she has checked this at home. She had a bout of vertigo a couple of years ago that was more severe.     Past Medical History:   Diagnosis Date    Allergic rhinitis 6/28/2017    Arthritis     Asthma     Asthma 6/28/2017    Impressio: continue nebs and Dulera, prednisone taper    Bradycardia 6/28/2017    Impression: asymptomatic, continue Bystolic    Cataract, bilateral 6/28/2017    Impression: low risk for elective surgical procedure, proceed without further risk stratification, EKG shows sinus domingo without acute ST-T changes which is her baseline    Crohn's disease ([address]     Crohn's disease ([address] 6/28/2017    Impression: GI following    Degenerative arthritis 6/28/2017    Impression: trial of topical NSAID, if persists can consider an injection    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, 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    Fatigue 6/28/2017    Flank pain 6/28/2017    Impression: most likely secondary to Crohn's, not cardiac by history, EKG normal Status is Inactive      Gastrointestinal disorder     crones    Hair loss 6/28/2017    Impression: refer to derm    Hematochezia 6/28/2017    Status is Inactive    Herpes Dx 2015    no outbreak as of 5/2/16    Hyperlipidemia 6/28/2017    Hypertension     Hypokalemia 6/28/2017    MRSA (methicillin resistant staph aureus) culture positive \"many years ago\" as of 5/2/16    pt states + nasal swab \"many years ago\", Tx and no + since; UNCONFIRMED    Neck pain 6/28/2017    Impresssion: finish PT, continue HEP, pain med prn    Osteoporosis 6/28/2017    Otitis media, acute 6/28/2017    Impression: follow up next week, finish abx Status is Inactive    Pneumonia 6/28/2017    Impression:  Completed course of antibiotics, clinically resolved Status is Inactive    Traumatic hematoma of scalp 6/28/2017    Status is Inactive    Ulcerative colitis ([address] 6/28/2017    Vaginal yeast infection 6/28/2017    Impression, consider probiotic, check blood sugar, follow up GYN Status is Inactive     Past Surgical History:   Procedure Laterality Date    HX CATARACT REMOVAL Right 3/28/16    HX MOHS PROCEDURES Right     HX ORTHOPAEDIC Left     left knee sx    HX ORTHOPAEDIC Right     trigger release      HX TUBAL LIGATION       Allergies   Allergen Reactions    Latex Rash    Aldactone [Spironolactone] Hives    Chocolate [Cocoa] Rash    Cinnamon Rash    Hydrocodone Other (comments)     Vomiting, syncope    Lisinopril Hives     Current Outpatient Medications   Medication Sig Dispense Refill    meclizine (ANTIVERT) 25 mg tablet Take 1 Tablet by mouth three (3) times daily as needed for Dizziness for up to 10 days. 30 Tablet 0    valsartan (DIOVAN) 320 mg tablet TAKE 1 TABLET BY MOUTH EVERY DAY INDICATIONS: HIGH BLOOD PRESSURE 90 Tablet 1    Bifidobacterium Infantis (Align) 4 mg cap Take 1 Tablet by mouth daily.  azelastine (ASTELIN) 137 mcg (0.1 %) nasal spray 1 [place] by Both Nostrils route two (2) times a day.  Bystolic 10 mg tablet TAKE 1 TABLET DAILY 90 Tab 3    amLODIPine (NORVASC) 10 mg tablet Take 1 Tab by mouth daily. 90 Tab 3    loratadine 10 mg cap Take  by mouth daily.  EPINEPHrine (EPIPEN) 0.3 mg/0.3 mL injection INJECT CONTENTS OF 1 PEN AS NEEDED FOR ALLERGIC REACTION AS DIRECTED      nystatin-triamcinolone (MYCOLOG II) topical cream as needed.  ALLERGIST TRAY 1CC 27GX1/2\" 1 mL 27 x 1/2\" syrg every seven (7) days.   [address] [place] [address]  Nebulizer Accessories kit Use as directed 1 Kit 3    cycloSPORINE (RESTASIS) 0.05 % ophthalmic emulsion Administer 1 Drop to both eyes two (2) times a day.  albuterol (PROAIR HFA) 90 mcg/actuation inhaler Take  by inhalation as needed for Wheezing.  valACYclovir (VALTREX) 1 gram tablet Take 1,000 mg by mouth daily.  mometasone-formoterol (DULERA) 100-5 mcg/actuation HFA inhaler Take 2 puffs by inhalation two (2) times a day.  mesalamine EC (ASACOL) 400 mg EC tablet Take 800 mg by mouth three (3) times daily.  hyoscyamine SL (LEVSIN/SL) 0.125 mg SL tablet 0.125 mg by SubLINGual route every four (4) hours.  CALCIUM CARBONATE (CALCIUM 300 PO) Take 600 mg by mouth daily.  pirbuterol (MAXAIR AUTOHALER) 200 mcg/Inhalation inhaler Take 2 Puffs by inhalation four (4) times daily as needed.  Omeprazole delayed release (PRILOSEC D/R) 20 mg tablet Take 40 mg by mouth daily.  estradiol (VAGIFEM) 25 mcg vaginal tablet Insert 25 mcg into vagina two (2) days a week.  biotin 2,500 mcg Tab Take 5,000 mcg by mouth daily. Social History     Socioeconomic History    Marital status:    Tobacco Use    Smoking status: Never Smoker    Smokeless tobacco: Never Used   Substance and Sexual Activity    Alcohol use: No    Drug use: No    Sexual activity: Never     Family History   Problem Relation Age of Onset    Breast Cancer Maternal Aunt         ?  Heart Disease Mother     Asthma Father     Stroke Father        Review of Systems  Constitutional:  negative for fevers, chills, anorexia and weight loss  Eyes:    negative for visual disturbance and irritation  ENT:    negative for tinnitus,sore throat,nasal congestion,ear pains. hoarseness  Respiratory:     negative for cough, hemoptysis, dyspnea,wheezing  CV:    negative for chest pain, palpitations, lower extremity edema  GI:    negative for nausea, vomiting, diarrhea, abdominal pain,melena  Endo: negative for polyuria,polydipsia,polyphagia,heat intolerance  Genitourinary : negative for frequency, dysuria and hematuria  Integumentary: negative for rash and pruritus  Hematologic:   negative for easy bruising and gum/nose bleeding  Musculoskel:  negative for myalgias, arthralgias, back pain, muscle weakness, joint pain  Neurological:   negative for headaches, memory problems and gait   Behavl/Psych:  negative for feelings of anxiety, depression, mood changes  ROS otherwise negative      Objective:  Visit Vitals  /70 (BP 1 Location: Left arm, BP Patient Position: Sitting, BP Cuff Size: Large adult)   Pulse (!) 57   Resp 16   Ht 5' 2\" (1.575 m)   Wt 141 lb (64 kg)   SpO2 99%   BMI 25.79 kg/m²     Physical Exam:   General appearance - alert, well appearing, and in no distress  Mental status - alert, oriented to person, place, and time  EYE-[place], EOMI, fundi normal, corneas normal, no foreign bodies  ENT-ENT exam normal, no neck nodes or sinus tenderness  Nose - normal and patent, no erythema, discharge or polyps  Mouth - mucous membranes moist, pharynx normal without lesions  Neck - supple, no significant adenopathy   Chest - clear to auscultation, no wheezes, rales or rhonchi, symmetric air entry   Heart - normal rate, regular rhythm, normal S1, S2, no murmurs, rubs, clicks or gallops   Abdomen - soft, nontender, nondistended, no masses or organomegaly  Lymph- no adenopathy palpable  Ext-peripheral pulses normal, no pedal edema, no clubbing or cyanosis  Skin-Warm and dry. no hyperpigmentation, vitiligo, or suspicious lesions  Neuro -alert, oriented, normal speech, no focal findings or movement disorder noted, no nystagmus on lateral gaze, blood pressure lying down 144/80 with heart rate of 58, sitting upright blood pressure 138/70 with heart rate of 58      Assessment/Plan:  Diagnoses and all orders for this visit:    1. Vertigo    2.  Essential hypertension    Other orders  -     meclizine (ANTIVERT) 25 mg tablet; Take 1 Tablet by mouth three (3) times daily as needed for Dizziness for up to 10 days. ICD-10-CM ICD-9-CM    1. Vertigo  R42 780.4    2. Essential hypertension  I10 401.9        Plan:    The patient's blood pressure appears to be stable. She could still have side effects with her medication however this seems less likely. Her exam does not reveal any evidence for a vascular anomaly. There are no acute findings on her HEENT exam.  She will be placed on meclizine to take as needed if her symptoms persist.  If symptoms worsen or do not improve she will follow-up for further evaluation. We reviewed her previous labs which were done approximately a month ago which are stable. I have reviewed with the patient details of the assessment and plan and all questions were answered. Relevent patient education was performed. Verbal and/or written instructions (see AVS) provided. The most recent lab findings were reviewed with the patient. Plan was discussed with patient who verbally expressed understanding. An After Visit Summary was printed and given to the patient.     Trip Dumont MD

## 2022-01-13 NOTE — PROGRESS NOTES
1. Have you been to the ER, urgent care clinic since your last visit? Hospitalized since your last visit? No    2. Have you seen or consulted any other health care providers outside of the [address] [place] [address] since your last visit? Include any pap smears or colon screening.  No

## 2022-01-15 ENCOUNTER — HOSPITAL ENCOUNTER (EMERGENCY)
Age: 77
Discharge: HOME | End: 2022-01-15
Attending: EMERGENCY MEDICINE | Admitting: EMERGENCY MEDICINE
Payer: COMMERCIAL

## 2022-01-15 ENCOUNTER — APPOINTMENT (OUTPATIENT)
Dept: GENERAL RADIOLOGY | Age: 77
End: 2022-01-15
Attending: EMERGENCY MEDICINE
Payer: COMMERCIAL

## 2022-01-15 VITALS
OXYGEN SATURATION: 100 % | SYSTOLIC BLOOD PRESSURE: 210 MMHG | DIASTOLIC BLOOD PRESSURE: 90 MMHG | TEMPERATURE: 98.2 F | HEART RATE: 80 BPM | WEIGHT: 139.55 LBS | HEIGHT: 62 IN | BODY MASS INDEX: 25.68 KG/M2 | RESPIRATION RATE: 16 BRPM

## 2022-01-15 DIAGNOSIS — R07.9 CHEST PAIN, UNSPECIFIED TYPE: Primary | ICD-10-CM

## 2022-01-15 DIAGNOSIS — T14.8XXA MUSCLE STRAIN: ICD-10-CM

## 2022-01-15 LAB
ALBUMIN SERPL-MCNC: 3.8 G/DL (ref 3.5–5)
ALBUMIN/GLOB SERPL: 0.9 {RATIO} (ref 1.1–2.2)
ALP SERPL-CCNC: 80 U/L (ref 45–117)
ALT SERPL-CCNC: 21 U/L (ref 12–78)
ANION GAP SERPL CALC-SCNC: 4 MMOL/L (ref 5–15)
APPEARANCE UR: CLEAR
AST SERPL-CCNC: 16 U/L (ref 15–37)
BACTERIA URNS QL MICRO: ABNORMAL /HPF
BASOPHILS # BLD: 0 K/UL (ref 0–0.1)
BASOPHILS NFR BLD: 0 % (ref 0–1)
BILIRUB SERPL-MCNC: 0.2 MG/DL (ref 0.2–1)
BILIRUB UR QL: NEGATIVE
BUN SERPL-MCNC: 19 MG/DL (ref 6–20)
BUN/CREAT SERPL: 20 (ref 12–20)
CALCIUM SERPL-MCNC: 9 MG/DL (ref 8.5–10.1)
CHLORIDE SERPL-SCNC: 107 MMOL/L (ref 97–108)
CO2 SERPL-SCNC: 29 MMOL/L (ref 21–32)
COLOR UR: ABNORMAL
CREAT SERPL-MCNC: 0.97 MG/DL (ref 0.55–1.02)
DIFFERENTIAL METHOD BLD: ABNORMAL
EOSINOPHIL # BLD: 0.1 K/UL (ref 0–0.4)
EOSINOPHIL NFR BLD: 1 % (ref 0–7)
EPITH CASTS URNS QL MICRO: ABNORMAL /LPF
ERYTHROCYTE [DISTWIDTH] IN BLOOD BY AUTOMATED COUNT: 16.4 % (ref 11.5–14.5)
GLOBULIN SER CALC-MCNC: 4.1 G/DL (ref 2–4)
GLUCOSE SERPL-MCNC: 91 MG/DL (ref 65–100)
GLUCOSE UR STRIP.AUTO-MCNC: NEGATIVE MG/DL
HCT VFR BLD AUTO: 36.7 % (ref 35–47)
HGB BLD-MCNC: 11.4 G/DL (ref 11.5–16)
HGB UR QL STRIP: NEGATIVE
IMM GRANULOCYTES # BLD AUTO: 0 K/UL (ref 0–0.04)
IMM GRANULOCYTES NFR BLD AUTO: 0 % (ref 0–0.5)
KETONES UR QL STRIP.AUTO: NEGATIVE MG/DL
LEUKOCYTE ESTERASE UR QL STRIP.AUTO: ABNORMAL
LYMPHOCYTES # BLD: 1.9 K/UL (ref 0.8–3.5)
LYMPHOCYTES NFR BLD: 17 % (ref 12–49)
MCH RBC QN AUTO: 22.9 PG (ref 26–34)
MCHC RBC AUTO-ENTMCNC: 31.1 G/DL (ref 30–36.5)
MCV RBC AUTO: 73.7 FL (ref 80–99)
MONOCYTES # BLD: 0.9 K/UL (ref 0–1)
MONOCYTES NFR BLD: 8 % (ref 5–13)
NEUTS SEG # BLD: 7.9 K/UL (ref 1.8–8)
NEUTS SEG NFR BLD: 73 % (ref 32–75)
NITRITE UR QL STRIP.AUTO: NEGATIVE
NRBC # BLD: 0 K/UL (ref 0–0.01)
NRBC BLD-RTO: 0 PER 100 WBC
PH UR STRIP: 5.5 [PH] (ref 5–8)
PLATELET # BLD AUTO: 236 K/UL (ref 150–400)
PMV BLD AUTO: 13.3 FL (ref 8.9–12.9)
POTASSIUM SERPL-SCNC: 3.8 MMOL/L (ref 3.5–5.1)
PROT SERPL-MCNC: 7.9 G/DL (ref 6.4–8.2)
PROT UR STRIP-MCNC: NEGATIVE MG/DL
RBC # BLD AUTO: 4.98 M/UL (ref 3.8–5.2)
RBC #/AREA URNS HPF: ABNORMAL /HPF (ref 0–5)
SODIUM SERPL-SCNC: 140 MMOL/L (ref 136–145)
SP GR UR REFRACTOMETRY: <1.005 (ref 1–1.03)
TROPONIN-HIGH SENSITIVITY: 8 NG/L (ref 0–51)
TROPONIN-HIGH SENSITIVITY: 8 NG/L (ref 0–51)
UA: UC IF INDICATED,UAUC: ABNORMAL
UROBILINOGEN UR QL STRIP.AUTO: 0.2 EU/DL (ref 0.2–1)
WBC # BLD AUTO: 10.8 K/UL (ref 3.6–11)
WBC URNS QL MICRO: ABNORMAL /HPF (ref 0–4)

## 2022-01-15 PROCEDURE — 36415 COLL VENOUS BLD VENIPUNCTURE: CPT

## 2022-01-15 PROCEDURE — 71045 X-RAY EXAM CHEST 1 VIEW: CPT

## 2022-01-15 PROCEDURE — 93005 ELECTROCARDIOGRAM TRACING: CPT

## 2022-01-15 PROCEDURE — 81001 URINALYSIS AUTO W/SCOPE: CPT

## 2022-01-15 PROCEDURE — 84484 ASSAY OF TROPONIN QUANT: CPT

## 2022-01-15 PROCEDURE — 99283 EMERGENCY DEPT VISIT LOW MDM: CPT

## 2022-01-15 PROCEDURE — 85025 COMPLETE CBC W/AUTO DIFF WBC: CPT

## 2022-01-15 PROCEDURE — 80053 COMPREHEN METABOLIC PANEL: CPT

## 2022-01-15 NOTE — ED PROVIDER NOTES
EMERGENCY DEPARTMENT HISTORY AND PHYSICAL EXAM      Date: 1/15/2022  Patient Name: Umm Palomino  Patient Age and Sex: 77 y.o. female     History of Presenting Illness     Chief Complaint   Patient presents with    Flank Pain     pain in left hip and left flank \"I think it's gas\" hurts radiating down left leg and hurts when she bends down or chauncey over; pain onset today all day today.  Hip Pain       History Provided By: Patient    HPI: Umm Palomino  Is a 77year old history Crohn's disease, HTN presenting with chest pain and left hip pain. She reports since awaking this AM, she has had intermittent pressure like pain in her chest. It is substernal, non radiating and mild in severity. Not exertional. She denies associated dyspnea, nausea, vomiting, diaphoresis. She attributes the pain to gas build up. She took a fleets enema which produced a bowel movement, though without relief to her pain. Chest pressure resolved at time of presentation. She reports associated pain to her left hip she attributes to a likely pulled muscle which she has dealt with in the past.     There are no other complaints, changes, or physical findings at this time. PCP: Wing Schultz MD    No current facility-administered medications on file prior to encounter. Current Outpatient Medications on File Prior to Encounter   Medication Sig Dispense Refill    meclizine (ANTIVERT) 25 mg tablet Take 1 Tablet by mouth three (3) times daily as needed for Dizziness for up to 10 days. 30 Tablet 0    valsartan (DIOVAN) 320 mg tablet TAKE 1 TABLET BY MOUTH EVERY DAY INDICATIONS: HIGH BLOOD PRESSURE 90 Tablet 1    Bifidobacterium Infantis (Align) 4 mg cap Take 1 Tablet by mouth daily.  azelastine (ASTELIN) 137 mcg (0.1 %) nasal spray 1 [place] by Both Nostrils route two (2) times a day.  Bystolic 10 mg tablet TAKE 1 TABLET DAILY 90 Tab 3    amLODIPine (NORVASC) 10 mg tablet Take 1 Tab by mouth daily.  90 Tab 3    loratadine 10 mg cap Take  by mouth daily.  EPINEPHrine (EPIPEN) 0.3 mg/0.3 mL injection INJECT CONTENTS OF 1 PEN AS NEEDED FOR ALLERGIC REACTION AS DIRECTED      nystatin-triamcinolone (MYCOLOG II) topical cream as needed.  ALLERGIST TRAY 1CC 27GX1/2\" 1 mL 27 x 1/2\" syrg every seven (7) days. 99    Nebulizer Accessories kit Use as directed 1 Kit 3    cycloSPORINE (RESTASIS) 0.05 % ophthalmic emulsion Administer 1 Drop to both eyes two (2) times a day.  albuterol (PROAIR HFA) 90 mcg/actuation inhaler Take  by inhalation as needed for Wheezing.  valACYclovir (VALTREX) 1 gram tablet Take 1,000 mg by mouth daily.  mometasone-formoterol (DULERA) 100-5 mcg/actuation HFA inhaler Take 2 puffs by inhalation two (2) times a day.  mesalamine EC (ASACOL) 400 mg EC tablet Take 800 mg by mouth three (3) times daily.  hyoscyamine SL (LEVSIN/SL) 0.125 mg SL tablet 0.125 mg by SubLINGual route every four (4) hours.  CALCIUM CARBONATE (CALCIUM 300 PO) Take 600 mg by mouth daily.  pirbuterol (MAXAIR AUTOHALER) 200 mcg/Inhalation inhaler Take 2 Puffs by inhalation four (4) times daily as needed.  Omeprazole delayed release (PRILOSEC D/R) 20 mg tablet Take 40 mg by mouth daily.  estradiol (VAGIFEM) 25 mcg vaginal tablet Insert 25 mcg into vagina two (2) days a week.  biotin 2,500 mcg Tab Take 5,000 mcg by mouth daily.            Past History     Past Medical History:  Past Medical History:   Diagnosis Date    Allergic rhinitis 6/28/2017    Arthritis     Asthma     Asthma 6/28/2017    Impressio: continue nebs and Dulera, prednisone taper    Bradycardia 6/28/2017    Impression: asymptomatic, continue Bystolic    Cataract, bilateral 6/28/2017    Impression: low risk for elective surgical procedure, proceed without further risk stratification, EKG shows sinus domingo without acute ST-T changes which is her baseline    Crohn's disease ([address]     Crohn's disease ([address] 6/28/2017    Impression: GI following    Degenerative arthritis 6/28/2017    Impression: trial of topical NSAID, if persists can consider an injection    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, 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    Fatigue 6/28/2017    Flank pain 6/28/2017    Impression: most likely secondary to Crohn's, not cardiac by history, EKG normal Status is Inactive      Gastrointestinal disorder     crones    Hair loss 6/28/2017    Impression: refer to derm    Hematochezia 6/28/2017    Status is Inactive    Herpes Dx 2015    no outbreak as of 5/2/16    Hyperlipidemia 6/28/2017    Hypertension     Hypokalemia 6/28/2017    MRSA (methicillin resistant staph aureus) culture positive \"many years ago\" as of 5/2/16    pt states + nasal swab \"many years ago\", Tx and no + since; UNCONFIRMED    Neck pain 6/28/2017    Impresssion: finish PT, continue HEP, pain med prn    Osteoporosis 6/28/2017    Otitis media, acute 6/28/2017    Impression: follow up next week, finish abx Status is Inactive    Pneumonia 6/28/2017    Impression:  Completed course of antibiotics, clinically resolved Status is Inactive    Traumatic hematoma of scalp 6/28/2017    Status is Inactive    Ulcerative colitis ([address] 6/28/2017    Vaginal yeast infection 6/28/2017    Impression, consider probiotic, check blood sugar, follow up GYN Status is Inactive       Past Surgical History:  Past Surgical History:   Procedure Laterality Date    HX CATARACT REMOVAL Right 3/28/16    HX MOHS PROCEDURES Right     HX ORTHOPAEDIC Left     left knee sx    HX ORTHOPAEDIC Right     trigger release      HX TUBAL LIGATION         Family History:  Family History   Problem Relation Age of Onset    Breast Cancer Maternal Aunt         ?     Heart Disease Mother     Asthma Father     Stroke Father        Social History:  Social History     Tobacco Use    Smoking status: Never Smoker    Smokeless tobacco: Never Used   Substance Use Topics  Alcohol use: No    Drug use: No       Allergies: Allergies   Allergen Reactions    Latex Rash    Aldactone [Spironolactone] Hives    Chocolate [Cocoa] Rash    Cinnamon Rash    Hydrocodone Other (comments)     Vomiting, syncope    Lisinopril Hives         Review of Systems   Review of Systems   Constitutional: Negative for chills and fever. HENT: Negative for congestion and rhinorrhea. Respiratory: Negative for shortness of breath. Cardiovascular: Positive for chest pain. Gastrointestinal: Negative for abdominal pain, nausea and vomiting. Genitourinary: Positive for flank pain. Negative for dysuria and frequency. Musculoskeletal: Positive for arthralgias. Negative for myalgias. All other systems reviewed and are negative. Physical Exam   Physical Exam  Vitals and nursing note reviewed. Constitutional:       General: She is not in acute distress. Appearance: Normal appearance. She is not ill-appearing. HENT:      Head: Normocephalic. Mouth/Throat:      Mouth: Mucous membranes are moist.   Eyes:      Conjunctiva/sclera: Conjunctivae normal.   Cardiovascular:      Rate and Rhythm: Normal rate and regular rhythm. Pulses: Normal pulses. Pulmonary:      Effort: Pulmonary effort is normal.      Breath sounds: Normal breath sounds. Abdominal:      General: Abdomen is flat. Palpations: Abdomen is soft. Tenderness: There is no abdominal tenderness. Musculoskeletal:         General: No deformity. Comments: Mild pain with active flexion of L hip   Skin:     General: Skin is warm and dry. Neurological:      Mental Status: She is alert and oriented to person, place, and time. Mental status is at baseline. Psychiatric:         Behavior: Behavior normal.         Thought Content:  Thought content normal.          Diagnostic Study Results   Labs  Recent Results (from the past 12 hour(s))   EKG, 12 LEAD, INITIAL    Collection Time: 01/15/22  2:38 PM   Result Value Ref Range    Ventricular Rate 65 BPM    Atrial Rate 65 BPM    P-R Interval 180 ms    QRS Duration 76 ms    Q-T Interval 416 ms    QTC Calculation (Bezet) 432 ms    Calculated P Axis 79 degrees    Calculated R Axis 35 degrees    Calculated T Axis 102 degrees    Diagnosis       Normal sinus rhythm  Abnormal QRS-T angle, consider primary T wave abnormality  When compared with ECG of 17-MAR-2021 12:11,  No significant change was found     URINALYSIS W/ REFLEX CULTURE    Collection Time: 01/15/22  2:52 PM    Specimen: Urine   Result Value Ref Range    Color YELLOW/STRAW      Appearance CLEAR CLEAR      Specific gravity <1.005 1.003 - 1.030    pH (UA) 5.5 5.0 - 8.0      Protein Negative NEG mg/dL    Glucose Negative NEG mg/dL    Ketone Negative NEG mg/dL    Bilirubin Negative NEG      Blood Negative NEG      Urobilinogen 0.2 0.2 - 1.0 EU/dL    Nitrites Negative NEG      Leukocyte Esterase SMALL (A) NEG      WBC 5-10 0 - 4 /hpf    RBC 5-10 0 - 5 /hpf    Epithelial cells FEW FEW /lpf    Bacteria 1+ (A) NEG /hpf    UA:UC IF INDICATED CULTURE NOT INDICATED BY UA RESULT CNI     CBC WITH AUTOMATED DIFF    Collection Time: 01/15/22  2:52 PM   Result Value Ref Range    WBC 10.8 3.6 - 11.0 K/uL    RBC 4.98 3.80 - 5.20 M/uL    HGB 11.4 (L) 11.5 - 16.0 g/dL    HCT 36.7 35.0 - 47.0 %    MCV 73.7 (L) 80.0 - 99.0 FL    MCH 22.9 (L) 26.0 - 34.0 PG    MCHC 31.1 30.0 - 36.5 g/dL    RDW 16.4 (H) 11.5 - 14.5 %    PLATELET 054 988 - 938 K/uL    MPV 13.3 (H) 8.9 - 12.9 FL    NRBC 0.0 0  WBC    ABSOLUTE NRBC 0.00 0.00 - 0.01 K/uL    NEUTROPHILS 73 32 - 75 %    LYMPHOCYTES 17 12 - 49 %    MONOCYTES 8 5 - 13 %    EOSINOPHILS 1 0 - 7 %    BASOPHILS 0 0 - 1 %    IMMATURE GRANULOCYTES 0 0.0 - 0.5 %    ABS. NEUTROPHILS 7.9 1.8 - 8.0 K/UL    ABS. LYMPHOCYTES 1.9 0.8 - 3.5 K/UL    ABS. MONOCYTES 0.9 0.0 - 1.0 K/UL    ABS. EOSINOPHILS 0.1 0.0 - 0.4 K/UL    ABS. BASOPHILS 0.0 0.0 - 0.1 K/UL    ABS. IMM.  GRANS. 0.0 0.00 - 0.04 K/UL    DF AUTOMATED     METABOLIC PANEL, COMPREHENSIVE    Collection Time: 01/15/22  2:52 PM   Result Value Ref Range    Sodium 140 136 - 145 mmol/L    Potassium 3.8 3.5 - 5.1 mmol/L    Chloride 107 97 - 108 mmol/L    CO2 29 21 - 32 mmol/L    Anion gap 4 (L) 5 - 15 mmol/L    Glucose 91 65 - 100 mg/dL    BUN 19 6 - 20 MG/DL    Creatinine 0.97 0.55 - 1.02 MG/DL    BUN/Creatinine ratio 20 12 - 20      GFR est AA >60 >60 ml/min/1.73m2    GFR est non-AA 56 (L) >60 ml/min/1.73m2    Calcium 9.0 8.5 - 10.1 MG/DL    Bilirubin, total 0.2 0.2 - 1.0 MG/DL    ALT (SGPT) 21 12 - 78 U/L    AST (SGOT) 16 15 - 37 U/L    Alk. phosphatase 80 45 - 117 U/L    Protein, total 7.9 6.4 - 8.2 g/dL    Albumin 3.8 3.5 - 5.0 g/dL    Globulin 4.1 (H) 2.0 - 4.0 g/dL    A-G Ratio 0.9 (L) 1.1 - 2.2     TROPONIN-HIGH SENSITIVITY    Collection Time: 01/15/22  2:52 PM   Result Value Ref Range    Troponin-High Sensitivity 8 0 - 51 ng/L   TROPONIN-HIGH SENSITIVITY    Collection Time: 01/15/22  5:08 PM   Result Value Ref Range    Troponin-High Sensitivity 8 0 - 51 ng/L       Radiologic Studies -   XR CHEST PORT   Final Result   No Acute Disease. CT Results  (Last 48 hours)    None        CXR Results  (Last 48 hours)               01/15/22 1609  XR CHEST PORT Final result    Impression:  No Acute Disease. Narrative:  EXAM: Portable CXR. 1601 hours. INDICATION: chest pain       FINDINGS:   The lungs appear clear. Heart is normal in size. There is no pulmonary edema. There is no evident pneumothorax or pleural effusion. Medical Decision Making   I am the first provider for this patient. I reviewed the vital signs, available nursing notes, past medical history, past surgical history, family history and social history. Vital Signs-Reviewed the patient's vital signs.   Patient Vitals for the past 12 hrs:   Temp Pulse Resp BP SpO2   01/15/22 1444    (!) 210/90    01/15/22 1436 98.2 °F (36.8 °C) 80 16 (!) 176/140 100 %     Records Reviewed: Nursing Notes and Old Medical Records    Provider Notes (Medical Decision Making):   Differential includes ACS, Crohns esophagitis, pneumonia, osteoarthritis    Will obtain CXR, EKG, troponin, basic labwork. Lower suspicion esophagitis by history. ED Course:   Initial assessment performed. The patients presenting problems have been discussed, and they are in agreement with the care plan formulated and outlined with them. I have encouraged them to ask questions as they arise throughout their visit. ED Course as of 01/15/22 2051   Sat Romel 15, 2022   1558 Will repeat troponin in 1 hour [WB]   1558 UA without evidence of infection; no hematuria [WB]   1558 CMP normal [WB]   1558 Hg 11.4 [WB]   1745 Initial troponin 8, repeat troponin [WB]   1817 Repeat troponin normal, will discharge [WB]      ED Course User Index  [WB] Emanuel Yusuf MD     Disposition:  Discharge Note:  The patient has been re-evaluated and is ready for discharge. Reviewed available results with patient. Counseled patient on diagnosis and care plan. Patient has expressed understanding, and all questions have been answered. Patient agrees with plan and agrees to follow up as recommended, or to return to the ED if their symptoms worsen. Discharge instructions have been provided and explained to the patient, along with reasons to return to the ED. PLAN:  Discharge Medication List as of 1/15/2022  6:19 PM        2. Follow-up Information     Follow up With Specialties Details Why Isabella Bear MD Internal Medicine Schedule an appointment as soon as possible for a visit   [address]      Mena Escamilla MD Cardiology, Internal Medicine Schedule an appointment as soon as possible for a visit   [address] [facility] [address]  [address] [phone]          3. Return to ED if worse     Diagnosis     Clinical Impression:   1.  Chest pain, unspecified type    2. Muscle strain        Attestations:    Mary Membreno M.D. Please note that this dictation was completed with [employer], the computer voice recognition software. Quite often unanticipated grammatical, syntax, homophones, and other interpretive errors are inadvertently transcribed by the computer software. Please disregard these errors. Please excuse any errors that have escaped final proofreading. Thank you.

## 2022-01-15 NOTE — ED NOTES
Rick Fields RN reviewed discharge instructions with the patient. The patient verbalized understanding. All questions and concerns were addressed. The patient is discharged via wheelchair in the care of family members with instructions and prescriptions in hand. Pt is alert and oriented x 4. Respirations are clear and unlabored.

## 2022-01-15 NOTE — DISCHARGE INSTRUCTIONS
Please follow-up with your primary care physician to discuss further testing regarding your episode of chest pain.

## 2022-01-16 LAB
ATRIAL RATE: 65 BPM
CALCULATED P AXIS, ECG09: 79 DEGREES
CALCULATED R AXIS, ECG10: 35 DEGREES
CALCULATED T AXIS, ECG11: 102 DEGREES
DIAGNOSIS, 93000: NORMAL
P-R INTERVAL, ECG05: 180 MS
Q-T INTERVAL, ECG07: 416 MS
QRS DURATION, ECG06: 76 MS
QTC CALCULATION (BEZET), ECG08: 432 MS
VENTRICULAR RATE, ECG03: 65 BPM

## 2022-02-01 RX ORDER — NEBIVOLOL HYDROCHLORIDE 10 MG/1
10 TABLET ORAL DAILY
Qty: 90 TABLET | Refills: 3 | Status: SHIPPED | OUTPATIENT
Start: 2022-02-01 | End: 2022-04-27 | Stop reason: SDUPTHER

## 2022-02-01 NOTE — TELEPHONE ENCOUNTER
Last Refill: 04/12/21  Last Visit: 1/13/2022   Next Visit: 5/18/2022    Requested Prescriptions     Pending Prescriptions Disp Refills    Bystolic 10 mg tablet 90 Tablet 3     Sig: Take 1 Tablet by mouth daily.

## 2022-02-02 ENCOUNTER — OFFICE VISIT (OUTPATIENT)
Dept: CARDIOLOGY CLINIC | Age: 77
End: 2022-02-02
Payer: COMMERCIAL

## 2022-02-02 VITALS
BODY MASS INDEX: 25.34 KG/M2 | RESPIRATION RATE: 16 BRPM | WEIGHT: 137.7 LBS | HEART RATE: 63 BPM | SYSTOLIC BLOOD PRESSURE: 184 MMHG | DIASTOLIC BLOOD PRESSURE: 80 MMHG | OXYGEN SATURATION: 99 % | HEIGHT: 62 IN

## 2022-02-02 DIAGNOSIS — I10 ESSENTIAL HYPERTENSION: ICD-10-CM

## 2022-02-02 DIAGNOSIS — R42 DIZZINESS: Primary | ICD-10-CM

## 2022-02-02 PROCEDURE — G8427 DOCREV CUR MEDS BY ELIG CLIN: HCPCS | Performed by: INTERNAL MEDICINE

## 2022-02-02 PROCEDURE — 1090F PRES/ABSN URINE INCON ASSESS: CPT | Performed by: INTERNAL MEDICINE

## 2022-02-02 PROCEDURE — G8754 DIAS BP LESS 90: HCPCS | Performed by: INTERNAL MEDICINE

## 2022-02-02 PROCEDURE — 3017F COLORECTAL CA SCREEN DOC REV: CPT | Performed by: INTERNAL MEDICINE

## 2022-02-02 PROCEDURE — G8753 SYS BP > OR = 140: HCPCS | Performed by: INTERNAL MEDICINE

## 2022-02-02 PROCEDURE — G8536 NO DOC ELDER MAL SCRN: HCPCS | Performed by: INTERNAL MEDICINE

## 2022-02-02 PROCEDURE — 1101F PT FALLS ASSESS-DOCD LE1/YR: CPT | Performed by: INTERNAL MEDICINE

## 2022-02-02 PROCEDURE — G8419 CALC BMI OUT NRM PARAM NOF/U: HCPCS | Performed by: INTERNAL MEDICINE

## 2022-02-02 PROCEDURE — 99204 OFFICE O/P NEW MOD 45 MIN: CPT | Performed by: INTERNAL MEDICINE

## 2022-02-02 PROCEDURE — 93000 ELECTROCARDIOGRAM COMPLETE: CPT | Performed by: INTERNAL MEDICINE

## 2022-02-02 PROCEDURE — G8510 SCR DEP NEG, NO PLAN REQD: HCPCS | Performed by: INTERNAL MEDICINE

## 2022-02-02 RX ORDER — HYDRALAZINE HYDROCHLORIDE 50 MG/1
50 TABLET, FILM COATED ORAL 3 TIMES DAILY
Qty: 270 TABLET | Refills: 3 | Status: SHIPPED | OUTPATIENT
Start: 2022-02-02 | End: 2022-07-19

## 2022-02-02 NOTE — LETTER
2/2/2022    Patient: Steffi Mccurdy   YOB: 1945   Date of Visit: 2/2/2022     MD Alicia Loza    Dear Magno Mancia MD,      Thank you for referring Ms. Ana Davis to [address] [place] [address] for evaluation. My notes for this consultation are attached. If you have questions, please do not hesitate to call me. I look forward to following your patient along with you.       Sincerely,    Gerald Johnson MD

## 2022-02-02 NOTE — PROGRESS NOTES
Chief Complaint   Patient presents with    Dizziness     Familly of Heart Attack - Mother and Sister;  c/o Lightheadedness x couple of weeks Hx of Vertigo         Vitals:    02/02/22 1016 02/02/22 1040 02/02/22 1041   BP: (!) 212/80 (!) 210/90 (!) 184/80   BP 1 Location: Right arm Right arm Right arm   BP Patient Position: Supine Sitting Standing   BP Cuff Size: Adult Adult Adult   Pulse: (!) 58 (!) 58 63   Resp: 16     Height: 5' 2\" (1.575 m)     Weight: 137 lb 11.2 oz (62.5 kg)     SpO2: 99%           1. Have you been to the ER, urgent care clinic since your last visit? Hospitalized since your last visit? Yes When: 01/15/2022 Where: [facility] Reason for visit: Gas/Chest Pain     2. Have you seen or consulted any other health care providers outside of the [address] [place] [address] since your last visit? Include any pap smears or colon screening.  No

## 2022-02-02 NOTE — PROGRESS NOTES
Verónica Rob, [facility]    Subjective/HPI:     Catracho Willams is a 77 y.o. female is here to establish care. She has a PMHx of Crohn's disease, HTN, asthma. Here with concerns for chest pressure and dizziness. She reports significant sensation of gas in her chest and back. Symptoms can come on at any time, not always associated with exertion. It is relieved sometimes with drinking ginger ale and burping, but then the gas returns. Reports dizziness with positional changes. Felt to be vertigo and given a rx for meclizine, but has not had the opportunity to take this. Denies shortness of breath, orthopnea, PND or edema. Denies palpitation symptoms. BP at home has been elevated. AM readings around 140-150s, and by evening it is 569-255I systolic. She has been taking half dose of her BP medications, and then the second half a few hours later because she is afraid her blood pressure will bottom out. Has significant family history of sudden cardiac arrest.  Her mother and sister both unexpectedly  of sudden cardiac arrest in their 76s. Current Outpatient Medications on File Prior to Visit   Medication Sig Dispense Refill    Bystolic 10 mg tablet Take 1 Tablet by mouth daily. 90 Tablet 3    valsartan (DIOVAN) 320 mg tablet TAKE 1 TABLET BY MOUTH EVERY DAY INDICATIONS: HIGH BLOOD PRESSURE 90 Tablet 1    Bifidobacterium Infantis (Align) 4 mg cap Take 1 Tablet by mouth daily.  azelastine (ASTELIN) 137 mcg (0.1 %) nasal spray 1 [place] by Both Nostrils route two (2) times a day.  amLODIPine (NORVASC) 10 mg tablet Take 1 Tab by mouth daily. 90 Tab 3    EPINEPHrine (EPIPEN) 0.3 mg/0.3 mL injection INJECT CONTENTS OF 1 PEN AS NEEDED FOR ALLERGIC REACTION AS DIRECTED      nystatin-triamcinolone (MYCOLOG II) topical cream as needed.  ALLERGIST TRAY 1CC 27GX1/2\" 1 mL 27 x 1/2\" syrg every seven (7) days.   99    Nebulizer Accessories kit Use as directed 1 Kit 3    cycloSPORINE (RESTASIS) 0.05 % ophthalmic emulsion Administer 1 Drop to both eyes two (2) times a day.  albuterol (PROAIR HFA) 90 mcg/actuation inhaler Take  by inhalation as needed for Wheezing.  valACYclovir (VALTREX) 1 gram tablet Take 1,000 mg by mouth daily.  mometasone-formoterol (DULERA) 100-5 mcg/actuation HFA inhaler Take 2 puffs by inhalation two (2) times a day.  mesalamine EC (ASACOL) 400 mg EC tablet Take 800 mg by mouth three (3) times daily.  hyoscyamine SL (LEVSIN/SL) 0.125 mg SL tablet 0.125 mg by SubLINGual route every four (4) hours.  CALCIUM CARBONATE (CALCIUM 300 PO) Take 600 mg by mouth daily.  pirbuterol (MAXAIR AUTOHALER) 200 mcg/Inhalation inhaler Take 2 Puffs by inhalation four (4) times daily as needed.  Omeprazole delayed release (PRILOSEC D/R) 20 mg tablet Take 40 mg by mouth daily.  estradiol (VAGIFEM) 25 mcg vaginal tablet Insert 25 mcg into vagina two (2) days a week.  biotin 2,500 mcg Tab Take 5,000 mcg by mouth daily.  [DISCONTINUED] loratadine 10 mg cap Take  by mouth daily. (Patient not taking: Reported on 2/2/2022)       No current facility-administered medications on file prior to visit. Review of Symptoms:    Review of Systems   Constitutional: Negative for chills, fever and weight loss. HENT: Negative for nosebleeds. Eyes: Negative for blurred vision and double vision. Respiratory: Negative for cough, shortness of breath and wheezing. Cardiovascular: Positive for chest pain. Negative for palpitations, orthopnea, leg swelling and PND. Skin: Negative for rash. Neurological: Positive for dizziness. Negative for loss of consciousness. Physical Exam:      General: Well developed, in no acute distress, cooperative and alert  Heart:  reg rate and rhythm; normal S1/S2; no murmurs, no gallops or rubs.    Respiratory: Clear bilaterally x 4, no wheezing or rales  Extremities:  Normal cap refill, no cyanosis, atraumatic. No edema. Vascular: 2+ pulses symmetric in all extremities    Vitals:    02/02/22 1016 02/02/22 1040 02/02/22 1041   BP: (!) 212/80 (!) 210/90 (!) 184/80   BP 1 Location: Right arm Right arm Right arm   BP Patient Position: Supine Sitting Standing   BP Cuff Size: Adult Adult Adult   Pulse: (!) 58 (!) 58 63   Resp: 16     Height: 5' 2\" (1.575 m)     Weight: 137 lb 11.2 oz (62.5 kg)     SpO2: 99%         ECG done today shows sinus bradycardia     Assessment:       ICD-10-CM ICD-9-CM    1. Dizziness  R42 780.4    2. Essential hypertension  I10 401.9 AMB POC EKG ROUTINE W/ 12 LEADS, INTER & REP        Plan:     1. Dizziness  Possibly due to HTN  Will get echocardiogram, check lexiscan stress test    2. Essential hypertension  BP at home 410V-919S systolic  Currently on Bystolic, valsartan, and amlodipine at max doses  Will start hydralazine 50 mg TID  Consider renal artery duplex to r/o PRIMITIVO if BP is not improved    3. Chest pressure  Atypical symptoms, multiple risk factors  Will obtain lexiscan stress test    F/u with Dr. Vipul Cobos in 2-3 weeks    Karri Tracey NP       [facility]             Patient seen, examined by me personally. Plan discussed as detailed. Agree with note as outlined by  NP with modifications as noted. My independent physical exam reveals : Physical Exam  Vitals and nursing note reviewed. Cardiovascular:      Rate and Rhythm: Regular rhythm. Heart sounds: Normal heart sounds. Pulmonary:      Breath sounds: Normal breath sounds. Musculoskeletal:      Right lower leg: No edema. Left lower leg: No edema. Skin:     General: Skin is warm. Neurological:      Mental Status: She is alert and oriented to person, place, and time. Psychiatric:         Mood and Affect: Mood normal.          No additional findings noted. Agree with plan as outlined above with modifications as noted. BP poorly controlled, on triple therapy.  Add hydralazine, consider HCTZ. May need to look at secondary causes if not already done.     Manisha Renner MD

## 2022-02-24 DIAGNOSIS — R42 DIZZINESS: ICD-10-CM

## 2022-02-24 DIAGNOSIS — R07.9 CHEST PAIN, UNSPECIFIED TYPE: Primary | ICD-10-CM

## 2022-03-03 ENCOUNTER — ANCILLARY PROCEDURE (OUTPATIENT)
Dept: CARDIOLOGY CLINIC | Age: 77
End: 2022-03-03
Payer: COMMERCIAL

## 2022-03-03 ENCOUNTER — ANCILLARY PROCEDURE (OUTPATIENT)
Dept: CARDIOLOGY CLINIC | Age: 77
End: 2022-03-03

## 2022-03-03 VITALS
HEIGHT: 62 IN | WEIGHT: 137 LBS | BODY MASS INDEX: 25.21 KG/M2 | SYSTOLIC BLOOD PRESSURE: 184 MMHG | DIASTOLIC BLOOD PRESSURE: 80 MMHG

## 2022-03-03 VITALS
DIASTOLIC BLOOD PRESSURE: 82 MMHG | BODY MASS INDEX: 25.76 KG/M2 | SYSTOLIC BLOOD PRESSURE: 158 MMHG | WEIGHT: 140 LBS | HEIGHT: 62 IN

## 2022-03-03 DIAGNOSIS — R42 DIZZINESS: ICD-10-CM

## 2022-03-03 DIAGNOSIS — R07.9 CHEST PAIN, UNSPECIFIED TYPE: ICD-10-CM

## 2022-03-03 PROCEDURE — 93015 CV STRESS TEST SUPVJ I&R: CPT | Performed by: INTERNAL MEDICINE

## 2022-03-03 PROCEDURE — 78452 HT MUSCLE IMAGE SPECT MULT: CPT | Performed by: INTERNAL MEDICINE

## 2022-03-03 PROCEDURE — A9502 TC99M TETROFOSMIN: HCPCS | Performed by: INTERNAL MEDICINE

## 2022-03-03 PROCEDURE — 93306 TTE W/DOPPLER COMPLETE: CPT | Performed by: INTERNAL MEDICINE

## 2022-03-04 ENCOUNTER — TELEPHONE (OUTPATIENT)
Dept: CARDIOLOGY CLINIC | Age: 77
End: 2022-03-04

## 2022-03-04 LAB
ECHO AO ASC DIAM: 3.1 CM
ECHO AO ASCENDING AORTA INDEX: 1.9 CM/M2
ECHO AO ROOT DIAM: 2.5 CM
ECHO AO ROOT INDEX: 1.53 CM/M2
ECHO AV AREA PEAK VELOCITY: 2 CM2
ECHO AV AREA/BSA PEAK VELOCITY: 1.2 CM2/M2
ECHO AV PEAK GRADIENT: 10 MMHG
ECHO AV PEAK VELOCITY: 1.6 M/S
ECHO LA DIAMETER INDEX: 2.21 CM/M2
ECHO LA DIAMETER: 3.6 CM
ECHO LA TO AORTIC ROOT RATIO: 1.44
ECHO LA VOL 2C: 46 ML (ref 22–52)
ECHO LA VOL 4C: 72 ML (ref 22–52)
ECHO LA VOL BP: 60 ML (ref 22–52)
ECHO LA VOL BP: 60 ML (ref 22–52)
ECHO LA VOLUME AREA LENGTH: 63 ML
ECHO LA VOLUME INDEX A2C: 28 ML/M2 (ref 16–34)
ECHO LA VOLUME INDEX A4C: 44 ML/M2 (ref 16–34)
ECHO LA VOLUME INDEX AREA LENGTH: 39 ML/M2 (ref 16–34)
ECHO LV E' LATERAL VELOCITY: 11 CM/S
ECHO LV E' SEPTAL VELOCITY: 8 CM/S
ECHO LV FRACTIONAL SHORTENING: 38 % (ref 28–44)
ECHO LV INTERNAL DIMENSION DIASTOLE INDEX: 3.07 CM/M2
ECHO LV INTERNAL DIMENSION DIASTOLIC: 5 CM (ref 3.9–5.3)
ECHO LV INTERNAL DIMENSION SYSTOLIC INDEX: 1.9 CM/M2
ECHO LV INTERNAL DIMENSION SYSTOLIC: 3.1 CM
ECHO LV IVSD: 0.7 CM (ref 0.6–0.9)
ECHO LV MASS 2D: 114.7 G (ref 67–162)
ECHO LV MASS INDEX 2D: 70.4 G/M2 (ref 43–95)
ECHO LV POSTERIOR WALL DIASTOLIC: 0.7 CM (ref 0.6–0.9)
ECHO LV RELATIVE WALL THICKNESS RATIO: 0.28
ECHO LVOT AREA: 3.1 CM2
ECHO LVOT CARDIAC OUTPUT: 2.9 LITER/MINUTE
ECHO LVOT DIAM: 2 CM
ECHO LVOT MEAN GRADIENT: 2 MMHG
ECHO LVOT PEAK GRADIENT: 4 MMHG
ECHO LVOT PEAK GRADIENT: 4 MMHG
ECHO LVOT PEAK VELOCITY: 1 M/S
ECHO LVOT PEAK VELOCITY: 1 M/S
ECHO LVOT STROKE VOLUME INDEX: 49.1 ML/M2
ECHO LVOT SV: 80.1 ML
ECHO LVOT VTI: 25.5 CM
ECHO MV A VELOCITY: 0.64 M/S
ECHO MV E DECELERATION TIME (DT): 268.6 MS
ECHO MV E VELOCITY: 0.79 M/S
ECHO MV E/A RATIO: 1.23
ECHO MV E/E' LATERAL: 7.18
ECHO MV E/E' RATIO (AVERAGED): 8.53
ECHO MV E/E' SEPTAL: 9.88
ECHO RV TAPSE: 2.7 CM (ref 1.5–2)
NUC STRESS EJECTION FRACTION: 66 %
STRESS BASELINE DIAS BP: 82 MMHG
STRESS BASELINE HR: 57 BPM
STRESS BASELINE ST DEPRESSION: 0 MM
STRESS BASELINE SYS BP: 158 MMHG
STRESS PEAK DIAS BP: 82 MMHG
STRESS PEAK SYS BP: 158 MMHG
STRESS PERCENT HR ACHIEVED: 61 %
STRESS POST PEAK HR: 88 BPM
STRESS RATE PRESSURE PRODUCT: NORMAL BPM*MMHG
STRESS ST DEPRESSION: 0 MM
STRESS TARGET HR: 145 BPM

## 2022-03-04 NOTE — TELEPHONE ENCOUNTER
Spoke with patient. Verified with two patient identifiers. Advised pt per [employer] NP,  echocardiogram is normal, ejection fraction is 55-60%. No concern for heart failure at this time. Valves working appropriately and stress test is normal.  Low concern for significant blockages in the heart arteries at this time. Patient verbalized understanding.

## 2022-03-04 NOTE — TELEPHONE ENCOUNTER
----- Message from Barbara Lozano NP sent at 3/4/2022 11:58 AM EST -----  Please call the patient and inform that echocardiogram is normal, ejection fraction is 55-60%. No concern for heart failure at this time. Valves working appropriately.     Thanks,  [employer]

## 2022-03-04 NOTE — PROGRESS NOTES
Please call the patient and inform that echocardiogram is normal, ejection fraction is 55-60%. No concern for heart failure at this time. Valves working appropriately.     Thanks,  Karla Solitario

## 2022-03-19 PROBLEM — E78.5 HYPERLIPIDEMIA: Status: ACTIVE | Noted: 2017-06-28

## 2022-03-19 PROBLEM — H26.9 CATARACT, BILATERAL: Status: ACTIVE | Noted: 2017-06-28

## 2022-03-19 PROBLEM — M81.0 OSTEOPOROSIS: Status: ACTIVE | Noted: 2017-06-28

## 2022-03-19 PROBLEM — M19.90 DEGENERATIVE ARTHRITIS: Status: ACTIVE | Noted: 2017-06-28

## 2022-03-19 PROBLEM — E78.2 MIXED HYPERLIPIDEMIA: Status: ACTIVE | Noted: 2017-07-13

## 2022-03-19 PROBLEM — K92.1 HEMATOCHEZIA: Status: ACTIVE | Noted: 2017-06-28

## 2022-03-19 PROBLEM — J45.909 ASTHMA: Status: ACTIVE | Noted: 2017-06-28

## 2022-03-19 PROBLEM — R00.1 BRADYCARDIA: Status: ACTIVE | Noted: 2017-06-28

## 2022-03-19 PROBLEM — K50.90 CROHN'S DISEASE (HCC): Status: ACTIVE | Noted: 2017-06-28

## 2022-03-19 PROBLEM — J45.40 MODERATE PERSISTENT ASTHMA WITHOUT COMPLICATION: Status: ACTIVE | Noted: 2017-07-13

## 2022-03-19 PROBLEM — J30.9 ALLERGIC RHINITIS: Status: ACTIVE | Noted: 2017-06-28

## 2022-03-20 PROBLEM — I10 ESSENTIAL HYPERTENSION: Status: ACTIVE | Noted: 2017-07-13

## 2022-03-20 PROBLEM — K51.20 ULCERATIVE PROCTITIS WITHOUT COMPLICATION (HCC): Status: ACTIVE | Noted: 2017-07-13

## 2022-03-20 PROBLEM — J18.9 PNEUMONIA: Status: ACTIVE | Noted: 2017-06-28

## 2022-03-22 NOTE — PROGRESS NOTES
[address], [place], [address] [facility]  [phone]     Subjective:      Christiane Valencia is a 77 y.o. female is here for routine f/u. She has a PMHx of Crohn's disease, HTN, asthma and HLD  Initially seen by us in 1/2022 with concerns for chest pressure and dizziness  Cardiac work up negative (echo and stress). We started her on Hydralazine at that [address] Today, feeling well, bp much better. The patient denies chest pain/ shortness of breath, orthopnea, PND, LE edema, palpitations, syncope, or presyncope.        Patient Active Problem List    Diagnosis Date Noted    Moderate persistent asthma without complication 33/47/0571    Ulcerative proctitis without complication ([address] 98/11/9223    Essential hypertension 07/13/2017    Mixed hyperlipidemia 07/13/2017    Allergic rhinitis 06/28/2017    Bradycardia 06/28/2017    Cataract, bilateral 06/28/2017    Degenerative arthritis 06/28/2017    Crohn's disease ([address] 06/28/2017    Hyperlipidemia 06/28/2017    Osteoporosis 06/28/2017    Asthma 06/28/2017    Hematochezia 06/28/2017    Pneumonia 06/28/2017      Crystal Leong MD  Past Medical History:   Diagnosis Date    Allergic rhinitis 6/28/2017    Arthritis     Asthma     Asthma 6/28/2017    Impressio: continue nebs and Dulera, prednisone taper    Bradycardia 6/28/2017    Impression: asymptomatic, continue Bystolic    Cataract, bilateral 6/28/2017    Impression: low risk for elective surgical procedure, proceed without further risk stratification, EKG shows sinus domingo without acute ST-T changes which is her baseline    Crohn's disease ([address]     Crohn's disease ([address] 6/28/2017    Impression: GI following    Degenerative arthritis 6/28/2017    Impression: trial of topical NSAID, if persists can consider an injection    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, 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    Fatigue 6/28/2017    Flank pain 6/28/2017    Impression: most likely secondary to Crohn's, not cardiac by history, EKG normal Status is Inactive      Gastrointestinal disorder     crones    Hair loss 6/28/2017    Impression: refer to derm    Hematochezia 6/28/2017    Status is Inactive    Herpes Dx 2015    no outbreak as of 5/2/16    Hyperlipidemia 6/28/2017    Hypertension     Hypokalemia 6/28/2017    MRSA (methicillin resistant staph aureus) culture positive \"many years ago\" as of 5/2/16    pt states + nasal swab \"many years ago\", Tx and no + since; UNCONFIRMED    Neck pain 6/28/2017    Impresssion: finish PT, continue HEP, pain med prn    Osteoporosis 6/28/2017    Otitis media, acute 6/28/2017    Impression: follow up next week, finish abx Status is Inactive    Pneumonia 6/28/2017    Impression:  Completed course of antibiotics, clinically resolved Status is Inactive    Traumatic hematoma of scalp 6/28/2017    Status is Inactive    Ulcerative colitis ([address] 6/28/2017    Vaginal yeast infection 6/28/2017    Impression, consider probiotic, check blood sugar, follow up GYN Status is Inactive      Past Surgical History:   Procedure Laterality Date    HX CATARACT REMOVAL Right 3/28/16    HX MOHS PROCEDURES Right     HX ORTHOPAEDIC Left     left knee sx    HX ORTHOPAEDIC Right     trigger release      HX TUBAL LIGATION       Allergies   Allergen Reactions    Latex Rash    Aldactone [Spironolactone] Hives    Chocolate [Cocoa] Rash    Cinnamon Rash    Hydrocodone Other (comments)     Vomiting, syncope    Lisinopril Hives      Family History   Problem Relation Age of Onset    Breast Cancer Maternal Aunt         ?     Heart Disease Mother     Asthma Father     Stroke Father       Social History     Socioeconomic History    Marital status:      Spouse name: Not on file    Number of children: Not on file    Years of education: Not on file    Highest education level: Not on file   Occupational History    Not on file   Tobacco Use    Smoking status: Never Smoker    Smokeless tobacco: Never Used   Substance and Sexual Activity    Alcohol use: No    Drug use: No    Sexual activity: Never   Other Topics Concern    Not on file   Social History Narrative    Not on file     Social Determinants of Health     Financial Resource Strain:     Difficulty of Paying Living Expenses: Not on file   Food Insecurity:     Worried About Running Out of Food in the Last Year: Not on file    Jose Carlos of Food in the Last Year: Not on file   Transportation Needs:     Lack of Transportation (Medical): Not on file    Lack of Transportation (Non-Medical): Not on file   Physical Activity:     Days of Exercise per Week: Not on file    Minutes of Exercise per Session: Not on file   Stress:     Feeling of Stress : Not on file   Social Connections:     Frequency of Communication with Friends and Family: Not on file    Frequency of Social Gatherings with Friends and Family: Not on file    Attends [employer] Services: Not on file    Active Member of [address] [place] [address] or Organizations: Not on file    Attends Club or Organization Meetings: Not on file    Marital Status: Not on file   Intimate Partner Violence:     Fear of Current or Ex-Partner: Not on file    Emotionally Abused: Not on file    Physically Abused: Not on file    Sexually Abused: Not on file   Housing Stability:     Unable to Pay for Housing in the Last Year: Not on file    Number of [place] in the Last Year: Not on file    Unstable Housing in the Last Year: Not on file      Current Outpatient Medications   Medication Sig    hydrALAZINE (APRESOLINE) 50 mg tablet Take 1 Tablet by mouth three (3) times daily.  Bystolic 10 mg tablet Take 1 Tablet by mouth daily.  valsartan (DIOVAN) 320 mg tablet TAKE 1 TABLET BY MOUTH EVERY DAY INDICATIONS: HIGH BLOOD PRESSURE    Bifidobacterium Infantis (Align) 4 mg cap Take 1 Tablet by mouth daily.     azelastine (ASTELIN) 137 mcg (0.1 %) nasal spray 1 Spray by Both Nostrils route two (2) times a day.  amLODIPine (NORVASC) 10 mg tablet Take 1 Tab by mouth daily.  EPINEPHrine (EPIPEN) 0.3 mg/0.3 mL injection INJECT CONTENTS OF 1 PEN AS NEEDED FOR ALLERGIC REACTION AS DIRECTED    nystatin-triamcinolone (MYCOLOG II) topical cream as needed.  ALLERGIST TRAY 1CC 27GX1/2\" 1 mL 27 x 1/2\" syrg every seven (7) days.  Nebulizer Accessories kit Use as directed    cycloSPORINE (RESTASIS) 0.05 % ophthalmic emulsion Administer 1 Drop to both eyes two (2) times a day.  albuterol (PROAIR HFA) 90 mcg/actuation inhaler Take  by inhalation as needed for Wheezing.  valACYclovir (VALTREX) 1 gram tablet Take 1,000 mg by mouth daily.  mometasone-formoterol (DULERA) 100-5 mcg/actuation HFA inhaler Take 2 puffs by inhalation two (2) times a day.  mesalamine EC (ASACOL) 400 mg EC tablet Take 800 mg by mouth three (3) times daily.  hyoscyamine SL (LEVSIN/SL) 0.125 mg SL tablet 0.125 mg by SubLINGual route every four (4) hours.  CALCIUM CARBONATE (CALCIUM 300 PO) Take 600 mg by mouth daily.  pirbuterol (MAXAIR AUTOHALER) 200 mcg/Inhalation inhaler Take 2 Puffs by inhalation four (4) times daily as needed.  Omeprazole delayed release (PRILOSEC D/R) 20 mg tablet Take 40 mg by mouth daily.  estradiol (VAGIFEM) 25 mcg vaginal tablet Insert 25 mcg into vagina two (2) days a week.  biotin 2,500 mcg Tab Take 5,000 mcg by mouth daily. No current facility-administered medications for this visit. Review of Symptoms:  11 systems reviewed, negative other than as stated in the HPI    Physical ExamPhysical Exam:    There were no vitals filed for this visit. There is no height or weight on file to calculate BMI. General PE  Gen:  NAD  Mental Status - Alert. General Appearance - Not in acute distress. HEENT:  PERRL, no carotid bruits or JVD  Chest and Lung Exam   Inspection: Accessory muscles - No use of accessory muscles in breathing.    Auscultation: Breath sounds: - Normal.   Cardiovascular   Inspection: Jugular vein - Bilateral - Inspection Normal.   Palpation/Percussion:   Apical Impulse: - Normal.   Auscultation: Rhythm - Regular. Heart Sounds - S1 WNL and S2 WNL. No S3 or S4. Murmurs & Other Heart Sounds: Auscultation of the heart reveals - No Murmurs. Peripheral Vascular   Upper Extremity: Inspection - Bilateral - No Cyanotic nailbeds or Digital clubbing. Lower Extremity:   Palpation: Edema - Bilateral - No edema. Abdomen:   Soft, non-tender, bowel sounds are active.   Neuro: A&O times 3, CN and motor grossly WNL    Labs:   Lab Results   Component Value Date/Time    Cholesterol, total 277 (H) 11/17/2021 11:19 AM    Cholesterol, total 252 (H) 09/21/2020 09:28 AM    Cholesterol, total 269 (H) 03/20/2020 09:29 AM    Cholesterol, total 236 (H) 09/11/2019 10:58 AM    Cholesterol, total 245 (H) 02/22/2019 08:54 AM    HDL Cholesterol 96 11/17/2021 11:19 AM    HDL Cholesterol 102 09/21/2020 09:28 AM    HDL Cholesterol 112 03/20/2020 09:29 AM    HDL Cholesterol 98 09/11/2019 10:58 AM    HDL Cholesterol 100 02/22/2019 08:54 AM    LDL, calculated 161.4 (H) 11/17/2021 11:19 AM    LDL, calculated 128 09/21/2020 09:28 AM    LDL, calculated 138 (H) 03/20/2020 09:29 AM    LDL, calculated 119 09/11/2019 10:58 AM    LDL, calculated 129 02/22/2019 08:54 AM    Triglyceride 98 11/17/2021 11:19 AM    Triglyceride 110 09/21/2020 09:28 AM    Triglyceride 93 03/20/2020 09:29 AM    Triglyceride 96 09/11/2019 10:58 AM    Triglyceride 80 02/22/2019 08:54 AM    CHOL/HDL Ratio 2.9 11/17/2021 11:19 AM    CHOL/HDL Ratio 2 09/21/2020 09:28 AM    CHOL/HDL Ratio 2 03/20/2020 09:29 AM    CHOL/HDL Ratio 2 09/11/2019 10:58 AM    CHOL/HDL Ratio 2 02/22/2019 08:54 AM     Lab Results   Component Value Date/Time     11/28/2014 08:14 AM     Lab Results   Component Value Date/Time    Sodium 140 01/15/2022 02:52 PM    Potassium 3.8 01/15/2022 02:52 PM    Chloride 107 01/15/2022 02:52 PM CO2 29 01/15/2022 02:52 PM    Anion gap 4 (L) 01/15/2022 02:52 PM    Glucose 91 01/15/2022 02:52 PM    BUN 19 01/15/2022 02:52 PM    Creatinine 0.97 01/15/2022 02:52 PM    BUN/Creatinine ratio 20 01/15/2022 02:52 PM    GFR est AA >60 01/15/2022 02:52 PM    GFR est non-AA 56 (L) 01/15/2022 02:52 PM    Calcium 9.0 01/15/2022 02:52 PM    Bilirubin, total 0.2 01/15/2022 02:52 PM    Alk. phosphatase 80 01/15/2022 02:52 PM    Protein, total 7.9 01/15/2022 02:52 PM    Albumin 3.8 01/15/2022 02:52 PM    Globulin 4.1 (H) 01/15/2022 02:52 PM    A-G Ratio 0.9 (L) 01/15/2022 02:52 PM    ALT (SGPT) 21 01/15/2022 02:52 PM       EKG:       Assessment:     Assessment:        ICD-10-CM ICD-9-CM    1. Essential hypertension  I10 401.9    2. H/O cardiovascular stress test  Z92.89 V15.89    3. Mixed hyperlipidemia  E78.2 272.2    4. Chest pain, unspecified type  R07.9 786.50    5. Mitral valve insufficiency, unspecified etiology  I34.0 424.0        No orders of the defined types were placed in this encounter. Plan:     1. Dizziness, resolved  Possibly due to HTN  Echo 3/2022 normal EF, mild MR  No ischemia per NST 3/2022     2. Essential hypertension  BP at home 585R-510A systolic  Continue Bystolic, valsartan, and amlodipine at max doses  Continue hydralazine 50 mg TID  Consider renal artery duplex to r/o PRIMITIVO if BP is not improved     3. Chest pressure, resolved  Atypical symptoms, multiple risk factors  No ischemia per NST in 3/2022    4. HLD   11/2021  Labs and lipid per PCP    The 10-year ASCVD risk score (Brody Vasquez, et al., 2013) is: 30.2%    Values used to calculate the score:      Age: 77 years      Sex: Female      Is Non- : Yes      Diabetic: No      Tobacco smoker: No      Systolic Blood Pressure: 394 mmHg      Is BP treated: Yes      HDL Cholesterol: 96 MG/DL      Total Cholesterol: 277 MG/DL    Recommend starting statin. She would like to hold off for now.       Continue current care and f/u in 1 yr, sooner if needed    Rafael Caraballo, NP

## 2022-03-24 ENCOUNTER — OFFICE VISIT (OUTPATIENT)
Dept: CARDIOLOGY CLINIC | Age: 77
End: 2022-03-24
Payer: COMMERCIAL

## 2022-03-24 VITALS
HEART RATE: 50 BPM | DIASTOLIC BLOOD PRESSURE: 70 MMHG | BODY MASS INDEX: 25.88 KG/M2 | OXYGEN SATURATION: 100 % | SYSTOLIC BLOOD PRESSURE: 150 MMHG | WEIGHT: 140.6 LBS | RESPIRATION RATE: 16 BRPM | HEIGHT: 62 IN

## 2022-03-24 DIAGNOSIS — Z92.89 H/O CARDIOVASCULAR STRESS TEST: ICD-10-CM

## 2022-03-24 DIAGNOSIS — E78.2 MIXED HYPERLIPIDEMIA: ICD-10-CM

## 2022-03-24 DIAGNOSIS — I34.0 MITRAL VALVE INSUFFICIENCY, UNSPECIFIED ETIOLOGY: ICD-10-CM

## 2022-03-24 DIAGNOSIS — R07.9 CHEST PAIN, UNSPECIFIED TYPE: ICD-10-CM

## 2022-03-24 DIAGNOSIS — I10 ESSENTIAL HYPERTENSION: Primary | ICD-10-CM

## 2022-03-24 PROCEDURE — 1090F PRES/ABSN URINE INCON ASSESS: CPT | Performed by: NURSE PRACTITIONER

## 2022-03-24 PROCEDURE — G8427 DOCREV CUR MEDS BY ELIG CLIN: HCPCS | Performed by: NURSE PRACTITIONER

## 2022-03-24 PROCEDURE — G8536 NO DOC ELDER MAL SCRN: HCPCS | Performed by: NURSE PRACTITIONER

## 2022-03-24 PROCEDURE — 3017F COLORECTAL CA SCREEN DOC REV: CPT | Performed by: NURSE PRACTITIONER

## 2022-03-24 PROCEDURE — G8419 CALC BMI OUT NRM PARAM NOF/U: HCPCS | Performed by: NURSE PRACTITIONER

## 2022-03-24 PROCEDURE — G8754 DIAS BP LESS 90: HCPCS | Performed by: NURSE PRACTITIONER

## 2022-03-24 PROCEDURE — 1101F PT FALLS ASSESS-DOCD LE1/YR: CPT | Performed by: NURSE PRACTITIONER

## 2022-03-24 PROCEDURE — G8753 SYS BP > OR = 140: HCPCS | Performed by: NURSE PRACTITIONER

## 2022-03-24 PROCEDURE — G8432 DEP SCR NOT DOC, RNG: HCPCS | Performed by: NURSE PRACTITIONER

## 2022-03-24 PROCEDURE — 99214 OFFICE O/P EST MOD 30 MIN: CPT | Performed by: NURSE PRACTITIONER

## 2022-03-24 RX ORDER — ALBUTEROL SULFATE 0.83 MG/ML
SOLUTION RESPIRATORY (INHALATION)
COMMUNITY
Start: 2022-03-07

## 2022-03-24 NOTE — PROGRESS NOTES
Chief Complaint   Patient presents with    Results     Nuclear stress & Echocardiogram    Hypertension    Cholesterol Problem     1. Have you been to the ER, urgent care clinic since your last visit? No  Hospitalized since your last visit? No    2. Have you seen or consulted any other health care providers outside of the [address] [place] [address] since your last visit? Yes Gynecologist  Include any pap smears or colon screening.   No

## 2022-04-27 RX ORDER — NEBIVOLOL HYDROCHLORIDE 10 MG/1
10 TABLET ORAL DAILY
Qty: 90 TABLET | Refills: 3 | Status: SHIPPED | OUTPATIENT
Start: 2022-04-27 | End: 2022-06-23 | Stop reason: ALTCHOICE

## 2022-04-27 NOTE — TELEPHONE ENCOUNTER
Last Refill: 2-1-22  Last Visit: 1/13/2022   Next Visit: 5/18/2022     Requested Prescriptions     Pending Prescriptions Disp Refills    Bystolic 10 mg tablet 90 Tablet 3     Sig: Take 1 Tablet by mouth daily.

## 2022-05-18 ENCOUNTER — OFFICE VISIT (OUTPATIENT)
Dept: FAMILY MEDICINE CLINIC | Age: 77
End: 2022-05-18
Payer: COMMERCIAL

## 2022-05-18 VITALS
TEMPERATURE: 97.3 F | SYSTOLIC BLOOD PRESSURE: 139 MMHG | RESPIRATION RATE: 16 BRPM | BODY MASS INDEX: 26.02 KG/M2 | OXYGEN SATURATION: 100 % | HEIGHT: 62 IN | HEART RATE: 57 BPM | WEIGHT: 141.4 LBS | DIASTOLIC BLOOD PRESSURE: 70 MMHG

## 2022-05-18 DIAGNOSIS — I83.92 ASYMPTOMATIC VARICOSE VEINS OF LEFT LOWER EXTREMITY: ICD-10-CM

## 2022-05-18 DIAGNOSIS — I10 ESSENTIAL HYPERTENSION: Primary | ICD-10-CM

## 2022-05-18 DIAGNOSIS — E78.2 MIXED HYPERLIPIDEMIA: ICD-10-CM

## 2022-05-18 DIAGNOSIS — K21.9 HIATAL HERNIA WITH GERD: ICD-10-CM

## 2022-05-18 DIAGNOSIS — M19.049 HAND ARTHRITIS: ICD-10-CM

## 2022-05-18 DIAGNOSIS — Z12.31 ENCOUNTER FOR SCREENING MAMMOGRAM FOR BREAST CANCER: ICD-10-CM

## 2022-05-18 DIAGNOSIS — K50.80 CROHN'S DISEASE OF BOTH SMALL AND LARGE INTESTINE WITHOUT COMPLICATION (HCC): ICD-10-CM

## 2022-05-18 DIAGNOSIS — M17.0 PRIMARY OSTEOARTHRITIS OF BOTH KNEES: ICD-10-CM

## 2022-05-18 DIAGNOSIS — Z79.899 ENCOUNTER FOR LONG-TERM (CURRENT) USE OF MEDICATIONS: ICD-10-CM

## 2022-05-18 DIAGNOSIS — R92.2 DENSE BREAST TISSUE ON MAMMOGRAM: ICD-10-CM

## 2022-05-18 DIAGNOSIS — J45.20 MILD INTERMITTENT ASTHMA WITHOUT COMPLICATION: ICD-10-CM

## 2022-05-18 DIAGNOSIS — K44.9 HIATAL HERNIA WITH GERD: ICD-10-CM

## 2022-05-18 PROCEDURE — 3017F COLORECTAL CA SCREEN DOC REV: CPT | Performed by: FAMILY MEDICINE

## 2022-05-18 PROCEDURE — G8427 DOCREV CUR MEDS BY ELIG CLIN: HCPCS | Performed by: FAMILY MEDICINE

## 2022-05-18 PROCEDURE — 99203 OFFICE O/P NEW LOW 30 MIN: CPT | Performed by: FAMILY MEDICINE

## 2022-05-18 PROCEDURE — G8419 CALC BMI OUT NRM PARAM NOF/U: HCPCS | Performed by: FAMILY MEDICINE

## 2022-05-18 PROCEDURE — 1101F PT FALLS ASSESS-DOCD LE1/YR: CPT | Performed by: FAMILY MEDICINE

## 2022-05-18 PROCEDURE — G8752 SYS BP LESS 140: HCPCS | Performed by: FAMILY MEDICINE

## 2022-05-18 PROCEDURE — G8510 SCR DEP NEG, NO PLAN REQD: HCPCS | Performed by: FAMILY MEDICINE

## 2022-05-18 PROCEDURE — G8754 DIAS BP LESS 90: HCPCS | Performed by: FAMILY MEDICINE

## 2022-05-18 PROCEDURE — G8536 NO DOC ELDER MAL SCRN: HCPCS | Performed by: FAMILY MEDICINE

## 2022-05-18 PROCEDURE — 1090F PRES/ABSN URINE INCON ASSESS: CPT | Performed by: FAMILY MEDICINE

## 2022-05-18 NOTE — PROGRESS NOTES
Aisha Cueva (: 1945) is a 77 y.o. female, established patient, here for evaluation of the following chief complaint(s):  Establish Care (New patient)       ASSESSMENT/PLAN:  Diagnoses and all orders for this visit:    1. Essential hypertension - BP much improved on repeat and near goal on home readings. Discussed ct meds. Interested in switching off Bystolic for Metoprolol in future. Discussed may consider at next appt  -     METABOLIC PANEL, COMPREHENSIVE; Future    2. Crohn's disease of both small and large intestine without complication ([facility] [address] - doing well and following with Dr. Martin Nichole, ct on mesalamine  -     CBC W/O DIFF; Future  -     METABOLIC PANEL, COMPREHENSIVE; Future    3. Mixed hyperlipidemia - reviewed 10 yr calculated CVD risk is:28% and may benefit from statin. To recheck labs and may consider at next appt. Reviewed stress test and echo with pt.  -     METABOLIC PANEL, COMPREHENSIVE; Future  -     LIPID PANEL; Future    4. Mild intermittent asthma without complication - controlled    5. Hiatal hernia with GERD - mild sx    6. Hand arthritis - seeing ortho    7. Primary osteoarthritis of both knees - seeing ortho, encouraged low weight bearing exercise    8. Dense breast tissue on mammogram  9. Encounter for screening mammogram for breast cancer  -     [facility] 3D HILARY W MAMMO BI SCREENING INCL CAD; Future    10. Asymptomatic varicose veins of left lower extremity - reassured    11. Encounter for long-term (current) use of medications  -     CBC W/O DIFF; Future  -     METABOLIC PANEL, COMPREHENSIVE; Future  -     LIPID PANEL; Future    Also reviewed labs and discussed with pt importance of pneumonia vaccine (Prevnar 20 or Pneumovax 23) in setting of age and hx of asthma. She declines but will consider at next appt. Return in about 6 weeks (around 2022), or if symptoms worsen or fail to improve.       SUBJECTIVE/OBJECTIVE:  New pt to est care - 76 yo AAF with PMH sig for Crohns Dz, HTN, HLD, asymptomatic bradycardia and asthma. Had recent stress test and echo with cardio 2 months ago with no sig concerns. Echo with nml EF and mild MVR. Had syncope from [place] vaccine. She is fully vaccinated and boosted x 2 with Moderna.  had knee surgery 2 weeks ago and she feels overwhelmed. Last labs in 1/2022. Mild anemia. Decr GFR. Dx with Crohns in 63s. Found after Dr. Brent Perez noted similar sx in her eyes to Crohns patients. She reported some GI sx but pushed through them for years. Hyperlipidemia & HTN - Currently on Hydralazine 50 mg BID, Valsartan 320 mg, amlodipine 10 mg, bystolic 10 mg. Unable to melida spironolactone d/t hives years ago. Pt is doing well on current meds with no medication side effects noted  No new myalgias, no joint pains, no weakness  No TIA's, no chest pain on exertion, no swelling of ankles. Exercising - Minimal, prev was more active  Dieting - Yes, arminda with Crohns  Smoking - No     Lab Results   Component Value Date/Time    Cholesterol, total 277 (H) 11/17/2021 11:19 AM    HDL Cholesterol 96 11/17/2021 11:19 AM    LDL, calculated 161.4 (H) 11/17/2021 11:19 AM    Triglyceride 98 11/17/2021 11:19 AM     Had meniscal surgery on left knee many years ago. Following with Dr. Genia Alvarado now. ROS  Gen - no fever/chills  Resp - no dyspnea or cough. Hx of asthma and allergies. Followed by Dr. Izabela Francisco. Getting allergy shots.   On Dulera, astelin, albuterol prn.  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, 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    Hair loss 6/28/2017    Impression: refer to derm    Herpes Dx 2015    no outbreak as of 5/2/16    Hyperlipidemia 6/28/2017    Hypertension     Hypokalemia 6/28/2017    MRSA (methicillin resistant staph aureus) culture positive \"many years ago\" as of 5/2/16    pt states + nasal swab \"many years ago\", Tx and no + since; UNCONFIRMED    Osteoporosis 6/28/2017    Pneumonia 6/28/2017    Impression:  Completed course of antibiotics, clinically resolved Status is Inactive    Syncope     Moderna vaccine    Traumatic hematoma of scalp 6/28/2017    Status is Inactive    Ulcerative colitis ([facility] [address] 6/28/2017     Past Surgical History:   Procedure Laterality Date    HX CATARACT REMOVAL Right 3/28/16    HX MOHS PROCEDURES Right     HX ORTHOPAEDIC Left     left knee sx    HX ORTHOPAEDIC Right     trigger release      HX TUBAL LIGATION       Current Outpatient Medications on File Prior to Visit   Medication Sig Dispense Refill    Bystolic 10 mg tablet Take 1 Tablet by mouth daily. 90 Tablet 3    albuterol (PROVENTIL VENTOLIN) 2.5 mg /3 mL (0.083 %) nebu USE 1 VIAL VIA NEBULIZER EVERY 4 HOURS AS NEEDED      hydrALAZINE (APRESOLINE) 50 mg tablet Take 1 Tablet by mouth three (3) times daily. 270 Tablet 3    valsartan (DIOVAN) 320 mg tablet TAKE 1 TABLET BY MOUTH EVERY DAY INDICATIONS: HIGH BLOOD PRESSURE 90 Tablet 1    Bifidobacterium Infantis (Align) 4 mg cap Take 1 Tablet by mouth daily.  azelastine (ASTELIN) 137 mcg (0.1 %) nasal spray 1 [place] by Both Nostrils route two (2) times a day.  amLODIPine (NORVASC) 10 mg tablet Take 1 Tab by mouth daily. 90 Tab 3    nystatin-triamcinolone (MYCOLOG II) topical cream as needed.  ALLERGIST TRAY 1CC 27GX1/2\" 1 mL 27 x 1/2\" syrg every seven (7) days.   [address] Use as directed 1 Kit 3    cycloSPORINE (RESTASIS) 0.05 % ophthalmic emulsion Administer 1 Drop to both eyes two (2) times a day.  albuterol (PROAIR HFA) 90 mcg/actuation inhaler Take  by inhalation as needed for Wheezing.  valACYclovir (VALTREX) 1 gram tablet Take 1,000 mg by mouth daily.  mometasone-formoterol (DULERA) 100-5 mcg/actuation HFA inhaler Take 2 puffs by inhalation two (2) times a day.  mesalamine EC (ASACOL) 400 mg EC tablet Take 800 mg by mouth three (3) times daily.  hyoscyamine SL (LEVSIN/SL) 0.125 mg SL tablet 0.125 mg by SubLINGual route every four (4) hours.  CALCIUM CARBONATE (CALCIUM 300 PO) Take 600 mg by mouth daily.  pirbuterol (MAXAIR AUTOHALER) 200 mcg/Inhalation inhaler Take 2 Puffs by inhalation four (4) times daily as needed.  Omeprazole delayed release (PRILOSEC D/R) 20 mg tablet Take 40 mg by mouth daily.  estradiol (VAGIFEM) 25 mcg vaginal tablet Insert 25 mcg into vagina two (2) days a week.  biotin 2,500 mcg Tab Take 5,000 mcg by mouth daily.  EPINEPHrine (EPIPEN) 0.3 mg/0.3 mL injection INJECT CONTENTS OF 1 PEN AS NEEDED FOR ALLERGIC REACTION AS DIRECTED       No current facility-administered medications on file prior to visit.         Objective:     Vitals:    05/18/22 0918   BP: (!) 178/75   Pulse: (!) 57   Resp: 16   Temp: 97.3 °F (36.3 °C)   TempSrc: Temporal   SpO2: 100%   Weight: 141 lb 6.4 oz (64.1 kg)   Height: 5' 1.5\" (1.562 m)     Physical Examination:  General appearance - alert, well appearing, and in no distress  Eyes -sclera anicteric  Neck - supple, no significant adenopathy, no thyromegaly, no bruits  Chest - clear to auscultation, no wheezes, rales or rhonchi, symmetric air entry  Heart - normal rate, regular rhythm, normal S1, S2, no murmurs, rubs, clicks or gallops  Neurological - alert, oriented, no focal findings or movement disorder noted  Extremitiesno edema  Psychnormal mood and affect  Feet:            On this date 05/18/2022 I have spent 30 minutes reviewing previous notes, test results and face to face with the patient discussing the diagnosis and importance of compliance with the treatment plan as well as documenting on the day of the visit. An electronic signature was used to authenticate this note.   -- Wing Valladares MD

## 2022-05-18 NOTE — PROGRESS NOTES
Chief Complaint   Patient presents with   [facility] Establish Care     New patient

## 2022-05-24 LAB
ALBUMIN SERPL-MCNC: 4.5 G/DL (ref 3.7–4.7)
ALBUMIN/GLOB SERPL: 1.6 {RATIO} (ref 1.2–2.2)
ALP SERPL-CCNC: 75 IU/L (ref 44–121)
ALT SERPL-CCNC: 14 IU/L (ref 0–32)
AST SERPL-CCNC: 13 IU/L (ref 0–40)
BILIRUB SERPL-MCNC: 0.3 MG/DL (ref 0–1.2)
BUN SERPL-MCNC: 15 MG/DL (ref 8–27)
BUN/CREAT SERPL: 15 (ref 12–28)
CALCIUM SERPL-MCNC: 9.5 MG/DL (ref 8.7–10.3)
CHLORIDE SERPL-SCNC: 106 MMOL/L (ref 96–106)
CHOLEST SERPL-MCNC: 248 MG/DL (ref 100–199)
CO2 SERPL-SCNC: 24 MMOL/L (ref 20–29)
CREAT SERPL-MCNC: 1.03 MG/DL (ref 0.57–1)
EGFR: 57 ML/MIN/1.73
ERYTHROCYTE [DISTWIDTH] IN BLOOD BY AUTOMATED COUNT: 15.9 % (ref 11.7–15.4)
GLOBULIN SER CALC-MCNC: 2.9 G/DL (ref 1.5–4.5)
GLUCOSE SERPL-MCNC: 89 MG/DL (ref 65–99)
HCT VFR BLD AUTO: 37.1 % (ref 34–46.6)
HDLC SERPL-MCNC: 91 MG/DL
HGB BLD-MCNC: 11.4 G/DL (ref 11.1–15.9)
INTERPRETATION: NORMAL
LDLC SERPL CALC-MCNC: 147 MG/DL (ref 0–99)
MCH RBC QN AUTO: 22.8 PG (ref 26.6–33)
MCHC RBC AUTO-ENTMCNC: 30.7 G/DL (ref 31.5–35.7)
MCV RBC AUTO: 74 FL (ref 79–97)
PLATELET # BLD AUTO: 263 X10E3/UL (ref 150–450)
POTASSIUM SERPL-SCNC: 4.1 MMOL/L (ref 3.5–5.2)
PROT SERPL-MCNC: 7.4 G/DL (ref 6–8.5)
RBC # BLD AUTO: 4.99 X10E6/UL (ref 3.77–5.28)
SODIUM SERPL-SCNC: 146 MMOL/L (ref 134–144)
TRIGL SERPL-MCNC: 64 MG/DL (ref 0–149)
VLDLC SERPL CALC-MCNC: 10 MG/DL (ref 5–40)
WBC # BLD AUTO: 5.5 X10E3/UL (ref 3.4–10.8)

## 2022-06-02 RX ORDER — AMLODIPINE BESYLATE 10 MG/1
TABLET ORAL
Qty: 90 TABLET | Refills: 3 | Status: SHIPPED | OUTPATIENT
Start: 2022-06-02

## 2022-06-20 ENCOUNTER — HOSPITAL ENCOUNTER (OUTPATIENT)
Dept: MAMMOGRAPHY | Age: 77
Discharge: HOME | End: 2022-06-20
Attending: FAMILY MEDICINE
Payer: COMMERCIAL

## 2022-06-20 DIAGNOSIS — Z12.31 ENCOUNTER FOR SCREENING MAMMOGRAM FOR BREAST CANCER: ICD-10-CM

## 2022-06-20 DIAGNOSIS — R92.2 DENSE BREAST TISSUE ON MAMMOGRAM: ICD-10-CM

## 2022-06-20 PROCEDURE — 77063 BREAST TOMOSYNTHESIS BI: CPT

## 2022-06-23 ENCOUNTER — OFFICE VISIT (OUTPATIENT)
Dept: FAMILY MEDICINE CLINIC | Age: 77
End: 2022-06-23
Payer: COMMERCIAL

## 2022-06-23 VITALS
HEIGHT: 62 IN | WEIGHT: 141.6 LBS | BODY MASS INDEX: 26.06 KG/M2 | TEMPERATURE: 97.5 F | SYSTOLIC BLOOD PRESSURE: 167 MMHG | DIASTOLIC BLOOD PRESSURE: 72 MMHG | RESPIRATION RATE: 16 BRPM | HEART RATE: 53 BPM | OXYGEN SATURATION: 99 %

## 2022-06-23 DIAGNOSIS — M81.0 AGE-RELATED OSTEOPOROSIS WITHOUT CURRENT PATHOLOGICAL FRACTURE: ICD-10-CM

## 2022-06-23 DIAGNOSIS — E78.2 MIXED HYPERLIPIDEMIA: ICD-10-CM

## 2022-06-23 DIAGNOSIS — I10 ESSENTIAL HYPERTENSION: Primary | ICD-10-CM

## 2022-06-23 DIAGNOSIS — R94.4 DECREASED GFR: ICD-10-CM

## 2022-06-23 PROCEDURE — G8754 DIAS BP LESS 90: HCPCS | Performed by: FAMILY MEDICINE

## 2022-06-23 PROCEDURE — 99214 OFFICE O/P EST MOD 30 MIN: CPT | Performed by: FAMILY MEDICINE

## 2022-06-23 PROCEDURE — G8432 DEP SCR NOT DOC, RNG: HCPCS | Performed by: FAMILY MEDICINE

## 2022-06-23 PROCEDURE — 1101F PT FALLS ASSESS-DOCD LE1/YR: CPT | Performed by: FAMILY MEDICINE

## 2022-06-23 PROCEDURE — G8417 CALC BMI ABV UP PARAM F/U: HCPCS | Performed by: FAMILY MEDICINE

## 2022-06-23 PROCEDURE — 1123F ACP DISCUSS/DSCN MKR DOCD: CPT | Performed by: FAMILY MEDICINE

## 2022-06-23 PROCEDURE — G8427 DOCREV CUR MEDS BY ELIG CLIN: HCPCS | Performed by: FAMILY MEDICINE

## 2022-06-23 PROCEDURE — G8753 SYS BP > OR = 140: HCPCS | Performed by: FAMILY MEDICINE

## 2022-06-23 PROCEDURE — 1090F PRES/ABSN URINE INCON ASSESS: CPT | Performed by: FAMILY MEDICINE

## 2022-06-23 PROCEDURE — G8536 NO DOC ELDER MAL SCRN: HCPCS | Performed by: FAMILY MEDICINE

## 2022-06-23 RX ORDER — VALSARTAN AND HYDROCHLOROTHIAZIDE 320; 25 MG/1; MG/1
1 TABLET, FILM COATED ORAL DAILY
Qty: 90 TABLET | Refills: 0 | Status: SHIPPED | OUTPATIENT
Start: 2022-06-23 | End: 2022-08-29 | Stop reason: DRUGHIGH

## 2022-06-23 RX ORDER — METOPROLOL SUCCINATE 50 MG/1
50 TABLET, EXTENDED RELEASE ORAL DAILY
Qty: 90 TABLET | Refills: 0 | Status: SHIPPED | OUTPATIENT
Start: 2022-06-23

## 2022-06-23 NOTE — PATIENT INSTRUCTIONS
Your blood pressure continues to run too high. We are making a few adjustments today:    1. We are adding on HCTZ which you have taken in the past.  I am ordering this as a combo medication in the form of valsartan/HCTZ 320/25 mg which she will take daily in place of the valsartan. 2.  You reported having low blood pressure symptoms with the hydralazine so until you  the valsartan/HCTZ medication, please switch your hydralazine to 25 mg twice daily and take your first dose in the afternoon instead of with Bystolic and amlodipine in the morning. 3.  We also discussed the cost of Bystolic running high previously so we are switching you to metoprolol 50 mg daily. You can make this change after you run out of Bystolic 10 mg.    4.  We will recheck your blood work in 6 weeks. 5.  We also discussed that you would benefit from a statin medication like Crestor. You declined this today but are open to trying this in the future which would help decrease your risk of heart attacks and strokes.

## 2022-06-23 NOTE — PROGRESS NOTES
Negro Ivey (: 1945) is a 69 y.o. female, established patient, here for evaluation of the following chief complaint(s):  No chief complaint on file. ASSESSMENT/PLAN:  Diagnoses and all orders for this visit:    1. Essential hypertension- BP too high today, near goal on home readings at times. Discussed med adjustment. Adding HCTZ. Switching off Bystolic for Metoprolol due to cost.  Hypotensive symptoms with additional doses of hydralazine so asked patient to decrease to 25 mg twice daily since she is only taking 50 mg once daily  -     valsartan-hydroCHLOROthiazide (DIOVAN-HCT) 320-25 mg per tablet; Take 1 Tablet by mouth daily. -     metoprolol succinate (TOPROL-XL) 50 mg XL tablet; Take 1 Tablet by mouth daily. 2. Mixed hyperlipidemia - reviewed 10 yr calculated CVD risk is: ~28% and may benefit from statin but she is not ready for this today. Will address again at next appt. Reviewed stress test and echo with pt.    3. Age-related osteoporosis without current pathological fracture-discussed potentially getting a DEXA scan and starting bisphosphonates    4. Decreased GFR-rechecking labs in 6 weeks    Also reviewed labs and discussed with pt importance of pneumonia vaccine (Prevnar 20 or Pneumovax 23) in setting of age and hx of asthma. She declines but will consider at next appt. Return in about 6 weeks (around 2022), or if symptoms worsen or fail to improve. SUBJECTIVE/OBJECTIVE:  76 yo AAF with PMH sig for Crohns Dz, HTN, HLD, asymptomatic bradycardia and asthma here to f/u on HTN. Had recent stress test and echo with cardio 2 months ago with no sig concerns. Echo with nml EF and mild MVR. Had syncope from [employer] [employer] vaccine. She is fully vaccinated and boosted x 2 with Moderna.     Hyperlipidemia & HTN -  For HTN, Currently on Hydralazine 50 mg daily (has been ordered as TID but gets hypotensive when taking this way), Valsartan 320 mg, amlodipine 10 mg, bystolic 10 mg.  Unable to melida spironolactone d/t hives years ago. Has been seeing Cardiology for this. Pt is doing well on current meds with no medication side effects noted  No new myalgias, no joint pains, no weakness  No TIA's, no chest pain on exertion, no swelling of ankles. Exercising - Minimal, prev was more active  Dieting - Yes, arminda with Crohns  Smoking - No     Lab Results   Component Value Date/Time    Cholesterol, total 248 (H) 05/23/2022 08:22 AM    HDL Cholesterol 91 05/23/2022 08:22 AM    LDL, calculated 147 (H) 05/23/2022 08:22 AM    LDL, calculated 161.4 (H) 11/17/2021 11:19 AM    Triglyceride 64 05/23/2022 08:22 AM     ROS  Gen - no fever/chills  Resp - no dyspnea or cough. Hx of asthma and allergies. Followed by Dr. Sugar Yang. Getting allergy shots. On Dulera, astelin, albuterol prn.  CV - no chest pain or ZACARIAS  GI - Dx with Crohns in 63s - following with Dr. Che Krishnamurthy. Found after Dr. Vicki Casillas noted similar sx in her eyes to Crohns patients. She reported some GI sx but pushed through them for years. Msk - Had meniscal surgery on left knee many years ago. Following with Dr. Shara Lakhani now. Rest per HPI    Past Surgical History:   Procedure Laterality Date    HX CATARACT REMOVAL Right 3/28/16    HX MOHS PROCEDURES Right     HX ORTHOPAEDIC Left     left knee sx    HX ORTHOPAEDIC Right     trigger release      HX TUBAL LIGATION       Current Outpatient Medications on File Prior to Visit   Medication Sig Dispense Refill    amLODIPine (NORVASC) 10 mg tablet TAKE 1 TABLET BY MOUTH EVERY DAY 90 Tablet 3    Bystolic 10 mg tablet Take 1 Tablet by mouth daily. 90 Tablet 3    albuterol (PROVENTIL VENTOLIN) 2.5 mg /3 mL (0.083 %) nebu USE 1 VIAL VIA NEBULIZER EVERY 4 HOURS AS NEEDED      valsartan (DIOVAN) 320 mg tablet TAKE 1 TABLET BY MOUTH EVERY DAY INDICATIONS: HIGH BLOOD PRESSURE 90 Tablet 1    Bifidobacterium Infantis (Align) 4 mg cap Take 1 Tablet by mouth daily.       azelastine (ASTELIN) 137 mcg (0.1 %) nasal spray 1 [place] by Both Nostrils route two (2) times a day.  EPINEPHrine (EPIPEN) 0.3 mg/0.3 mL injection INJECT CONTENTS OF 1 PEN AS NEEDED FOR ALLERGIC REACTION AS DIRECTED      nystatin-triamcinolone (MYCOLOG II) topical cream as needed.  ALLERGIST TRAY 1CC 27GX1/2\" 1 mL 27 x 1/2\" syrg every seven (7) days. 99    cycloSPORINE (RESTASIS) 0.05 % ophthalmic emulsion Administer 1 Drop to both eyes two (2) times a day.  albuterol (PROAIR HFA) 90 mcg/actuation inhaler Take  by inhalation as needed for Wheezing.  valACYclovir (VALTREX) 1 gram tablet Take 1,000 mg by mouth daily.  mometasone-formoterol (DULERA) 100-5 mcg/actuation HFA inhaler Take 2 puffs by inhalation two (2) times a day.  mesalamine EC (ASACOL) 400 mg EC tablet Take 800 mg by mouth three (3) times daily.  hyoscyamine SL (LEVSIN/SL) 0.125 mg SL tablet 0.125 mg by SubLINGual route every four (4) hours.  CALCIUM CARBONATE (CALCIUM 300 PO) Take 600 mg by mouth daily.  pirbuterol (MAXAIR AUTOHALER) 200 mcg/Inhalation inhaler Take 2 Puffs by inhalation four (4) times daily as needed.  Omeprazole delayed release (PRILOSEC D/R) 20 mg tablet Take 40 mg by mouth daily.  estradiol (VAGIFEM) 25 mcg vaginal tablet Insert 25 mcg into vagina two (2) days a week.  biotin 2,500 mcg Tab Take 5,000 mcg by mouth daily.  hydrALAZINE (APRESOLINE) 50 mg tablet Take 1 Tablet by mouth three (3) times daily. (Patient not taking: Reported on 6/23/2022) 270 Tablet 3    Nebulizer Accessories kit Use as directed 1 Kit 3     No current facility-administered medications on file prior to visit.         Objective:     Vitals:    06/23/22 0841   BP: (!) 167/72   Pulse: (!) 53   Resp: 16   Temp: 97.5 °F (36.4 °C)   TempSrc: Temporal   SpO2: 99%   Weight: 141 lb 9.6 oz (64.2 kg)   Height: 5' 1.5\" (1.562 m)     Physical Examination:  General appearance - alert, well appearing, and in no distress  Eyes -sclera anicteric  Neck - supple, no significant adenopathy, no thyromegaly, no bruits  Chest - clear to auscultation, no wheezes, rales or rhonchi, symmetric air entry  Heart - normal rate, regular rhythm, normal S1, S2, no murmurs, rubs, clicks or gallops  Neurological - alert, oriented, no focal findings or movement disorder noted  Extremities-no edema  Psych-normal mood and affect    On this date 06/23/2022 I have spent 30 minutes reviewing previous notes, test results and face to face with the patient discussing the diagnosis and importance of compliance with the treatment plan as well as documenting on the day of the visit. An electronic signature was used to authenticate this note.   -- Maria Luisa Marrero MD

## 2022-07-18 ENCOUNTER — TELEPHONE (OUTPATIENT)
Dept: FAMILY MEDICINE CLINIC | Age: 77
End: 2022-07-18

## 2022-07-18 NOTE — TELEPHONE ENCOUNTER
Pt calling back - thinks her BP is bottoming out - it is 123/101    Pls call patient about BP problems     States 7/16/22 /60  7/17/22 up & down all day   7/18/22 122/72    Pls advise at [phone] or [phone]
Spoke with patient and scheduled appointment for patient to see Dr Sendy Bro in am.
82

## 2022-07-19 ENCOUNTER — OFFICE VISIT (OUTPATIENT)
Dept: FAMILY MEDICINE CLINIC | Age: 77
End: 2022-07-19
Payer: COMMERCIAL

## 2022-07-19 VITALS
HEIGHT: 62 IN | DIASTOLIC BLOOD PRESSURE: 71 MMHG | OXYGEN SATURATION: 97 % | BODY MASS INDEX: 26.2 KG/M2 | TEMPERATURE: 97.1 F | SYSTOLIC BLOOD PRESSURE: 139 MMHG | WEIGHT: 142.4 LBS | HEART RATE: 50 BPM | RESPIRATION RATE: 16 BRPM

## 2022-07-19 DIAGNOSIS — I10 ESSENTIAL HYPERTENSION: Primary | ICD-10-CM

## 2022-07-19 DIAGNOSIS — E78.2 MIXED HYPERLIPIDEMIA: ICD-10-CM

## 2022-07-19 PROCEDURE — 1101F PT FALLS ASSESS-DOCD LE1/YR: CPT | Performed by: FAMILY MEDICINE

## 2022-07-19 PROCEDURE — G8754 DIAS BP LESS 90: HCPCS | Performed by: FAMILY MEDICINE

## 2022-07-19 PROCEDURE — G8427 DOCREV CUR MEDS BY ELIG CLIN: HCPCS | Performed by: FAMILY MEDICINE

## 2022-07-19 PROCEDURE — 1090F PRES/ABSN URINE INCON ASSESS: CPT | Performed by: FAMILY MEDICINE

## 2022-07-19 PROCEDURE — 1123F ACP DISCUSS/DSCN MKR DOCD: CPT | Performed by: FAMILY MEDICINE

## 2022-07-19 PROCEDURE — 99213 OFFICE O/P EST LOW 20 MIN: CPT | Performed by: FAMILY MEDICINE

## 2022-07-19 PROCEDURE — G8432 DEP SCR NOT DOC, RNG: HCPCS | Performed by: FAMILY MEDICINE

## 2022-07-19 PROCEDURE — G8752 SYS BP LESS 140: HCPCS | Performed by: FAMILY MEDICINE

## 2022-07-19 PROCEDURE — G8417 CALC BMI ABV UP PARAM F/U: HCPCS | Performed by: FAMILY MEDICINE

## 2022-07-19 PROCEDURE — G8536 NO DOC ELDER MAL SCRN: HCPCS | Performed by: FAMILY MEDICINE

## 2022-07-19 RX ORDER — SOD SULF/POT CHLORIDE/MAG SULF 1.479 G
TABLET ORAL
COMMUNITY
Start: 2022-06-23 | End: 2022-07-19 | Stop reason: ALTCHOICE

## 2022-07-19 NOTE — PROGRESS NOTES
Chief Complaint   Patient presents with    Hypertension     Readings ranging from high to low   1. Have you been to the ER, urgent care clinic since your last visit? Hospitalized since your last visit? No    2. Have you seen or consulted any other health care providers outside of the [address] [place] [address] since your last visit? Include any pap smears or colon screening.  No     BP fluctuating up and down,dizziness and feeling fatigue

## 2022-07-19 NOTE — PROGRESS NOTES
Rubin Munroe (: 1945) is a 69 y.o. female, established patient, here for evaluation of the following chief complaint(s):  Hypertension (Readings ranging from high to low)       ASSESSMENT/PLAN:  Diagnoses and all orders for this visit:    1. Essential hypertension-BP initially very high but dramatically improved on repeat in office today. Home readings improved but having hypotension symptoms. Stopping hydralazine (patient already stopped taking this). Still using Bystolic 10 mg daily (instead of metoprolol-planned to switch due to cost) so we will continue this. Continue amlodipine 10 mg daily and valsartan/HCTZ 320/25 mg daily. 2. Mixed hyperlipidemia - reviewed 10 yr calculated CVD risk is: ~28% and may benefit from statin but she is not ready for this today. We will review in future. Reviewed stress test and echo with pt. Return in about 6 weeks (around 2022), or if symptoms worsen or fail to improve. SUBJECTIVE/OBJECTIVE:  76 yo AAF with PMH sig for Crohns Dz, HTN, HLD, asymptomatic bradycardia and asthma here to f/u on HTN. Had syncope from [employer] vaccine. She is fully vaccinated and boosted x 2 with Moderna. Hyperlipidemia & HTN -  Patient is currently taking Bystolic 10 mg daily, amlodipine 10 mg daily, Valsartan/HCTZ 320/25 mg   She reports home readings of 100-120s/60-70s but has felt hypotensive symptoms over the past few days. She stopped taking hydralazine (was on 50 mg 3 times daily but had hypotensive symptoms so decreased to 25 mg twice daily at last appointment)  With plans to switch her to metoprolol from nebivolol due to cost but she has continued as she still has nebivolol pills and was waiting until she ran out to make the switch. Unable to melida spironolactone d/t hives years ago. Has been seeing Cardiology for this. Had recent stress test and echo with cardio 2 months ago with no sig concerns. Echo with nml EF and mild MVR.     No TIA's, no chest pain on exertion, no swelling of ankles. Exercising - Minimal, prev was more active  Dieting - Yes, arminda with Crohns  Smoking - No     Lab Results   Component Value Date/Time    Cholesterol, total 248 (H) 05/23/2022 08:22 AM    HDL Cholesterol 91 05/23/2022 08:22 AM    LDL, calculated 147 (H) 05/23/2022 08:22 AM    LDL, calculated 161.4 (H) 11/17/2021 11:19 AM    Triglyceride 64 05/23/2022 08:22 AM     ROS  Gen - no fever/chills  Resp - no dyspnea or cough. Hx of asthma and allergies. Followed by Dr. Elizabeth Cazares. Getting allergy shots. On Dulera, astelin, albuterol prn.  CV - no chest pain or ZACARIAS  GI - Dx with Crohns in 63s - following with Dr. Jazlyn Steinberg. Found after Dr. John Mancilla noted similar sx in her eyes to Crohns patients. She reported some GI sx but pushed through them for years. Msk - Had meniscal surgery on left knee many years ago. Following with Dr. Deng Bernard now. Rest per HPI    Past Surgical History:   Procedure Laterality Date    HX CATARACT REMOVAL Right 3/28/16    HX MOHS PROCEDURES Right     HX ORTHOPAEDIC Left     left knee sx    HX ORTHOPAEDIC Right     trigger release      HX TUBAL LIGATION       Current Outpatient Medications on File Prior to Visit   Medication Sig Dispense Refill    valsartan-hydroCHLOROthiazide (DIOVAN-HCT) 320-25 mg per tablet Take 1 Tablet by mouth daily. 90 Tablet 0    amLODIPine (NORVASC) 10 mg tablet TAKE 1 TABLET BY MOUTH EVERY DAY 90 Tablet 3    albuterol (PROVENTIL VENTOLIN) 2.5 mg /3 mL (0.083 %) nebu USE 1 VIAL VIA NEBULIZER EVERY 4 HOURS AS NEEDED      azelastine (ASTELIN) 137 mcg (0.1 %) nasal spray 1 [place] by Both Nostrils route two (2) times a day.  ALLERGIST TRAY 1CC 27GX1/2\" 1 mL 27 x 1/2\" syrg every seven (7) days. 99    Nebulizer Accessories kit Use as directed 1 Kit 3    cycloSPORINE (RESTASIS) 0.05 % ophthalmic emulsion Administer 1 Drop to both eyes two (2) times a day.       albuterol (PROAIR HFA) 90 mcg/actuation inhaler Take  by inhalation as needed for Wheezing.  valACYclovir (VALTREX) 1 gram tablet Take 1,000 mg by mouth daily.  mometasone-formoterol (DULERA) 100-5 mcg/actuation HFA inhaler Take 2 puffs by inhalation two (2) times a day.  mesalamine EC (ASACOL) 400 mg EC tablet Take 800 mg by mouth three (3) times daily.  hyoscyamine SL (LEVSIN/SL) 0.125 mg SL tablet 0.125 mg by SubLINGual route every four (4) hours.  pirbuterol (MAXAIR AUTOHALER) 200 mcg/Inhalation inhaler Take 2 Puffs by inhalation four (4) times daily as needed.  Omeprazole delayed release (PRILOSEC D/R) 20 mg tablet Take 40 mg by mouth daily.  estradiol (VAGIFEM) 25 mcg vaginal tablet Insert 25 mcg into vagina two (2) days a week.  [DISCONTINUED] Sutab 1.479-0.188- 0.225 gram tab TAKE AS DIRECTED BEFORE COLONOSCOPY (Patient not taking: Reported on 7/19/2022)      metoprolol succinate (TOPROL-XL) 50 mg XL tablet Take 1 Tablet by mouth daily. (Patient not taking: Reported on 7/19/2022) 90 Tablet 0    [DISCONTINUED] hydrALAZINE (APRESOLINE) 50 mg tablet Take 1 Tablet by mouth three (3) times daily. (Patient not taking: Reported on 6/23/2022) 270 Tablet 3    [DISCONTINUED] Bifidobacterium Infantis (Align) 4 mg cap Take 1 Tablet by mouth daily. (Patient not taking: Reported on 7/19/2022)      EPINEPHrine (EPIPEN) 0.3 mg/0.3 mL injection INJECT CONTENTS OF 1 PEN AS NEEDED FOR ALLERGIC REACTION AS DIRECTED      nystatin-triamcinolone (MYCOLOG II) topical cream as needed.  [DISCONTINUED] CALCIUM CARBONATE (CALCIUM 300 PO) Take 600 mg by mouth daily. (Patient not taking: Reported on 7/19/2022)      [DISCONTINUED] biotin 2,500 mcg Tab Take 5,000 mcg by mouth daily. (Patient not taking: Reported on 7/19/2022)       No current facility-administered medications on file prior to visit.         Objective:     Vitals:    07/19/22 0754 07/19/22 0757 07/19/22 0759   BP: (!) 184/90 (!) 172/73 139/71   Pulse: (!) 55 (!) 50 (!) 50   Resp: 16 Temp: 97.1 °F (36.2 °C)     TempSrc: Temporal     SpO2: 97%     Weight: 142 lb 6.4 oz (64.6 kg)     Height: 5' 1.5\" (1.562 m)       Physical Examination:  General appearance - alert, well appearing, and in no distress  Eyes -sclera anicteric  Neck - supple, no significant adenopathy, no thyromegaly, no bruits  Chest - clear to auscultation, no wheezes, rales or rhonchi, symmetric air entry  Heart - normal rate, regular rhythm, normal S1, S2, no murmurs, rubs, clicks or gallops  Neurological - alert, oriented, no focal findings or movement disorder noted  Extremities-no edema  Psych-normal mood and affect    On this date 07/19/2022 I have spent 20 minutes reviewing previous notes, test results and face to face with the patient discussing the diagnosis and importance of compliance with the treatment plan as well as documenting on the day of the visit. An electronic signature was used to authenticate this note.   -- Evita Ulrich MD

## 2022-07-19 NOTE — PATIENT INSTRUCTIONS
We are making a few additional adjustments to your blood pressure medications:    1. Stop hydralazine (you were only taking 25 mg twice daily) due to low blood pressure symptoms    2. Continue taking valsartan/HCTZ 320/25 mg every morning, continue amlodipine 10 mg daily, and continue Bystolic 10 mg daily. The day of your colonoscopy, please do not take your valsartan/HCTZ but take your amlodipine and Bystolic the day prior to your colonoscopy and the evening on the day of your colonoscopy. You may restart your valsartan/HCTZ the next morning.

## 2022-08-29 ENCOUNTER — VIRTUAL VISIT (OUTPATIENT)
Dept: FAMILY MEDICINE CLINIC | Age: 77
End: 2022-08-29
Payer: COMMERCIAL

## 2022-08-29 DIAGNOSIS — I10 ESSENTIAL HYPERTENSION: Primary | ICD-10-CM

## 2022-08-29 PROCEDURE — 99441 PR PHYS/QHP TELEPHONE EVALUATION 5-10 MIN: CPT | Performed by: FAMILY MEDICINE

## 2022-08-29 RX ORDER — VALSARTAN AND HYDROCHLOROTHIAZIDE 160; 12.5 MG/1; MG/1
1 TABLET, FILM COATED ORAL DAILY
Qty: 90 TABLET | Refills: 1 | Status: SHIPPED | OUTPATIENT
Start: 2022-08-29 | End: 2022-09-06 | Stop reason: SINTOL

## 2022-08-29 RX ORDER — NEBIVOLOL 10 MG/1
10 TABLET ORAL DAILY
COMMUNITY

## 2022-08-29 RX ORDER — VALSARTAN AND HYDROCHLOROTHIAZIDE 320; 25 MG/1; MG/1
1 TABLET, FILM COATED ORAL DAILY
Qty: 90 TABLET | Refills: 0 | Status: CANCELLED | OUTPATIENT
Start: 2022-08-29

## 2022-08-29 NOTE — PROGRESS NOTES
Chief Complaint   Patient presents with    Hypertension     Follow-up    1. Have you been to the ER, urgent care clinic since your last visit? Hospitalized since your last visit? No    2. Have you seen or consulted any other health care providers outside of the [address] [place] [address] since your last visit? Include any pap smears or colon screening.  No

## 2022-08-29 NOTE — PROGRESS NOTES
Rudy Zapata is a 69 y.o. female who was seen by synchronous (real-time) audio-video technology on 8/29/2022 for Hypertension (Follow-up)      Assessment/ Plan:   Diagnoses and all orders for this visit:    1. Essential hypertension-continue with lower dose of valsartan/HCTZ of 160/12.5 mg daily. May need further adjustments after switching from Bystolic to metoprolol so we will have patient follow-up in 6 weeks to recheck blood pressure in office and review home readings. -     valsartan-hydroCHLOROthiazide (DIOVAN-HCT) 160-12.5 mg per tablet; Take 1 Tablet by mouth daily. I spent at least 7 minutes on this audio only visit with this established patient. Follow-up and Dispositions    Return in about 6 weeks (around 10/10/2022), or if symptoms worsen or fail to improve. Subjective:     Concerns with BP feeling too low recently since being on valsartan/HCTZ 320/25 mg. She had to cut her Valsartan/HCTZ in 1/2 and is feeling better now. BP readings at home are now running 110s/60-70s with no further symptoms. She has continued on Bystolic and is planning to switch over to metoprolol when she runs out.     ROS  Gen - no fever/chills  Resp - no dyspnea or cough  CV - no chest pain or ZACARIAS  Rest per HPI    Past Medical History:   Diagnosis Date    Allergic rhinitis 6/28/2017    Asthma 6/28/2017    Impressio: continue nebs and Dulera, prednisone taper    Bradycardia 6/28/2017    Impression: asymptomatic, continue Bystolic    Cataract, bilateral 6/28/2017    Impression: low risk for elective surgical procedure, proceed without further risk stratification, EKG shows sinus domingo without acute ST-T changes which is her baseline    Crohn's disease ([facility] [address] 6/28/2017    Impression: GI following    Degenerative arthritis 6/28/2017    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, 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    Hair loss 6/28/2017    Impression: refer to derm    Herpes Dx 2015    no outbreak as of 5/2/16    Hyperlipidemia 6/28/2017    Hypertension     Hypokalemia 6/28/2017    MRSA (methicillin resistant staph aureus) culture positive \"many years ago\" as of 5/2/16    pt states + nasal swab \"many years ago\", Tx and no + since; UNCONFIRMED    Osteoporosis 6/28/2017    Pneumonia 6/28/2017    Impression:  Completed course of antibiotics, clinically resolved Status is Inactive    Syncope     Moderna vaccine    Traumatic hematoma of scalp 6/28/2017    Status is Inactive    Ulcerative colitis ([facility] [address] 6/28/2017     Past Surgical History:   Procedure Laterality Date    HX CATARACT REMOVAL Right 3/28/16    HX MOHS PROCEDURES Right     HX ORTHOPAEDIC Left     left knee sx    HX ORTHOPAEDIC Right     trigger release      HX TUBAL LIGATION          Objective:     Patient-Reported Vitals 8/29/2022   Patient-Reported Pulse 51   Patient-Reported Systolic  536   Patient-Reported Diastolic 67      No exam due to audio only    We discussed the expected course, resolution and complications of the diagnosis(es) in detail. Medication risks, benefits, costs, interactions, and alternatives were discussed as indicated. I advised her to contact the office if her condition worsens, changes or fails to improve as anticipated. She expressed understanding with the diagnosis(es) and plan. Jian Wang, was evaluated through a synchronous (real-time) audio-video encounter. The patient (or guardian if applicable) is aware that this is a billable service, which includes applicable co-pays. This Virtual Visit was conducted with patient's (and/or legal guardian's) consent. The visit was conducted pursuant to the emergency declaration under the [address] [place] [address], [address] [place] [address] authority and the [employer] General Act. Patient identification was verified, and a caregiver was present when appropriate.   The patient was located at: Home: [facility] [address] [facility] [place] [id number]  The provider was located at: Home: [unfilled]        Darryn De Jesus MD

## 2022-08-31 ENCOUNTER — TELEPHONE (OUTPATIENT)
Dept: FAMILY MEDICINE CLINIC | Age: 77
End: 2022-08-31

## 2022-08-31 NOTE — TELEPHONE ENCOUNTER
Pt feels like throat closing since starting water pill with BP medication -     She called 911 last week     She did a nebulizer treatment today and it got better     Pls advise [phone] or C [phone]

## 2022-09-06 ENCOUNTER — OFFICE VISIT (OUTPATIENT)
Dept: FAMILY MEDICINE CLINIC | Age: 77
End: 2022-09-06
Payer: COMMERCIAL

## 2022-09-06 VITALS
RESPIRATION RATE: 16 BRPM | WEIGHT: 143.4 LBS | DIASTOLIC BLOOD PRESSURE: 71 MMHG | BODY MASS INDEX: 26.39 KG/M2 | HEIGHT: 62 IN | TEMPERATURE: 97.5 F | HEART RATE: 50 BPM | SYSTOLIC BLOOD PRESSURE: 153 MMHG | OXYGEN SATURATION: 98 %

## 2022-09-06 DIAGNOSIS — R13.10 DYSPHAGIA, UNSPECIFIED TYPE: ICD-10-CM

## 2022-09-06 DIAGNOSIS — I10 ESSENTIAL HYPERTENSION: Primary | ICD-10-CM

## 2022-09-06 PROCEDURE — G8536 NO DOC ELDER MAL SCRN: HCPCS | Performed by: FAMILY MEDICINE

## 2022-09-06 PROCEDURE — G8427 DOCREV CUR MEDS BY ELIG CLIN: HCPCS | Performed by: FAMILY MEDICINE

## 2022-09-06 PROCEDURE — G8753 SYS BP > OR = 140: HCPCS | Performed by: FAMILY MEDICINE

## 2022-09-06 PROCEDURE — G8432 DEP SCR NOT DOC, RNG: HCPCS | Performed by: FAMILY MEDICINE

## 2022-09-06 PROCEDURE — 1090F PRES/ABSN URINE INCON ASSESS: CPT | Performed by: FAMILY MEDICINE

## 2022-09-06 PROCEDURE — 1123F ACP DISCUSS/DSCN MKR DOCD: CPT | Performed by: FAMILY MEDICINE

## 2022-09-06 PROCEDURE — G8417 CALC BMI ABV UP PARAM F/U: HCPCS | Performed by: FAMILY MEDICINE

## 2022-09-06 PROCEDURE — 99213 OFFICE O/P EST LOW 20 MIN: CPT | Performed by: FAMILY MEDICINE

## 2022-09-06 PROCEDURE — G8754 DIAS BP LESS 90: HCPCS | Performed by: FAMILY MEDICINE

## 2022-09-06 PROCEDURE — 1101F PT FALLS ASSESS-DOCD LE1/YR: CPT | Performed by: FAMILY MEDICINE

## 2022-09-06 NOTE — PROGRESS NOTES
Greg Gutierrez (: 1945) is a 69 y.o. female, established patient, here for evaluation of the following chief complaint(s):  Dysphagia (Started with new medication HCTZ)       ASSESSMENT/PLAN:  Diagnoses and all orders for this visit:    1. Essential hypertension - BP too high today off meds. Adjusting and follow up in 4 weeks    2. Dysphagia, unspecified type - to GI  -     REFERRAL TO GASTROENTEROLOGY        Return in about 4 weeks (around 10/4/2022), or if symptoms worsen or fail to improve. Subjective:   78 yo AAF with PMH sig for Crohns Dz, HTN, HLD, asymptomatic bradycardia and asthma here to f/u on HTN. Since last appt, had feeling of throat closing. She reports starting Valsartan/HCTZ lower dose of a few days prior to this - was on Valsartan/HCTZ 320/25 mg daily x 2 months prior to this. Hyperlipidemia & HTN -  Patient is currently taking Bystolic 10 mg daily, amlodipine 10 mg daily, Valsartan/HCTZ 320/25 mg x 1/2 tab - stopped this a few days ago. Home BP staying in 140s/60s  She stopped taking hydralazine (was on 50 mg 3 times daily but had hypotensive symptoms so decreased to 25 mg twice daily at last appointment)  With plans to switch her to metoprolol from nebivolol due to cost but she has continued as she still has nebivolol pills and was waiting until she ran out to make the switch. Unable to melida spironolactone d/t hives years ago. Has been seeing Cardiology for this. Had recent stress test and echo with cardio 2 months ago with no sig concerns. Echo with nml EF and mild MVR. No TIA's, no chest pain on exertion, no swelling of ankles. Exercising - Minimal, prev was more active  Dieting - Yes, arminda with Crohns  Smoking - Noexposures.      Lab Results   Component Value Date/Time    Cholesterol, total 248 (H) 2022 08:22 AM    HDL Cholesterol 91 2022 08:22 AM    LDL, calculated 147 (H) 2022 08:22 AM    LDL, calculated 161.4 (H) 2021 11:19 AM Triglyceride 64 05/23/2022 08:22 AM         ROS  Gen - no fever/chills  Resp - no dyspnea or cough  CV - no chest pain or ZACARIAS  Rest per HPI    Past Medical History:   Diagnosis Date    Allergic rhinitis 6/28/2017    Asthma 6/28/2017    Impressio: continue nebs and Dulera, prednisone taper    Bradycardia 6/28/2017    Impression: asymptomatic, continue Bystolic    Cataract, bilateral 6/28/2017    Impression: low risk for elective surgical procedure, proceed without further risk stratification, EKG shows sinus domingo without acute ST-T changes which is her baseline    Crohn's disease ([facility] [address] 6/28/2017    Impression: GI following    Degenerative arthritis 6/28/2017    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, 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    Hair loss 6/28/2017    Impression: refer to derm    Herpes Dx 2015    no outbreak as of 5/2/16    Hyperlipidemia 6/28/2017    Hypertension     Hypokalemia 6/28/2017    MRSA (methicillin resistant staph aureus) culture positive \"many years ago\" as of 5/2/16    pt states + nasal swab \"many years ago\", Tx and no + since; UNCONFIRMED    Osteoporosis 6/28/2017    Pneumonia 6/28/2017    Impression:  Completed course of antibiotics, clinically resolved Status is Inactive    Syncope     Moderna vaccine    Traumatic hematoma of scalp 6/28/2017    Status is Inactive    Ulcerative colitis ([facility] [address] 6/28/2017     Past Surgical History:   Procedure Laterality Date    HX CATARACT REMOVAL Right 3/28/16    HX MOHS PROCEDURES Right     HX ORTHOPAEDIC Left     left knee sx    HX ORTHOPAEDIC Right     trigger release      HX TUBAL LIGATION          Objective:     Blood pressure (!) 153/71, pulse (!) 50, temperature 97.5 °F (36.4 °C), temperature source Temporal, resp. rate 16, height 5' 1.5\" (1.562 m), weight 143 lb 6.4 oz (65 kg), SpO2 98 %.     Physical Exam  General appearance - alert, well appearing, and in no distress  Eyes -sclera anicteric  Neck - supple, no significant adenopathy, no thyromegaly  Chest - clear to auscultation, no wheezes, rales or rhonchi, symmetric air entry  Heart - normal rate, regular rhythm, normal S1, S2, no murmurs, rubs, clicks or gallops  Neurological - alert, oriented, normal speech, no focal findings or movement disorder noted  Extr - no edema  Psych - normal mood and affect      On this date 09/06/2022 I have spent 20 minutes reviewing previous notes, test results and face to face with the patient discussing the diagnosis and importance of compliance with the treatment plan as well as documenting on the day of the visit. An electronic signature was used to authenticate this note.   -- Chrissy Escobar MD

## 2022-09-06 NOTE — PROGRESS NOTES
Chief Complaint   Patient presents with    Dysphagia     Started with new medication HCTZ    1. Have you been to the ER, urgent care clinic since your last visit? Hospitalized since your last visit? No    2. Have you seen or consulted any other health care providers outside of the [address] [place] [address] since your last visit? Include any pap smears or colon screening.  No     Called 911 but did not go to ER ,used Nebulizer treatment

## 2022-10-17 ENCOUNTER — VIRTUAL VISIT (OUTPATIENT)
Dept: FAMILY MEDICINE CLINIC | Age: 77
End: 2022-10-17
Payer: COMMERCIAL

## 2022-10-17 DIAGNOSIS — I10 ESSENTIAL HYPERTENSION: Primary | ICD-10-CM

## 2022-10-17 DIAGNOSIS — R94.4 DECREASED GFR: ICD-10-CM

## 2022-10-17 DIAGNOSIS — Z79.899 ENCOUNTER FOR LONG-TERM (CURRENT) USE OF MEDICATIONS: ICD-10-CM

## 2022-10-17 PROCEDURE — 99443 PR PHYS/QHP TELEPHONE EVALUATION 21-30 MIN: CPT | Performed by: FAMILY MEDICINE

## 2022-10-17 RX ORDER — VALSARTAN AND HYDROCHLOROTHIAZIDE 160; 12.5 MG/1; MG/1
1 TABLET, FILM COATED ORAL DAILY
COMMUNITY
End: 2022-10-26 | Stop reason: SDUPTHER

## 2022-10-17 NOTE — PROGRESS NOTES
Chief Complaint   Patient presents with    Hypertension     Follow-up    1. Have you been to the ER, urgent care clinic since your last visit? Hospitalized since your last visit? No    2. Have you seen or consulted any other health care providers outside of the [address] [place] [address] since your last visit? Include any pap smears or colon screening.  No

## 2022-10-17 NOTE — PROGRESS NOTES
Jose Mosley is a 69 y.o. female who was seen by synchronous (real-time) audio-video technology on 10/17/2022 for Hypertension (Follow-up)      Assessment/ Plan: Home BP readings doing well. To continue monitoring and will especially start monitoring more closely after switching off of nebivolol over to metoprolol. Also reviewed hyperlipidemia and patient declines statin medication today. Reviewed correlation to heart disease and would still like to plan for statin medication if lipids are running too high and 5/2023. She will work on exercise and diet    Plans to get flu and pneumonia vaccine at her pharmacy and will plan for COVID booster 3 to 4 weeks after this. Diagnoses and all orders for this visit:    1. Essential hypertension  -     METABOLIC PANEL, BASIC; Future    2. Decreased GFR  -     METABOLIC PANEL, BASIC; Future    3. Encounter for long-term (current) use of medications  -     METABOLIC PANEL, BASIC; Future      I spent at least 21 minutes on this visit with this established patient. Follow-up and Dispositions    Return in about 3 months (around 1/17/2023), or if symptoms worsen or fail to improve. Subjective:   Pt is a 69 y.o. female with PMH sig for HTN, HLD, Crohn's, asthma who presents for HTN follow up. Hyperlipidemia & HTN  Tracking BP and seeing 110-130s/50-70s  Ct on Diovan/HCT, Amlodipine, and Bystolic  She is not taking a statin medication  Pt is doing well on current meds with no medication side effects noted  No new myalgias, no joint pains, no weakness  No TIA's, no chest pain on exertion, no dyspnea on exertion, no swelling of ankles.   Exercising - Minimal  Dieting - Yes  Smoking - No     Lab Results   Component Value Date/Time    Cholesterol, total 248 (H) 05/23/2022 08:22 AM    HDL Cholesterol 91 05/23/2022 08:22 AM    LDL, calculated 147 (H) 05/23/2022 08:22 AM    LDL, calculated 161.4 (H) 11/17/2021 11:19 AM    Triglyceride 64 05/23/2022 08:22 AM Questions about getting COVID booster. Had a syncopal episode after getting her second Moderna vaccine. She was able to get her third and fourth Moderna vaccines without incident. She would like to have the newer omicron specific vaccine and wonders if it is time.     ROS  Gen - no fever/chills  Resp - no dyspnea or cough  CV - no chest pain or ZACARIAS  Rest per HPI    Past Medical History:   Diagnosis Date    Allergic rhinitis 06/28/2017    Asthma 06/28/2017    Impressio: continue nebs and Dulera, prednisone taper    Bradycardia 06/28/2017    Impression: asymptomatic, continue Bystolic    Cataract, bilateral 06/28/2017    Impression: low risk for elective surgical procedure, proceed without further risk stratification, EKG shows sinus domingo without acute ST-T changes which is her baseline    Crohn's disease ([facility] [address] 06/28/2017    Impression: GI following    Degenerative arthritis 06/28/2017    Hair loss 06/28/2017    Impression: refer to derm    Herpes Dx 2015    no outbreak as of 5/2/16    Hyperlipidemia 06/28/2017    Hypertension     Hypokalemia 06/28/2017    LTBI (latent tuberculosis infection)     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, 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 - had neg PPD after this    MRSA (methicillin resistant staph aureus) culture positive \"many years ago\" as of 5/2/16    pt states + nasal swab \"many years ago\", Tx and no + since; UNCONFIRMED    Osteoporosis 06/28/2017    Pneumonia 06/28/2017    Impression:  Completed course of antibiotics, clinically resolved Status is Inactive    Syncope     Moderna vaccine     Past Surgical History:   Procedure Laterality Date    HX CATARACT REMOVAL Right 3/28/16    HX MOHS PROCEDURES Right     HX ORTHOPAEDIC Left     left knee sx    HX ORTHOPAEDIC Right     trigger release      HX TUBAL LIGATION          Objective:     Patient-Reported Vitals 10/17/2022   Patient-Reported Pulse -   Patient-Reported Systolic  968   Patient-Reported Diastolic 80   No exam due to audio only      We discussed the expected course, resolution and complications of the diagnosis(es) in detail. Medication risks, benefits, costs, interactions, and alternatives were discussed as indicated. I advised her to contact the office if her condition worsens, changes or fails to improve as anticipated. She expressed understanding with the diagnosis(es) and plan. Jonatan Odonnell, was evaluated through audio only encounter. The patient (or guardian if applicable) is aware that this is a billable service, which includes applicable co-pays. This Virtual Visit was conducted with patient's (and/or legal guardian's) consent. The visit was conducted pursuant to the emergency declaration under the [facility], [address] [place] [address] authority and the [employer] General Act. Patient identification was verified, and a caregiver was present when appropriate. The patient was located at: Home: [address] [facility] [phone]  The provider was located at:  [facility] [place] [address]        Kodi Castano MD

## 2022-10-21 ENCOUNTER — TRANSCRIBE ORDER (OUTPATIENT)
Dept: SCHEDULING | Age: 77
End: 2022-10-21

## 2022-10-21 DIAGNOSIS — K50.80 CROHN'S DISEASE, SMALL AND LARGE INTESTINE (HCC): Primary | ICD-10-CM

## 2022-10-25 LAB
BUN SERPL-MCNC: 18 MG/DL (ref 8–27)
BUN/CREAT SERPL: 16 (ref 12–28)
CALCIUM SERPL-MCNC: 9.8 MG/DL (ref 8.7–10.3)
CHLORIDE SERPL-SCNC: 101 MMOL/L (ref 96–106)
CO2 SERPL-SCNC: 26 MMOL/L (ref 20–29)
CREAT SERPL-MCNC: 1.1 MG/DL (ref 0.57–1)
EGFR: 52 ML/MIN/1.73
GLUCOSE SERPL-MCNC: 87 MG/DL (ref 70–99)
INTERPRETATION: NORMAL
POTASSIUM SERPL-SCNC: 4.4 MMOL/L (ref 3.5–5.2)
SODIUM SERPL-SCNC: 141 MMOL/L (ref 134–144)

## 2022-10-26 RX ORDER — VALSARTAN AND HYDROCHLOROTHIAZIDE 160; 12.5 MG/1; MG/1
1 TABLET, FILM COATED ORAL DAILY
Qty: 90 TABLET | Refills: 0 | Status: SHIPPED | OUTPATIENT
Start: 2022-10-26

## 2022-10-26 NOTE — TELEPHONE ENCOUNTER
Patient called, requesting a refill for     Valsartan hydrochlorothiazide 160-12.5    [facility]    Patient's phone  [phone]

## 2022-11-04 ENCOUNTER — HOSPITAL ENCOUNTER (OUTPATIENT)
Dept: CT IMAGING | Age: 77
Discharge: HOME | End: 2022-11-04
Attending: NURSE PRACTITIONER
Payer: COMMERCIAL

## 2022-11-04 DIAGNOSIS — K50.80 CROHN'S DISEASE, SMALL AND LARGE INTESTINE (HCC): ICD-10-CM

## 2022-11-04 LAB — CREAT BLD-MCNC: 1.4 MG/DL (ref 0.6–1.3)

## 2022-11-04 PROCEDURE — 74011000636 HC RX REV CODE- 636: Performed by: NURSE PRACTITIONER

## 2022-11-04 PROCEDURE — 82565 ASSAY OF CREATININE: CPT

## 2022-11-04 PROCEDURE — 74177 CT ABD & PELVIS W/CONTRAST: CPT

## 2022-11-04 PROCEDURE — 74011000250 HC RX REV CODE- 250: Performed by: NURSE PRACTITIONER

## 2022-11-04 RX ORDER — BARIUM SULFATE 20 MG/ML
900 SUSPENSION ORAL
Status: COMPLETED | OUTPATIENT
Start: 2022-11-04 | End: 2022-11-04

## 2022-11-04 RX ADMIN — IOPAMIDOL 100 ML: 755 INJECTION, SOLUTION INTRAVENOUS at 09:15

## 2022-11-04 RX ADMIN — BARIUM SULFATE 900 ML: 21 SUSPENSION ORAL at 09:15

## 2023-01-12 RX ORDER — VALSARTAN AND HYDROCHLOROTHIAZIDE 160; 12.5 MG/1; MG/1
TABLET, FILM COATED ORAL
Qty: 90 TABLET | Refills: 0 | Status: SHIPPED | OUTPATIENT
Start: 2023-01-12

## 2023-01-18 ENCOUNTER — OFFICE VISIT (OUTPATIENT)
Dept: FAMILY MEDICINE CLINIC | Age: 78
End: 2023-01-18
Payer: COMMERCIAL

## 2023-01-18 VITALS
WEIGHT: 133.2 LBS | RESPIRATION RATE: 16 BRPM | HEART RATE: 57 BPM | HEIGHT: 62 IN | SYSTOLIC BLOOD PRESSURE: 134 MMHG | DIASTOLIC BLOOD PRESSURE: 60 MMHG | OXYGEN SATURATION: 98 % | TEMPERATURE: 98.7 F | BODY MASS INDEX: 24.51 KG/M2

## 2023-01-18 DIAGNOSIS — I10 ESSENTIAL HYPERTENSION: Primary | ICD-10-CM

## 2023-01-18 DIAGNOSIS — Z79.899 ENCOUNTER FOR LONG-TERM (CURRENT) USE OF MEDICATIONS: ICD-10-CM

## 2023-01-18 DIAGNOSIS — E78.2 MIXED HYPERLIPIDEMIA: ICD-10-CM

## 2023-01-18 DIAGNOSIS — M85.89 OSTEOPENIA OF MULTIPLE SITES: ICD-10-CM

## 2023-01-18 DIAGNOSIS — K50.80 CROHN'S DISEASE OF BOTH SMALL AND LARGE INTESTINE WITHOUT COMPLICATION (HCC): ICD-10-CM

## 2023-01-18 DIAGNOSIS — N18.31 STAGE 3A CHRONIC KIDNEY DISEASE (HCC): ICD-10-CM

## 2023-01-18 RX ORDER — METOPROLOL SUCCINATE 50 MG/1
50 TABLET, EXTENDED RELEASE ORAL DAILY
Qty: 90 TABLET | Refills: 3 | Status: SHIPPED | OUTPATIENT
Start: 2023-01-18

## 2023-01-18 RX ORDER — VALSARTAN AND HYDROCHLOROTHIAZIDE 160; 12.5 MG/1; MG/1
TABLET, FILM COATED ORAL
Qty: 90 TABLET | Refills: 3 | Status: SHIPPED | OUTPATIENT
Start: 2023-01-18

## 2023-01-18 RX ORDER — AMLODIPINE BESYLATE 10 MG/1
10 TABLET ORAL DAILY
Qty: 90 TABLET | Refills: 3 | Status: SHIPPED | OUTPATIENT
Start: 2023-01-18

## 2023-01-18 NOTE — PROGRESS NOTES
Abe Bledsoe (: 1945) is a 69 y.o. female, established patient, here for evaluation of the following chief complaint(s):  Hypertension (Follow-up)       ASSESSMENT/PLAN:  Diagnoses and all orders for this visit:    1. Essential hypertension - near goal, home readings mostly controlled  -     valsartan-hydroCHLOROthiazide (DIOVAN-HCT) 160-12.5 mg per tablet; TAKE 1 TABLET DAILY  -     metoprolol succinate (TOPROL-XL) 50 mg XL tablet; Take 1 Tablet by mouth daily. -     amLODIPine (NORVASC) 10 mg tablet; Take 1 Tablet by mouth daily.  -     METABOLIC PANEL, COMPREHENSIVE; Future    2. Crohn's disease of both small and large intestine without complication ([facility] [address] - stable on mesalamine, ct following with GI    3. Stage 3a chronic kidney disease ([facility] [address] - newer issue, goal /80, no hx of DM, no obesity, avoids NSAIDs, ct on ARB, drinking plenty of water  -     METABOLIC PANEL, COMPREHENSIVE; Future  -     PHOSPHORUS; Future  -     PTH INTACT; Future  -     MICROALBUMIN, UR, RAND W/ MICROALB/CREAT RATIO; Future    4. Encounter for long-term (current) use of medications  -     METABOLIC PANEL, COMPREHENSIVE; Future  -     PHOSPHORUS; Future  -     PTH INTACT; Future  -     MICROALBUMIN, UR, RAND W/ MICROALB/CREAT RATIO; Future    5. Osteopenia of multiple sites - stable on Ca/Vit D. Would consider starting Fosamax     6. HLD - running slightly high and would consider adding statin in future if able to tolerate. Return in about 3 months (around 2023). Subjective:   76 yo AAF with PMH sig for Crohns Dz, HTN, HLD, asymptomatic bradycardia, osteopenia, and asthma here to f/u on HTN. Since last appt, has done well with med changes for HTN. She is up to date on her routine care. Last mammo 2022  DEXA 2022 at [facility] - osteopenia. Ct Ca/Vit D and weight bearing exercise.     Hyperlipidemia & HTN  Patient is currently taking Metoprolol 50 mg (switched from Bystolic 10 mg daily d/t cost), amlodipine 10 mg daily, Valsartan/HCTZ 160/12.5 mg.  Prev was on   Home BP mostly controlled with some down to 100s/60s and some up to 160s/80s  She stopped taking hydralazine (was on 50 mg 3 times daily but had hypotensive symptoms so decreased to 25 mg twice daily prev)  Unable to melida spironolactone d/t hives years ago. Has been seeing Cardiology for this. Had nuclear stress test in 3/2022 that was low risk. Had echo as well recently with no sig concerns. Echo with nml EF and mild MVR. No TIA's, no chest pain on exertion, no swelling of ankles. Exercising - Minimal, prev was more active  Dieting - Yes, arminda with Crohns  Smoking - Noexposures. Lab Results   Component Value Date/Time    Cholesterol, total 248 (H) 05/23/2022 08:22 AM    HDL Cholesterol 91 05/23/2022 08:22 AM    LDL, calculated 147 (H) 05/23/2022 08:22 AM    LDL, calculated 161.4 (H) 11/17/2021 11:19 AM    Triglyceride 64 05/23/2022 08:22 AM     ROS  Gen - no fever/chills  Eyes - hx of cataracts, ct on restasis  Resp - no dyspnea or cough  CV - no chest pain or ZACARIAS  GI - Crohns stable, doing well on mesalamine. Rare use of prednisone.   Rest per HPI    Past Medical History:   Diagnosis Date    Allergic rhinitis 06/28/2017    Asthma 06/28/2017    Impressio: continue nebs and Dulera, prednisone taper    Bradycardia 06/28/2017    Impression: asymptomatic, continue Bystolic    Cataract, bilateral 06/28/2017    Impression: low risk for elective surgical procedure, proceed without further risk stratification, EKG shows sinus domingo without acute ST-T changes which is her baseline    Crohn's disease ([facility] [address] 06/28/2017    Impression: GI following    Degenerative arthritis 06/28/2017    Hair loss 06/28/2017    Impression: refer to derm    Herpes Dx 2015    no outbreak as of 5/2/16    Hyperlipidemia 06/28/2017    Hypertension     Hypokalemia 06/28/2017    LTBI (latent tuberculosis infection)     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, 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 - had neg PPD after this    MRSA (methicillin resistant staph aureus) culture positive \"many years ago\" as of 5/2/16    pt states + nasal swab \"many years ago\", Tx and no + since; UNCONFIRMED    Osteoporosis 06/28/2017    Pneumonia 06/28/2017    Impression:  Completed course of antibiotics, clinically resolved Status is Inactive    Syncope     Moderna vaccine     Past Surgical History:   Procedure Laterality Date    HX CATARACT REMOVAL Right 3/28/16    HX MOHS PROCEDURES Right     HX ORTHOPAEDIC Left     left knee sx    HX ORTHOPAEDIC Right     trigger release      HX TUBAL LIGATION          Objective:     Blood pressure 134/60, pulse (!) 57, temperature 98.7 °F (37.1 °C), temperature source Oral, resp. rate 16, height 5' 1.5\" (1.562 m), weight 133 lb 3.2 oz (60.4 kg), SpO2 98 %. Physical Exam  General appearance - alert, well appearing, and in no distress  Eyes -sclera anicteric  Neck - supple, no significant adenopathy, no thyromegaly  Chest - clear to auscultation, no wheezes, rales or rhonchi, symmetric air entry  Heart - normal rate, regular rhythm, normal S1, S2, no murmurs, rubs, clicks or gallops  Neurological - alert, oriented, normal speech, no focal findings or movement disorder noted  Extr - no edema  Psych - normal mood and affect    On this date 01/18/2023 I have spent 30 minutes reviewing previous notes, test results and face to face with the patient discussing the diagnosis and importance of compliance with the treatment plan as well as documenting on the day of the visit. An electronic signature was used to authenticate this note.   -- Pili Rodney MD

## 2023-01-18 NOTE — PROGRESS NOTES
Chief Complaint   Patient presents with    Hypertension     Follow-up    1. Have you been to the ER, urgent care clinic since your last visit? Hospitalized since your last visit? No    2. Have you seen or consulted any other health care providers outside of the [address] [place] [address] since your last visit? Include any pap smears or colon screening.  No

## 2023-01-20 LAB
ALBUMIN SERPL-MCNC: 4.8 G/DL (ref 3.7–4.7)
ALBUMIN/CREAT UR: 21 MG/G CREAT (ref 0–29)
ALBUMIN/GLOB SERPL: 1.8 {RATIO} (ref 1.2–2.2)
ALP SERPL-CCNC: 81 IU/L (ref 44–121)
ALT SERPL-CCNC: 15 IU/L (ref 0–32)
AST SERPL-CCNC: 40 IU/L (ref 0–40)
BILIRUB SERPL-MCNC: 0.2 MG/DL (ref 0–1.2)
BUN SERPL-MCNC: 22 MG/DL (ref 8–27)
BUN/CREAT SERPL: 18 (ref 12–28)
CALCIUM SERPL-MCNC: 10 MG/DL (ref 8.7–10.3)
CHLORIDE SERPL-SCNC: 101 MMOL/L (ref 96–106)
CO2 SERPL-SCNC: 26 MMOL/L (ref 20–29)
CREAT SERPL-MCNC: 1.2 MG/DL (ref 0.57–1)
CREAT UR-MCNC: 19.7 MG/DL
EGFR: 47 ML/MIN/1.73
GLOBULIN SER CALC-MCNC: 2.7 G/DL (ref 1.5–4.5)
GLUCOSE SERPL-MCNC: 87 MG/DL (ref 70–99)
INTERPRETATION: NORMAL
MICROALBUMIN UR-MCNC: 4.1 UG/ML
PHOSPHATE SERPL-MCNC: 3.9 MG/DL (ref 3–4.3)
POTASSIUM SERPL-SCNC: 4.1 MMOL/L (ref 3.5–5.2)
PROT SERPL-MCNC: 7.5 G/DL (ref 6–8.5)
PTH-INTACT SERPL-MCNC: 62 PG/ML (ref 15–65)
SODIUM SERPL-SCNC: 141 MMOL/L (ref 134–144)

## 2023-03-31 ENCOUNTER — DOCUMENTATION ONLY (OUTPATIENT)
Dept: FAMILY MEDICINE CLINIC | Age: 78
End: 2023-03-31

## 2023-03-31 NOTE — PROGRESS NOTES
Patient called stating her BP was 87/56 this morning but after eating repeat BP was 132/77. Advised per Dr Angie Rodas to come by office for BP check and if BP continuing to be low can stop Metoprolol and follow-up as scheduled with Dr Angie Rodas.

## 2023-04-04 ENCOUNTER — DOCUMENTATION ONLY (OUTPATIENT)
Dept: FAMILY MEDICINE CLINIC | Age: 78
End: 2023-04-04

## 2023-04-04 NOTE — PROGRESS NOTES
Patient came by office for BP check . /72. She is only taking 1/2 tablet of metoprolol and has taken medication today. Readings for home BP ranging from 112/61 to 132/84. Scheduled appointment with Dr Avila Perla in office and she will bring Home BP cuff and medications for appointment.

## 2023-04-17 ENCOUNTER — OFFICE VISIT (OUTPATIENT)
Dept: FAMILY MEDICINE CLINIC | Age: 78
End: 2023-04-17
Payer: COMMERCIAL

## 2023-04-17 VITALS
BODY MASS INDEX: 27.34 KG/M2 | RESPIRATION RATE: 20 BRPM | OXYGEN SATURATION: 97 % | WEIGHT: 144.8 LBS | DIASTOLIC BLOOD PRESSURE: 67 MMHG | SYSTOLIC BLOOD PRESSURE: 147 MMHG | HEART RATE: 55 BPM | HEIGHT: 61 IN | TEMPERATURE: 97.7 F

## 2023-04-17 DIAGNOSIS — N18.31 STAGE 3A CHRONIC KIDNEY DISEASE (HCC): ICD-10-CM

## 2023-04-17 DIAGNOSIS — K50.80 CROHN'S DISEASE OF BOTH SMALL AND LARGE INTESTINE WITHOUT COMPLICATION (HCC): ICD-10-CM

## 2023-04-17 DIAGNOSIS — M85.89 OSTEOPENIA OF MULTIPLE SITES: ICD-10-CM

## 2023-04-17 DIAGNOSIS — I10 ESSENTIAL HYPERTENSION: Primary | ICD-10-CM

## 2023-04-17 DIAGNOSIS — E78.2 MIXED HYPERLIPIDEMIA: ICD-10-CM

## 2023-04-17 PROCEDURE — 3077F SYST BP >= 140 MM HG: CPT | Performed by: FAMILY MEDICINE

## 2023-04-17 PROCEDURE — G8510 SCR DEP NEG, NO PLAN REQD: HCPCS | Performed by: FAMILY MEDICINE

## 2023-04-17 PROCEDURE — G8427 DOCREV CUR MEDS BY ELIG CLIN: HCPCS | Performed by: FAMILY MEDICINE

## 2023-04-17 PROCEDURE — 1101F PT FALLS ASSESS-DOCD LE1/YR: CPT | Performed by: FAMILY MEDICINE

## 2023-04-17 PROCEDURE — G8417 CALC BMI ABV UP PARAM F/U: HCPCS | Performed by: FAMILY MEDICINE

## 2023-04-17 PROCEDURE — G8536 NO DOC ELDER MAL SCRN: HCPCS | Performed by: FAMILY MEDICINE

## 2023-04-17 PROCEDURE — 1090F PRES/ABSN URINE INCON ASSESS: CPT | Performed by: FAMILY MEDICINE

## 2023-04-17 PROCEDURE — 1123F ACP DISCUSS/DSCN MKR DOCD: CPT | Performed by: FAMILY MEDICINE

## 2023-04-17 PROCEDURE — 3078F DIAST BP <80 MM HG: CPT | Performed by: FAMILY MEDICINE

## 2023-04-17 PROCEDURE — 99214 OFFICE O/P EST MOD 30 MIN: CPT | Performed by: FAMILY MEDICINE

## 2023-04-17 RX ORDER — METOPROLOL SUCCINATE 50 MG/1
25 TABLET, EXTENDED RELEASE ORAL DAILY
Qty: 90 TABLET | Refills: 3
Start: 2023-04-17

## 2023-04-17 NOTE — PROGRESS NOTES
Julissa Urbina (: 1945) is a 69 y.o. female, established patient, here for evaluation of the following chief complaint(s):  Hypertension (Follow-up)         ASSESSMENT/PLAN:  Diagnoses and all orders for this visit:    1. Essential hypertension - near goal, home readings mostly controlled. Had low BPs on higher dose of metoprolol so sticking with Metoprolol 25 mg daily along with Amlodipine 10 mg and Valsartan/HCTZ 160/12.5 mg. Reviewed possible SEs of b bl including orthostatic hypotension. 2. Crohn's disease of both small and large intestine without complication ([facility] [address] - stable on mesalamine, ct following with GI    3. Stage 3a chronic kidney disease ([facility] [address] - newer issue, goal /80, no hx of DM, no obesity, avoids NSAIDs, ct on ARB, drinking plenty of water    4. Osteopenia of multiple sites - stable on Ca/Vit D. Not interested in Fosamax     5. HLD - declines statin    Return in about 3 months (around 2023), or if symptoms worsen or fail to improve. Subjective:   70 yo AAF with PMH sig for Crohns Dz, HTN, HLD, asymptomatic bradycardia, osteopenia, and asthma here to f/u on HTN. Since last appt, has done well with med changes for HTN. She is up to date on her routine care. Last mammo 2022  DEXA 2022 at [facility] - osteopenia. Ct Ca/Vit D and weight bearing exercise. Hyperlipidemia & HTN  Patient is currently taking Metoprolol 25 mg (switched from Bystolic 10 mg daily d/t cost), amlodipine 10 mg daily, Valsartan/HCTZ 160/12.5 mg.  Prev was on   Home BP controlled with mostly 120s/70s. Did have a low BP yesterday with 94/61 and felt lightheadedness. She stopped taking hydralazine (was on 50 mg 3 times daily but had hypotensive symptoms so decreased to 25 mg twice daily prev)  Unable to [place] spironolactone d/t hives years ago. Has been seeing Cardiology for this. Had nuclear stress test in 3/2022 that was low risk. Had echo as well recently with no sig concerns.   Echo with nml EF and mild MVR. No TIA's, no chest pain on exertion, no swelling of ankles. Exercising - Minimal, prev was more active d/t left knee pain  Dieting - Yes, arminda with Crohns  Smoking - No     Lab Results   Component Value Date/Time    Cholesterol, total 248 (H) 05/23/2022 08:22 AM    HDL Cholesterol 91 05/23/2022 08:22 AM    LDL, calculated 147 (H) 05/23/2022 08:22 AM    LDL, calculated 161.4 (H) 11/17/2021 11:19 AM    Triglyceride 64 05/23/2022 08:22 AM     ROS  Gen - no fever/chills  Eyes - hx of cataracts, ct on restasis  Resp - no dyspnea or cough  CV - no chest pain or ZACARIAS  GI - Crohns stable, doing well on mesalamine. Rare use of prednisone.   Rest per HPI    Past Medical History:   Diagnosis Date    Allergic rhinitis 06/28/2017    Asthma 06/28/2017    Impressio: continue nebs and Dulera, prednisone taper    Bradycardia 06/28/2017    Impression: asymptomatic, continue Bystolic    Cataract, bilateral 06/28/2017    Impression: low risk for elective surgical procedure, proceed without further risk stratification, EKG shows sinus domingo without acute ST-T changes which is her baseline    Crohn's disease ([facility] [address] 06/28/2017    Impression: GI following    Degenerative arthritis 06/28/2017    Hair loss 06/28/2017    Impression: refer to derm    Herpes Dx 2015    no outbreak as of 5/2/16    Hyperlipidemia 06/28/2017    Hypertension     Hypokalemia 06/28/2017    LTBI (latent tuberculosis infection)     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, 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 - had neg PPD after this    MRSA (methicillin resistant staph aureus) culture positive \"many years ago\" as of 5/2/16    pt states + nasal swab \"many years ago\", Tx and no + since; UNCONFIRMED    Osteoporosis 06/28/2017    Pneumonia 06/28/2017    Impression:  Completed course of antibiotics, clinically resolved Status is Inactive    Syncope     Moderna vaccine     Past Surgical History:   Procedure Laterality Date    HX CATARACT REMOVAL Right 3/28/16 HX MOHS PROCEDURES Right     HX ORTHOPAEDIC Left     left knee sx    HX ORTHOPAEDIC Right     trigger release      HX TUBAL LIGATION          Objective:     Blood pressure (!) 147/67, pulse (!) 55, temperature 97.7 °F (36.5 °C), temperature source Temporal, resp. rate 20, height 5' 1\" (1.549 m), weight 65.7 kg (144 lb 12.8 oz), SpO2 97 %. Physical Exam  General appearance - alert, well appearing, and in no distress  Eyes -sclera anicteric  Neck - supple, no significant adenopathy, no thyromegaly  Chest - clear to auscultation, no wheezes, rales or rhonchi, symmetric air entry  Heart - normal rate, regular rhythm, normal S1, S2, no murmurs, rubs, clicks or gallops  Neurological - alert, oriented, normal speech, no focal findings or movement disorder noted  Extr - trace edema  Psych - normal mood and affect    An electronic signature was used to authenticate this note.   -- Lauro Lynn MD

## 2023-04-17 NOTE — PROGRESS NOTES
Chief Complaint   Patient presents with    Hypertension     Follow-up     1. Have you been to the ER, urgent care clinic since your last visit? Hospitalized since your last visit? No    2. Have you seen or consulted any other health care providers outside of the [address] [place] [address] since your last visit? Include any pap smears or colon screening.  No

## 2023-05-02 LAB
BUN SERPL-MCNC: 21 MG/DL (ref 8–27)
BUN/CREAT SERPL: 17 (ref 12–28)
CALCIUM SERPL-MCNC: 9.3 MG/DL (ref 8.7–10.3)
CHLORIDE SERPL-SCNC: 106 MMOL/L (ref 96–106)
CO2 SERPL-SCNC: 26 MMOL/L (ref 20–29)
CREAT SERPL-MCNC: 1.27 MG/DL (ref 0.57–1)
EGFRCR SERPLBLD CKD-EPI 2021: 44 ML/MIN/1.73
GLUCOSE SERPL-MCNC: 84 MG/DL (ref 70–99)
POTASSIUM SERPL-SCNC: 4.1 MMOL/L (ref 3.5–5.2)
REPORT: NORMAL
SODIUM SERPL-SCNC: 145 MMOL/L (ref 134–144)

## 2023-05-08 ENCOUNTER — OFFICE VISIT (OUTPATIENT)
Age: 78
End: 2023-05-08
Payer: COMMERCIAL

## 2023-05-08 VITALS
WEIGHT: 144.6 LBS | SYSTOLIC BLOOD PRESSURE: 182 MMHG | HEIGHT: 61 IN | DIASTOLIC BLOOD PRESSURE: 75 MMHG | OXYGEN SATURATION: 100 % | HEART RATE: 49 BPM | TEMPERATURE: 97.7 F | BODY MASS INDEX: 27.3 KG/M2 | RESPIRATION RATE: 20 BRPM

## 2023-05-08 DIAGNOSIS — E78.00 PURE HYPERCHOLESTEROLEMIA: ICD-10-CM

## 2023-05-08 DIAGNOSIS — K50.80 CROHN'S DISEASE OF BOTH SMALL AND LARGE INTESTINE WITHOUT COMPLICATIONS (HCC): ICD-10-CM

## 2023-05-08 DIAGNOSIS — I10 ESSENTIAL (PRIMARY) HYPERTENSION: ICD-10-CM

## 2023-05-08 DIAGNOSIS — Z01.818 PREOP EXAMINATION: Primary | ICD-10-CM

## 2023-05-08 DIAGNOSIS — N18.31 CHRONIC KIDNEY DISEASE, STAGE 3A (HCC): ICD-10-CM

## 2023-05-08 PROCEDURE — 3078F DIAST BP <80 MM HG: CPT | Performed by: FAMILY MEDICINE

## 2023-05-08 PROCEDURE — 1123F ACP DISCUSS/DSCN MKR DOCD: CPT | Performed by: FAMILY MEDICINE

## 2023-05-08 PROCEDURE — 99214 OFFICE O/P EST MOD 30 MIN: CPT | Performed by: FAMILY MEDICINE

## 2023-05-08 PROCEDURE — 3074F SYST BP LT 130 MM HG: CPT | Performed by: FAMILY MEDICINE

## 2023-05-08 RX ORDER — ESTRADIOL 10 UG/1
25 INSERT VAGINAL
COMMUNITY

## 2023-05-08 RX ORDER — ALUMINUM ZIRCONIUM OCTACHLOROHYDREX GLY 16 G/100G
1 GEL TOPICAL DAILY
COMMUNITY

## 2023-05-08 RX ORDER — LORATADINE 10 MG/1
CAPSULE, LIQUID FILLED ORAL
COMMUNITY

## 2023-05-08 RX ORDER — MECLIZINE HYDROCHLORIDE 25 MG/1
TABLET ORAL
COMMUNITY
Start: 2022-01-13

## 2023-05-08 RX ORDER — CYCLOBENZAPRINE HCL 10 MG
TABLET ORAL
COMMUNITY

## 2023-05-08 RX ORDER — HYDRALAZINE HYDROCHLORIDE 50 MG/1
50 TABLET, FILM COATED ORAL 3 TIMES DAILY
COMMUNITY
Start: 2022-02-02 | End: 2023-05-08 | Stop reason: ALTCHOICE

## 2023-05-08 RX ORDER — OMEPRAZOLE 40 MG/1
CAPSULE, DELAYED RELEASE ORAL
COMMUNITY
Start: 2023-03-29

## 2023-05-08 RX ORDER — PREDNISOLONE ACETATE 10 MG/ML
SUSPENSION/ DROPS OPHTHALMIC
COMMUNITY
Start: 2021-10-27

## 2023-05-08 RX ORDER — VALSARTAN 320 MG/1
TABLET ORAL
COMMUNITY
Start: 2021-09-30 | End: 2023-05-08 | Stop reason: SDUPTHER

## 2023-05-08 RX ORDER — SYRINGE WITH NEEDLE, 1 ML 28GX1/2"
SYRINGE, EMPTY DISPOSABLE MISCELLANEOUS
COMMUNITY
Start: 2017-04-19

## 2023-05-08 RX ORDER — CALCIUM CARBONATE/VITAMIN D3 600 MG-10
TABLET ORAL
COMMUNITY

## 2023-05-08 RX ORDER — OLOPATADINE HYDROCHLORIDE 665 UG/1
SPRAY NASAL
COMMUNITY

## 2023-05-08 RX ORDER — MESALAMINE 800 MG/1
800 TABLET, DELAYED RELEASE ORAL 3 TIMES DAILY
COMMUNITY

## 2023-05-08 RX ORDER — ESTRADIOL 10 UG/1
INSERT VAGINAL
COMMUNITY
Start: 2017-06-28 | End: 2023-05-08

## 2023-05-08 RX ORDER — BIOTIN 1 MG
3000 TABLET ORAL DAILY
COMMUNITY

## 2023-05-08 RX ORDER — LORATADINE 10 MG/1
1 CAPSULE, LIQUID FILLED ORAL DAILY
COMMUNITY
End: 2023-05-08

## 2023-05-08 RX ORDER — NEBULIZER ACCESSORIES
EACH MISCELLANEOUS
COMMUNITY
Start: 2017-07-13

## 2023-05-08 RX ORDER — NEBIVOLOL 10 MG/1
TABLET ORAL
COMMUNITY
End: 2023-05-08

## 2023-05-08 SDOH — ECONOMIC STABILITY: FOOD INSECURITY: WITHIN THE PAST 12 MONTHS, THE FOOD YOU BOUGHT JUST DIDN'T LAST AND YOU DIDN'T HAVE MONEY TO GET MORE.: NEVER TRUE

## 2023-05-08 SDOH — ECONOMIC STABILITY: INCOME INSECURITY: HOW HARD IS IT FOR YOU TO PAY FOR THE VERY BASICS LIKE FOOD, HOUSING, MEDICAL CARE, AND HEATING?: NOT HARD AT ALL

## 2023-05-08 SDOH — ECONOMIC STABILITY: FOOD INSECURITY: WITHIN THE PAST 12 MONTHS, YOU WORRIED THAT YOUR FOOD WOULD RUN OUT BEFORE YOU GOT MONEY TO BUY MORE.: NEVER TRUE

## 2023-05-08 SDOH — ECONOMIC STABILITY: HOUSING INSECURITY
IN THE LAST 12 MONTHS, WAS THERE A TIME WHEN YOU DID NOT HAVE A STEADY PLACE TO SLEEP OR SLEPT IN A SHELTER (INCLUDING NOW)?: NO

## 2023-05-08 NOTE — PROGRESS NOTES
Chief Complaint   Patient presents with    [address]     1. Have you been to the ER, urgent care clinic since your last visit? Hospitalized since your last visit? No    2. Have you seen or consulted any other health care providers outside of the [address] [place] [address] since your last visit? Include any pap smears or colon screening.  Yes Where: [facility]
doors)? N  2. Tired - Do you often feel tired, fatigued, or sleepy during daytime? N  3. Observed - Has anyone observed you stop breathing during your sleep? N  4. Blood pressure - Do you have or are you being treated for high blood pressure? Y  5. BMI - BMI more than 35 kg/m2? N  10. Age - Age over 48 yr old? Y  7. Neck circumference - Neck circumference greater than 40 cm? N  8. Gender - Gender male? N      Hyperlipidemia & HTN  Patient is currently taking Metoprolol 25 mg (switched from Bystolic 10 mg daily d/t cost), amlodipine 10 mg daily, Valsartan/HCTZ 320/25 mg. Home BP controlled with mostly 120-130s/70s. She stopped taking hydralazine (was on 50 mg 3 times daily but had hypotensive symptoms so decreased to 25 mg twice daily prev)  Unable to [place] spironolactone d/t hives years ago. Has been seeing Cardiology for this. Had nuclear stress test in 3/2022 that was low risk. Had echo as well recently with no sig concerns. Echo with nml EF and mild MVR. No TIA's, no chest pain on exertion, no swelling of ankles. Exercising - Minimal, prev was more active d/t left knee pain  Dieting - Yes, haritha with Crohns  Smoking - No     Lab Results   Component Value Date/Time    CHOL 248 05/23/2022 08:22 AM    HDL 91 05/23/2022 08:22 AM     ROS  Gen - no fever/chills  Eyes - hx of cataracts, ct on restasis  Resp - no dyspnea or cough  CV - no chest pain or JONES  GI - Crohns stable, doing well on mesalamine. Rare use of prednisone. Rest per HPI    Objective:   Blood pressure (!) 182/75, pulse (!) 49, temperature 97.7 °F (36.5 °C), temperature source Temporal, resp. rate 20, height 5' 1\" (1.549 m), weight 144 lb 9.6 oz (65.6 kg), SpO2 100 %.     Physical Exam  General appearance - alert, well appearing, and in no distress  Eyes -sclera anicteric  Neck - supple, no significant adenopathy, no thyromegaly  Chest - clear to auscultation, no wheezes, rales or rhonchi, symmetric air entry  Heart - normal rate, regular rhythm,

## 2023-05-15 ENCOUNTER — OFFICE VISIT (OUTPATIENT)
Age: 78
End: 2023-05-15
Payer: COMMERCIAL

## 2023-05-15 VITALS
OXYGEN SATURATION: 99 % | SYSTOLIC BLOOD PRESSURE: 164 MMHG | HEIGHT: 61 IN | RESPIRATION RATE: 16 BRPM | DIASTOLIC BLOOD PRESSURE: 69 MMHG | TEMPERATURE: 97.3 F | BODY MASS INDEX: 27.38 KG/M2 | WEIGHT: 145 LBS | HEART RATE: 61 BPM

## 2023-05-15 DIAGNOSIS — Z01.818 PREOP EXAMINATION: Primary | ICD-10-CM

## 2023-05-15 DIAGNOSIS — I10 ESSENTIAL (PRIMARY) HYPERTENSION: ICD-10-CM

## 2023-05-15 PROCEDURE — 3078F DIAST BP <80 MM HG: CPT | Performed by: FAMILY MEDICINE

## 2023-05-15 PROCEDURE — 99213 OFFICE O/P EST LOW 20 MIN: CPT | Performed by: FAMILY MEDICINE

## 2023-05-15 PROCEDURE — 1123F ACP DISCUSS/DSCN MKR DOCD: CPT | Performed by: FAMILY MEDICINE

## 2023-05-15 PROCEDURE — 3077F SYST BP >= 140 MM HG: CPT | Performed by: FAMILY MEDICINE

## 2023-05-15 RX ORDER — METOPROLOL SUCCINATE 25 MG/1
25 TABLET, EXTENDED RELEASE ORAL DAILY
Qty: 90 TABLET | Refills: 1 | Status: SHIPPED | OUTPATIENT
Start: 2023-05-15

## 2023-05-15 ASSESSMENT — PATIENT HEALTH QUESTIONNAIRE - PHQ9
1. LITTLE INTEREST OR PLEASURE IN DOING THINGS: 0
SUM OF ALL RESPONSES TO PHQ QUESTIONS 1-9: 0
2. FEELING DOWN, DEPRESSED OR HOPELESS: 0
SUM OF ALL RESPONSES TO PHQ QUESTIONS 1-9: 0
SUM OF ALL RESPONSES TO PHQ9 QUESTIONS 1 & 2: 0

## 2023-05-15 NOTE — PROGRESS NOTES
Sky Savage (: 1945) is a 69 y.o. female, established patient, here for evaluation of the following chief complaint(s):      ASSESSMENT/PLAN:  Aixa Golden was seen today for pre-op exam.    Diagnoses and all orders for this visit:    Preop examination -blood pressure running high today but home bp readings are controlled - brings log today. EKG is normal.  Cleared for surgery    Essential (primary) hypertension - bp running high but good home readings. Prev cardiac testing with no concerns. Had low BPs on higher dose of metoprolol and is slightly bradycardic so sticking with Metoprolol 25 mg daily along with Amlodipine 10 mg and valsartan/HCTZ 320/25 mg. Reviewed possible SEs of b bl including orthostatic hypotension. Return in about 3 months (around 8/15/2023), or if symptoms worsen or fail to improve. Subjective:   68 yo AAF with PMH sig for Crohns Dz, HTN, HLD, asymptomatic bradycardia, osteopenia, and asthma here for preop clearance    Preop - will have left partial knee arthroloplasty with Dr. Krissy John on 23. Planned to clear pt but wanted to review BP again given her high reading in office at last appt. Hyperlipidemia & HTN  Patient is currently taking Metoprolol 25 mg (switched from Bystolic 10 mg daily d/t cost), amlodipine 10 mg daily, Valsartan/HCTZ 320/25 mg. Home BP controlled with mostly 120-130s/70s. Logs show a few readings in 100s/60s as well but pt denies any hypotension sx. She stopped taking hydralazine (was on 50 mg 3 times daily but had hypotensive symptoms so decreased to 25 mg twice daily prev)  Unable to paola spironolactone d/t hives years ago. Has been seeing Cardiology for this. Had nuclear stress test in 3/2022 that was low risk. Had echo as well recently with no sig concerns. Echo with nml EF and mild MVR. No TIA's, no chest pain on exertion, no swelling of ankles.   Exercising - Minimal, prev was more active d/t left knee pain  Dieting - Yes, haritha with

## 2023-05-15 NOTE — PROGRESS NOTES
Chief Complaint   Patient presents with    Hypertension     Follow-up    1. Have you been to the ER, urgent care clinic since your last visit? Hospitalized since your last visit? No    2. Have you seen or consulted any other health care providers outside of the [address] [place] [address] since your last visit? Include any pap smears or colon screening.  No

## 2023-08-14 ENCOUNTER — HOSPITAL ENCOUNTER (OUTPATIENT)
Facility: HOSPITAL | Age: 78
Discharge: HOME | End: 2023-08-17
Attending: FAMILY MEDICINE
Payer: COMMERCIAL

## 2023-08-14 DIAGNOSIS — Z12.31 VISIT FOR SCREENING MAMMOGRAM: ICD-10-CM

## 2023-08-14 PROCEDURE — 77063 BREAST TOMOSYNTHESIS BI: CPT

## 2023-09-06 ENCOUNTER — TELEPHONE (OUTPATIENT)
Age: 78
End: 2023-09-06

## 2023-09-06 NOTE — TELEPHONE ENCOUNTER
Pls call patient about low BP readings over the last few days (states she has had problems with it before)     This morning 94/54 and later it was 13/63    Pls advise as Dr Zena Sethi is out of the office until Sept. 11, 2023    Best number to reach her is [phone]  or [phone]

## 2023-09-07 NOTE — TELEPHONE ENCOUNTER
Spoke with patient and she feels better today. Advised if symptoms return to go to Urgent Care for evaluation. She will transfer to another provider for continued care.

## 2023-10-17 ENCOUNTER — OFFICE VISIT (OUTPATIENT)
Facility: CLINIC | Age: 78
End: 2023-10-17
Payer: COMMERCIAL

## 2023-10-17 VITALS
SYSTOLIC BLOOD PRESSURE: 166 MMHG | HEIGHT: 61 IN | HEART RATE: 66 BPM | DIASTOLIC BLOOD PRESSURE: 82 MMHG | WEIGHT: 136.8 LBS | OXYGEN SATURATION: 100 % | RESPIRATION RATE: 20 BRPM | BODY MASS INDEX: 25.83 KG/M2 | TEMPERATURE: 98 F

## 2023-10-17 DIAGNOSIS — M85.89 OSTEOPENIA OF MULTIPLE SITES: ICD-10-CM

## 2023-10-17 DIAGNOSIS — I10 ESSENTIAL HYPERTENSION: Primary | ICD-10-CM

## 2023-10-17 DIAGNOSIS — K50.90 CROHN'S DISEASE WITHOUT COMPLICATION, UNSPECIFIED GASTROINTESTINAL TRACT LOCATION (HCC): ICD-10-CM

## 2023-10-17 DIAGNOSIS — E78.2 MIXED HYPERLIPIDEMIA: ICD-10-CM

## 2023-10-17 DIAGNOSIS — Z23 NEEDS FLU SHOT: ICD-10-CM

## 2023-10-17 DIAGNOSIS — J45.40 MODERATE PERSISTENT ASTHMA WITHOUT COMPLICATION: ICD-10-CM

## 2023-10-17 DIAGNOSIS — E55.9 VITAMIN D DEFICIENCY: ICD-10-CM

## 2023-10-17 LAB
ALBUMIN SERPL-MCNC: 4.2 G/DL (ref 3.5–5)
ALBUMIN/GLOB SERPL: 1.2 (ref 1.1–2.2)
ALP SERPL-CCNC: 82 U/L (ref 45–117)
ALT SERPL-CCNC: 19 U/L (ref 12–78)
ANION GAP SERPL CALC-SCNC: 6 MMOL/L (ref 5–15)
APPEARANCE UR: CLEAR
AST SERPL-CCNC: 16 U/L (ref 15–37)
BACTERIA URNS QL MICRO: NEGATIVE /HPF
BASOPHILS # BLD: 0 K/UL (ref 0–0.1)
BASOPHILS NFR BLD: 1 % (ref 0–1)
BILIRUB SERPL-MCNC: 0.3 MG/DL (ref 0.2–1)
BILIRUB UR QL: NEGATIVE
BUN SERPL-MCNC: 17 MG/DL (ref 6–20)
BUN/CREAT SERPL: 13 (ref 12–20)
CALCIUM SERPL-MCNC: 9.6 MG/DL (ref 8.5–10.1)
CHLORIDE SERPL-SCNC: 106 MMOL/L (ref 97–108)
CHOLEST SERPL-MCNC: 276 MG/DL
CO2 SERPL-SCNC: 30 MMOL/L (ref 21–32)
COLOR UR: ABNORMAL
CREAT SERPL-MCNC: 1.27 MG/DL (ref 0.55–1.02)
DIFFERENTIAL METHOD BLD: ABNORMAL
EOSINOPHIL # BLD: 0.1 K/UL (ref 0–0.4)
EOSINOPHIL NFR BLD: 1 % (ref 0–7)
EPITH CASTS URNS QL MICRO: ABNORMAL /LPF
ERYTHROCYTE [DISTWIDTH] IN BLOOD BY AUTOMATED COUNT: 16 % (ref 11.5–14.5)
GLOBULIN SER CALC-MCNC: 3.6 G/DL (ref 2–4)
GLUCOSE SERPL-MCNC: 98 MG/DL (ref 65–100)
GLUCOSE UR STRIP.AUTO-MCNC: NEGATIVE MG/DL
HCT VFR BLD AUTO: 38.9 % (ref 35–47)
HDLC SERPL-MCNC: 104 MG/DL
HDLC SERPL: 2.7 (ref 0–5)
HGB BLD-MCNC: 11.7 G/DL (ref 11.5–16)
HGB UR QL STRIP: NEGATIVE
HYALINE CASTS URNS QL MICRO: ABNORMAL /LPF (ref 0–5)
IMM GRANULOCYTES # BLD AUTO: 0 K/UL (ref 0–0.04)
IMM GRANULOCYTES NFR BLD AUTO: 0 % (ref 0–0.5)
KETONES UR QL STRIP.AUTO: NEGATIVE MG/DL
LDLC SERPL CALC-MCNC: 153 MG/DL (ref 0–100)
LEUKOCYTE ESTERASE UR QL STRIP.AUTO: ABNORMAL
LYMPHOCYTES # BLD: 1.9 K/UL (ref 0.8–3.5)
LYMPHOCYTES NFR BLD: 28 % (ref 12–49)
MCH RBC QN AUTO: 22.5 PG (ref 26–34)
MCHC RBC AUTO-ENTMCNC: 30.1 G/DL (ref 30–36.5)
MCV RBC AUTO: 74.8 FL (ref 80–99)
MONOCYTES # BLD: 0.5 K/UL (ref 0–1)
MONOCYTES NFR BLD: 8 % (ref 5–13)
NEUTS SEG # BLD: 4.1 K/UL (ref 1.8–8)
NEUTS SEG NFR BLD: 62 % (ref 32–75)
NITRITE UR QL STRIP.AUTO: NEGATIVE
NRBC # BLD: 0 K/UL (ref 0–0.01)
NRBC BLD-RTO: 0 PER 100 WBC
PH UR STRIP: 5.5 (ref 5–8)
PLATELET # BLD AUTO: 281 K/UL (ref 150–400)
POTASSIUM SERPL-SCNC: 3.9 MMOL/L (ref 3.5–5.1)
PROT SERPL-MCNC: 7.8 G/DL (ref 6.4–8.2)
PROT UR STRIP-MCNC: NEGATIVE MG/DL
RBC # BLD AUTO: 5.2 M/UL (ref 3.8–5.2)
RBC #/AREA URNS HPF: ABNORMAL /HPF (ref 0–5)
SODIUM SERPL-SCNC: 142 MMOL/L (ref 136–145)
SP GR UR REFRACTOMETRY: <1.005 (ref 1–1.03)
TRIGL SERPL-MCNC: 95 MG/DL
UROBILINOGEN UR QL STRIP.AUTO: 0.2 EU/DL (ref 0.2–1)
VLDLC SERPL CALC-MCNC: 19 MG/DL
WBC # BLD AUTO: 6.6 K/UL (ref 3.6–11)
WBC URNS QL MICRO: ABNORMAL /HPF (ref 0–4)

## 2023-10-17 PROCEDURE — 3079F DIAST BP 80-89 MM HG: CPT | Performed by: INTERNAL MEDICINE

## 2023-10-17 PROCEDURE — 90471 IMMUNIZATION ADMIN: CPT | Performed by: INTERNAL MEDICINE

## 2023-10-17 PROCEDURE — 99214 OFFICE O/P EST MOD 30 MIN: CPT | Performed by: INTERNAL MEDICINE

## 2023-10-17 PROCEDURE — 1123F ACP DISCUSS/DSCN MKR DOCD: CPT | Performed by: INTERNAL MEDICINE

## 2023-10-17 PROCEDURE — 90694 VACC AIIV4 NO PRSRV 0.5ML IM: CPT | Performed by: INTERNAL MEDICINE

## 2023-10-17 PROCEDURE — 3077F SYST BP >= 140 MM HG: CPT | Performed by: INTERNAL MEDICINE

## 2023-10-17 NOTE — PROGRESS NOTES
Jose Miguel Quevedo is a 69 y.o. female presenting for Annual Exam  .     1. Have you been to the ER, urgent care clinic since your last visit? Hospitalized since your last visit? No    2. Have you seen or consulted any other health care providers outside of the [address] [place] [address] since your last visit? Include any pap smears or colon screening. No    After obtaining written consent and per orders of Dr. Lino Velázquez, injection of Fluad given by Jeniffer Kruse MA. Patient tolerated procedure well. VIS was given to them. No reactions noted.

## 2023-10-17 NOTE — PROGRESS NOTES
Ankita Kulkarni is a 69 y.o. female and presents with Annual Exam  .    Subjective:    Mrs. Brien Aguilar presents today for follow-up. Her history is significant for hypertension, asthma, osteopenia, mixed hyperlipidemia, and Crohn's disease. She had a slight increase in her creatinine noted on the past couple of lab test.  She had a microalbumin that was normal.  She does not have a history of diabetes. She was only on an NSAID for a very short period of time when she had a left partial knee replacement last year. She has no shortness of breath, chest pain, palpitations, PND, orthopnea, or pedal edema.   Past Medical History:   Diagnosis Date    Allergic rhinitis 06/28/2017    Asthma 06/28/2017    Impressio: continue nebs and Dulera, prednisone taper    Bradycardia 06/28/2017    Impression: asymptomatic, continue Bystolic    Cataract, bilateral 06/28/2017    Impression: low risk for elective surgical procedure, proceed without further risk stratification, EKG shows sinus bebeto without acute ST-T changes which is her baseline    Crohn's disease ([address]) 06/28/2017    Impression: GI following    Degenerative arthritis 06/28/2017    Hair loss 06/28/2017    Impression: refer to derm    Herpes Dx 2015    no outbreak as of 5/2/16    Hyperlipidemia 06/28/2017    Hypertension     Hypokalemia 06/28/2017    LTBI (latent tuberculosis infection)     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, 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 - had neg PPD after this    MRSA (methicillin resistant staph aureus) culture positive \"many years ago\" as of 5/2/16    pt states + nasal swab \"many years ago\", Tx and no + since; UNCONFIRMED    Pneumonia 06/28/2017    Impression:  Completed course of antibiotics, clinically resolved Status is Inactive    Syncope     Moderna vaccine     Past Surgical History:   Procedure Laterality Date    CATARACT REMOVAL Right 3/28/16    MOHS SURGERY Right     ORTHOPEDIC SURGERY Right     trigger release      ORTHOPEDIC SURGERY

## 2023-10-18 LAB — 25(OH)D3 SERPL-MCNC: 28 NG/ML (ref 30–100)

## 2023-10-18 RX ORDER — ERGOCALCIFEROL 1.25 MG/1
50000 CAPSULE ORAL WEEKLY
Qty: 12 CAPSULE | Refills: 0 | Status: SHIPPED | OUTPATIENT
Start: 2023-10-18

## 2023-11-29 RX ORDER — AMLODIPINE BESYLATE 10 MG/1
10 TABLET ORAL DAILY
Qty: 90 TABLET | Refills: 3 | Status: SHIPPED | OUTPATIENT
Start: 2023-11-29

## 2023-11-29 NOTE — TELEPHONE ENCOUNTER
Last appointment: 10/17/23  Next appointment: 1/18/24, 4/18/24    Requested Prescriptions     Pending Prescriptions Disp Refills    amLODIPine (NORVASC) 10 MG tablet 90 tablet 3     Sig: Take 1 tablet by mouth daily         For Pharmacy Admin Tracking Only    Program: Medication Refill  CPA in place:    Recommendation Provided To:    Intervention Detail: New Rx: 1, reason: Patient Preference  Intervention Accepted By:   Chantale Tena Closed?:    Time Spent (min): 5

## 2023-12-05 RX ORDER — VALSARTAN AND HYDROCHLOROTHIAZIDE 160; 12.5 MG/1; MG/1
1 TABLET, FILM COATED ORAL DAILY
Qty: 90 TABLET | Refills: 1 | Status: SHIPPED | OUTPATIENT
Start: 2023-12-05

## 2023-12-05 NOTE — TELEPHONE ENCOUNTER
valsartan-hydroCHLOROthiazide (DIOVAN-HCT) 160-12.5 mg per tablet 90 Tablet 3 1/18/2023    Sig: TAKE 1 TABLET DAILY    Last filled: 10/17/23  Future appt: 4/18/24    Pharmacy [facility]    Last filled by Dr. Deshawn Harry

## 2024-01-18 ENCOUNTER — NURSE ONLY (OUTPATIENT)
Facility: CLINIC | Age: 79
End: 2024-01-18

## 2024-01-18 DIAGNOSIS — E55.9 VITAMIN D DEFICIENCY: ICD-10-CM

## 2024-01-18 NOTE — PROGRESS NOTES
Lab results showed normal glucose of 90. Electrolytes are normal and kidney and liver function are normal.  Urinalysis was clear. Complete blood count was essentially normal.  Red blood cell size was below normal but this is been noted previously and is not significant. Total cholesterol was elevated at 239 with an HDL cholesterol of 89. LDL cholesterol was just above normal at 137. Continue current medical regimen and follow-up as planned. Scheduled patient an appointment.

## 2024-01-19 LAB — 25(OH)D3 SERPL-MCNC: 66.3 NG/ML (ref 30–100)

## 2024-04-18 ENCOUNTER — OFFICE VISIT (OUTPATIENT)
Facility: CLINIC | Age: 79
End: 2024-04-18
Payer: COMMERCIAL

## 2024-04-18 VITALS
OXYGEN SATURATION: 97 % | HEIGHT: 61 IN | WEIGHT: 131.4 LBS | RESPIRATION RATE: 16 BRPM | BODY MASS INDEX: 24.81 KG/M2 | HEART RATE: 67 BPM | TEMPERATURE: 97.9 F | SYSTOLIC BLOOD PRESSURE: 140 MMHG | DIASTOLIC BLOOD PRESSURE: 80 MMHG

## 2024-04-18 DIAGNOSIS — K50.90 CROHN'S DISEASE WITHOUT COMPLICATION, UNSPECIFIED GASTROINTESTINAL TRACT LOCATION (HCC): ICD-10-CM

## 2024-04-18 DIAGNOSIS — M85.89 OSTEOPENIA OF MULTIPLE SITES: ICD-10-CM

## 2024-04-18 DIAGNOSIS — Z00.00 MEDICARE ANNUAL WELLNESS VISIT, SUBSEQUENT: ICD-10-CM

## 2024-04-18 DIAGNOSIS — N18.32 STAGE 3B CHRONIC KIDNEY DISEASE (HCC): ICD-10-CM

## 2024-04-18 DIAGNOSIS — E78.2 MIXED HYPERLIPIDEMIA: ICD-10-CM

## 2024-04-18 DIAGNOSIS — I10 ESSENTIAL HYPERTENSION: Primary | ICD-10-CM

## 2024-04-18 DIAGNOSIS — J45.40 MODERATE PERSISTENT ASTHMA WITHOUT COMPLICATION: ICD-10-CM

## 2024-04-18 PROBLEM — K51.20 ULCERATIVE PROCTITIS WITHOUT COMPLICATION (HCC): Status: RESOLVED | Noted: 2017-07-13 | Resolved: 2024-04-18

## 2024-04-18 PROCEDURE — 1123F ACP DISCUSS/DSCN MKR DOCD: CPT | Performed by: INTERNAL MEDICINE

## 2024-04-18 PROCEDURE — 3078F DIAST BP <80 MM HG: CPT | Performed by: INTERNAL MEDICINE

## 2024-04-18 PROCEDURE — 3077F SYST BP >= 140 MM HG: CPT | Performed by: INTERNAL MEDICINE

## 2024-04-18 PROCEDURE — G0439 PPPS, SUBSEQ VISIT: HCPCS | Performed by: INTERNAL MEDICINE

## 2024-04-18 RX ORDER — FAMOTIDINE 40 MG/1
40 TABLET, FILM COATED ORAL 2 TIMES DAILY
COMMUNITY
Start: 2024-04-01

## 2024-04-18 ASSESSMENT — PATIENT HEALTH QUESTIONNAIRE - PHQ9
SUM OF ALL RESPONSES TO PHQ QUESTIONS 1-9: 0
SUM OF ALL RESPONSES TO PHQ9 QUESTIONS 1 & 2: 0
SUM OF ALL RESPONSES TO PHQ QUESTIONS 1-9: 0
2. FEELING DOWN, DEPRESSED OR HOPELESS: NOT AT ALL
SUM OF ALL RESPONSES TO PHQ QUESTIONS 1-9: 0
SUM OF ALL RESPONSES TO PHQ QUESTIONS 1-9: 0
1. LITTLE INTEREST OR PLEASURE IN DOING THINGS: NOT AT ALL

## 2024-04-18 ASSESSMENT — LIFESTYLE VARIABLES
HOW MANY STANDARD DRINKS CONTAINING ALCOHOL DO YOU HAVE ON A TYPICAL DAY: PATIENT DOES NOT DRINK
HOW OFTEN DO YOU HAVE A DRINK CONTAINING ALCOHOL: NEVER

## 2024-04-18 NOTE — PROGRESS NOTES
Chief Complaint   Patient presents with    Medicare AWV        BP (!) 158/54 (Site: Left Upper Arm)   Pulse 67   Temp 97.9 °F (36.6 °C)   Resp 16   Ht 1.549 m (5' 1\")   Wt 59.6 kg (131 lb 6.4 oz)   SpO2 97%   BMI 24.83 kg/m²      1. Have you been to the ER, urgent care clinic since your last visit?  Hospitalized since your last visit? no    2. Have you seen or consulted any other health care providers outside of the [facility] since your last visit?  Include any pap smears or colon screening. no    Health Maintenance Due   Topic Date Due    DTaP/Tdap/Td vaccine (1 - Tdap) Never done    Shingles vaccine (1 of 2) Never done    Respiratory Syncytial Virus (RSV) Pregnant or age 60 yrs+ (1 - 1-dose 60+ series) Never done    Pneumococcal 65+ years Vaccine (2 of 2 - PPSV23 or PCV20) 10/16/2018    COVID-19 Vaccine (6 - 2023-24 season) 09/01/2023    Depression Screen  05/15/2024             No data to display                 Failed to redirect to the Timeline version of the [employer] SmartLink.    Failed to redirect to the Timeline version of the [employer] SmartLink.       \"Have you been to the ER, urgent care clinic since your last visit?  Hospitalized since your last visit?\"    no    “Have you seen or consulted any other health care providers outside of [facility] since your last visit?”    no            Click Here for Release of Records Request ing

## 2024-04-18 NOTE — PROGRESS NOTES
Medicare Annual Wellness Visit    Saige Kuo is here for Medicare AWV    Assessment & Plan   Essential hypertension  -     Comprehensive Metabolic Panel; Future  -     Lipid Panel; Future  -     Urinalysis; Future  Moderate persistent asthma without complication  Osteopenia of multiple sites  Mixed hyperlipidemia  -     Lipid Panel; Future  Crohn's disease without complication, unspecified gastrointestinal tract location (HCC)  -     CBC with Auto Differential; Future  Stage 3b chronic kidney disease (HCC)  -     CBC with Auto Differential; Future  -     Comprehensive Metabolic Panel; Future  -     Hemoglobin A1C; Future  -     Urinalysis; Future  -     Vitamin D 25 Hydroxy; Future  Medicare annual wellness visit, subsequent    Recommendations for Preventive Services Due: see orders and patient instructions/AVS.  Recommended screening schedule for the next 5-10 years is provided to the patient in written form: see Patient Instructions/AVS.     Return in 6 months (on 10/18/2024).     Subjective       Patient's complete Health Risk Assessment and screening values have been reviewed and are found in Flowsheets. The following problems were reviewed today and where indicated follow up appointments were made and/or referrals ordered.    Positive Risk Factor Screenings with Interventions:       Cognitive:   Clock Drawing Test (CDT): (!) Abnormal  Words recalled: 3 Words Recalled  Total Score: 3  Total Score Interpretation: Normal Mini-Cog  Interventions:  See AVS for additional education material                  Advanced Directives:  Do you have a Living Will?: (!) No    Intervention:  has NO advanced directive - information provided                     Objective   Vitals:    04/18/24 0758   BP: (!) 140/80   Site: Left Upper Arm   Pulse: 67   Resp: 16   Temp: 97.9 °F (36.6 °C)   SpO2: 97%   Weight: 59.6 kg (131 lb 6.4 oz)   Height: 1.549 m (5' 1\")      Body mass index is 24.83 kg/m².               Allergies

## 2024-04-19 LAB
25(OH)D3 SERPL-MCNC: 36 NG/ML (ref 30–100)
ALBUMIN SERPL-MCNC: 4 G/DL (ref 3.5–5)
ALBUMIN/GLOB SERPL: 1 (ref 1.1–2.2)
ALP SERPL-CCNC: 84 U/L (ref 45–117)
ALT SERPL-CCNC: 18 U/L (ref 12–78)
ANION GAP SERPL CALC-SCNC: 6 MMOL/L (ref 5–15)
APPEARANCE UR: CLEAR
AST SERPL-CCNC: 10 U/L (ref 15–37)
BACTERIA URNS QL MICRO: ABNORMAL /HPF
BASOPHILS # BLD: 0 K/UL (ref 0–0.1)
BASOPHILS NFR BLD: 1 % (ref 0–1)
BILIRUB SERPL-MCNC: 0.5 MG/DL (ref 0.2–1)
BILIRUB UR QL: NEGATIVE
BUN SERPL-MCNC: 25 MG/DL (ref 6–20)
BUN/CREAT SERPL: 17 (ref 12–20)
CALCIUM SERPL-MCNC: 9.9 MG/DL (ref 8.5–10.1)
CHLORIDE SERPL-SCNC: 106 MMOL/L (ref 97–108)
CHOLEST SERPL-MCNC: 276 MG/DL
CO2 SERPL-SCNC: 29 MMOL/L (ref 21–32)
COLOR UR: ABNORMAL
CREAT SERPL-MCNC: 1.45 MG/DL (ref 0.55–1.02)
DIFFERENTIAL METHOD BLD: ABNORMAL
EOSINOPHIL # BLD: 0.2 K/UL (ref 0–0.4)
EOSINOPHIL NFR BLD: 3 % (ref 0–7)
EPITH CASTS URNS QL MICRO: ABNORMAL /LPF
ERYTHROCYTE [DISTWIDTH] IN BLOOD BY AUTOMATED COUNT: 18 % (ref 11.5–14.5)
EST. AVERAGE GLUCOSE BLD GHB EST-MCNC: 117 MG/DL
GLOBULIN SER CALC-MCNC: 4 G/DL (ref 2–4)
GLUCOSE SERPL-MCNC: 94 MG/DL (ref 65–100)
GLUCOSE UR STRIP.AUTO-MCNC: NEGATIVE MG/DL
HBA1C MFR BLD: 5.7 % (ref 4–5.6)
HCT VFR BLD AUTO: 38.8 % (ref 35–47)
HDLC SERPL-MCNC: 107 MG/DL
HDLC SERPL: 2.6 (ref 0–5)
HGB BLD-MCNC: 11.9 G/DL (ref 11.5–16)
HGB UR QL STRIP: NEGATIVE
IMM GRANULOCYTES # BLD AUTO: 0 K/UL (ref 0–0.04)
IMM GRANULOCYTES NFR BLD AUTO: 0 % (ref 0–0.5)
KETONES UR QL STRIP.AUTO: NEGATIVE MG/DL
LDLC SERPL CALC-MCNC: 155.4 MG/DL (ref 0–100)
LEUKOCYTE ESTERASE UR QL STRIP.AUTO: ABNORMAL
LYMPHOCYTES # BLD: 2 K/UL (ref 0.8–3.5)
LYMPHOCYTES NFR BLD: 38 % (ref 12–49)
MCH RBC QN AUTO: 22.8 PG (ref 26–34)
MCHC RBC AUTO-ENTMCNC: 30.7 G/DL (ref 30–36.5)
MCV RBC AUTO: 74.2 FL (ref 80–99)
MONOCYTES # BLD: 0.4 K/UL (ref 0–1)
MONOCYTES NFR BLD: 8 % (ref 5–13)
NEUTS SEG # BLD: 2.7 K/UL (ref 1.8–8)
NEUTS SEG NFR BLD: 51 % (ref 32–75)
NITRITE UR QL STRIP.AUTO: NEGATIVE
NRBC # BLD: 0 K/UL (ref 0–0.01)
NRBC BLD-RTO: 0 PER 100 WBC
PH UR STRIP: 5.5 (ref 5–8)
PLATELET # BLD AUTO: 251 K/UL (ref 150–400)
POTASSIUM SERPL-SCNC: 3.9 MMOL/L (ref 3.5–5.1)
PROT SERPL-MCNC: 8 G/DL (ref 6.4–8.2)
PROT UR STRIP-MCNC: NEGATIVE MG/DL
RBC # BLD AUTO: 5.23 M/UL (ref 3.8–5.2)
RBC #/AREA URNS HPF: ABNORMAL /HPF (ref 0–5)
SODIUM SERPL-SCNC: 141 MMOL/L (ref 136–145)
SP GR UR REFRACTOMETRY: 1.02 (ref 1–1.03)
TRIGL SERPL-MCNC: 68 MG/DL
UROBILINOGEN UR QL STRIP.AUTO: 0.2 EU/DL (ref 0.2–1)
VLDLC SERPL CALC-MCNC: 13.6 MG/DL
WBC # BLD AUTO: 5.4 K/UL (ref 3.6–11)
WBC URNS QL MICRO: ABNORMAL /HPF (ref 0–4)

## 2024-05-20 ENCOUNTER — TELEPHONE (OUTPATIENT)
Facility: CLINIC | Age: 79
End: 2024-05-20

## 2024-05-20 NOTE — TELEPHONE ENCOUNTER
Pt called stating she has been experiencing some leg and feet pain at night. Pt stated she will like to come in to see Provider.      Pt scheduled for 05/24/24 @ 8:00am

## 2024-05-24 ENCOUNTER — OFFICE VISIT (OUTPATIENT)
Facility: CLINIC | Age: 79
End: 2024-05-24
Payer: COMMERCIAL

## 2024-05-24 VITALS
WEIGHT: 135 LBS | HEART RATE: 68 BPM | TEMPERATURE: 97.8 F | RESPIRATION RATE: 16 BRPM | DIASTOLIC BLOOD PRESSURE: 70 MMHG | HEIGHT: 61 IN | BODY MASS INDEX: 25.49 KG/M2 | OXYGEN SATURATION: 98 % | SYSTOLIC BLOOD PRESSURE: 144 MMHG

## 2024-05-24 DIAGNOSIS — N18.32 STAGE 3B CHRONIC KIDNEY DISEASE (HCC): ICD-10-CM

## 2024-05-24 DIAGNOSIS — R25.2 LEG CRAMPS: ICD-10-CM

## 2024-05-24 DIAGNOSIS — I10 ESSENTIAL HYPERTENSION: Primary | ICD-10-CM

## 2024-05-24 DIAGNOSIS — E61.1 IRON DEFICIENCY: ICD-10-CM

## 2024-05-24 LAB
IRON SATN MFR SERPL: 24 % (ref 20–50)
IRON SERPL-MCNC: 63 UG/DL (ref 35–150)
MAGNESIUM SERPL-MCNC: 2.2 MG/DL (ref 1.6–2.4)
POTASSIUM SERPL-SCNC: 4 MMOL/L (ref 3.5–5.1)
TIBC SERPL-MCNC: 262 UG/DL (ref 250–450)

## 2024-05-24 PROCEDURE — 99213 OFFICE O/P EST LOW 20 MIN: CPT | Performed by: INTERNAL MEDICINE

## 2024-05-24 PROCEDURE — 3077F SYST BP >= 140 MM HG: CPT | Performed by: INTERNAL MEDICINE

## 2024-05-24 PROCEDURE — 1123F ACP DISCUSS/DSCN MKR DOCD: CPT | Performed by: INTERNAL MEDICINE

## 2024-05-24 PROCEDURE — 3078F DIAST BP <80 MM HG: CPT | Performed by: INTERNAL MEDICINE

## 2024-05-24 RX ORDER — BUDESONIDE 3 MG/1
3 CAPSULE, COATED PELLETS ORAL 3 TIMES DAILY
COMMUNITY

## 2024-05-24 SDOH — ECONOMIC STABILITY: FOOD INSECURITY: WITHIN THE PAST 12 MONTHS, THE FOOD YOU BOUGHT JUST DIDN'T LAST AND YOU DIDN'T HAVE MONEY TO GET MORE.: NEVER TRUE

## 2024-05-24 SDOH — ECONOMIC STABILITY: FOOD INSECURITY: WITHIN THE PAST 12 MONTHS, YOU WORRIED THAT YOUR FOOD WOULD RUN OUT BEFORE YOU GOT MONEY TO BUY MORE.: NEVER TRUE

## 2024-05-24 SDOH — ECONOMIC STABILITY: INCOME INSECURITY: HOW HARD IS IT FOR YOU TO PAY FOR THE VERY BASICS LIKE FOOD, HOUSING, MEDICAL CARE, AND HEATING?: NOT HARD AT ALL

## 2024-05-24 NOTE — PROGRESS NOTES
Saige Kuo is a 78 y.o. female     Chief Complaint   Patient presents with    Dysmenorrhea     Cramping in legs and feet after awaken from sleep for the last few weeks.       BP (!) 144/70 (Site: Left Upper Arm, Position: Sitting, Cuff Size: Medium Adult)   Pulse 68   Temp 97.8 °F (36.6 °C) (Oral)   Resp 16   Ht 1.549 m (5' 1\")   Wt 61.2 kg (135 lb)   SpO2 98%   BMI 25.51 kg/m²     Health Maintenance Due   Topic Date Due    DTaP/Tdap/Td vaccine (1 - Tdap) Never done    Shingles vaccine (1 of 2) Never done    Respiratory Syncytial Virus (RSV) Pregnant or age 60 yrs+ (1 - 1-dose 60+ series) Never done    Pneumococcal 65+ years Vaccine (2 of 2 - PPSV23 or PCV20) 10/16/2018    COVID-19 Vaccine (6 - 2023-24 season) 09/01/2023         \"Have you been to the ER, urgent care clinic since your last visit?  Hospitalized since your last visit?\"    NO    “Have you seen or consulted any other health care providers outside of [facility] since your last visit?”    NO                     
noted      Assessment/Plan:  Saige was seen today for dysmenorrhea.    Diagnoses and all orders for this visit:    Essential hypertension  -     Potassium; Future  -     Magnesium; Future    Stage 3b chronic kidney disease (HCC)  -     Potassium; Future  -     Magnesium; Future  -     Iron and TIBC; Future    Iron deficiency  -     Iron and TIBC; Future    Leg cramps          ICD-10-CM    1. Essential hypertension  I10 Potassium     Magnesium      2. Stage 3b chronic kidney disease (HCC)  N18.32 Potassium     Magnesium     Iron and TIBC      3. Iron deficiency  E61.1 Iron and TIBC      4. Leg cramps  R25.2         Plan:    Follow potassium magnesium and iron to look for evidence of metabolic causes of leg cramps.  Consider taking an over-the-counter magnesium supplement.  Drink plenty of fluids.  May consider changing valsartan HCT to valsartan alone for hypertension along with her other antihypertensives if leg cramps persist.        I have reviewed with the patient details of the assessment and plan and all questions were answered. Relevent patient education was performed. Verbal and/or written instructions (see AVS) provided. The most recent lab findings were reviewed with the patient.  Plan was discussed with patient who verbally expressed understanding.    An After Visit Summary was printed and given to the patient.    Morteza Elam MD

## 2024-06-27 ENCOUNTER — OFFICE VISIT (OUTPATIENT)
Facility: CLINIC | Age: 79
End: 2024-06-27
Payer: COMMERCIAL

## 2024-06-27 VITALS
RESPIRATION RATE: 16 BRPM | HEIGHT: 61 IN | HEART RATE: 57 BPM | SYSTOLIC BLOOD PRESSURE: 142 MMHG | TEMPERATURE: 98 F | BODY MASS INDEX: 25.64 KG/M2 | OXYGEN SATURATION: 100 % | WEIGHT: 135.8 LBS | DIASTOLIC BLOOD PRESSURE: 70 MMHG

## 2024-06-27 DIAGNOSIS — I10 ESSENTIAL HYPERTENSION: Primary | ICD-10-CM

## 2024-06-27 PROCEDURE — 99213 OFFICE O/P EST LOW 20 MIN: CPT | Performed by: INTERNAL MEDICINE

## 2024-06-27 PROCEDURE — 3078F DIAST BP <80 MM HG: CPT | Performed by: INTERNAL MEDICINE

## 2024-06-27 PROCEDURE — 1123F ACP DISCUSS/DSCN MKR DOCD: CPT | Performed by: INTERNAL MEDICINE

## 2024-06-27 PROCEDURE — 3077F SYST BP >= 140 MM HG: CPT | Performed by: INTERNAL MEDICINE

## 2024-06-27 NOTE — PROGRESS NOTES
Saige Kuo is a 79 y.o. female     Chief Complaint   Patient presents with    1 Month Follow-Up       BP (!) 142/70 (Site: Left Upper Arm, Position: Sitting, Cuff Size: Medium Adult)   Pulse 57   Temp 98 °F (36.7 °C) (Oral)   Resp 16   Ht 1.549 m (5' 1\")   Wt 61.6 kg (135 lb 12.8 oz)   SpO2 100%   BMI 25.66 kg/m²     Health Maintenance Due   Topic Date Due    DTaP/Tdap/Td vaccine (1 - Tdap) Never done    Shingles vaccine (1 of 2) Never done    Respiratory Syncytial Virus (RSV) Pregnant or age 60 yrs+ (1 - 1-dose 60+ series) Never done    Pneumococcal 65+ years Vaccine (2 of 2 - PPSV23 or PCV20) 10/16/2018    COVID-19 Vaccine (6 - 2023-24 season) 09/01/2023         \"Have you been to the ER, urgent care clinic since your last visit?  Hospitalized since your last visit?\"    NO    “Have you seen or consulted any other health care providers outside of [facility] since your last visit?”    NO

## 2024-06-27 NOTE — PROGRESS NOTES
Saige Kuo is a 79 y.o. female and presents with 1 Month Follow-Up  .    Subjective:  Mrs. Clarke presents today for 1 month follow-up.  Her blood pressure remains borderline on her current regimen.  Her valsartan HCT was discontinued because of leg cramps despite the fact that her potassium and magnesium appear to be normal.  She has an appointment to see vascular surgery because she continues to experience leg cramps primarily at night.  She had a recent iron profile that was normal.  She has no shortness of breath, chest pain, palpitations, PND, orthopnea, or pedal edema.    Past Medical History:   Diagnosis Date    Allergic rhinitis 06/28/2017    Asthma 06/28/2017    Impressio: continue nebs and Dulera, prednisone taper    Bradycardia 06/28/2017    Impression: asymptomatic, continue Bystolic    Cataract, bilateral 06/28/2017    Impression: low risk for elective surgical procedure, proceed without further risk stratification, EKG shows sinus bebeto without acute ST-T changes which is her baseline    Crohn's disease (HCC) 06/28/2017    Impression: GI following    Degenerative arthritis 06/28/2017    Hair loss 06/28/2017    Impression: refer to derm    Herpes Dx 2015    no outbreak as of 5/2/16    Hyperlipidemia 06/28/2017    Hypertension     Hypokalemia 06/28/2017    LTBI (latent tuberculosis infection)     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, 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 - had neg PPD after this    MRSA (methicillin resistant staph aureus) culture positive \"many years ago\" as of 5/2/16    pt states + nasal swab \"many years ago\", Tx and no + since; UNCONFIRMED    Pneumonia 06/28/2017    Impression:  Completed course of antibiotics, clinically resolved Status is Inactive    Syncope     Moderna vaccine     Past Surgical History:   Procedure Laterality Date    CATARACT REMOVAL Right 3/28/16    MOHS SURGERY Right     ORTHOPEDIC SURGERY Right     trigger release      ORTHOPEDIC SURGERY Left     left knee sx

## 2024-07-22 ENCOUNTER — TRANSCRIBE ORDERS (OUTPATIENT)
Facility: HOSPITAL | Age: 79
End: 2024-07-22

## 2024-07-22 DIAGNOSIS — Z12.31 SCREENING MAMMOGRAM FOR BREAST CANCER: Primary | ICD-10-CM

## 2024-08-05 DIAGNOSIS — I10 ESSENTIAL HYPERTENSION: ICD-10-CM

## 2024-08-05 RX ORDER — VALSARTAN 160 MG/1
160 TABLET ORAL DAILY
Qty: 90 TABLET | Refills: 1 | Status: SHIPPED | OUTPATIENT
Start: 2024-08-05

## 2024-08-05 NOTE — TELEPHONE ENCOUNTER
PCP: DANN Elam MD    Last appt: 6/27/2024    Future Appointments   Date Time Provider Department Center   8/16/2024  8:00 AM [facility]   10/22/2024  8:00 AM DANN Elam MD [facility]       Requested Prescriptions     Pending Prescriptions Disp Refills    valsartan (DIOVAN) 160 MG tablet 90 tablet 1     Sig: Take 1 tablet by mouth daily

## 2024-08-16 ENCOUNTER — HOSPITAL ENCOUNTER (OUTPATIENT)
Facility: HOSPITAL | Age: 79
Discharge: HOME | End: 2024-08-16
Attending: INTERNAL MEDICINE
Payer: COMMERCIAL

## 2024-08-16 VITALS — BODY MASS INDEX: 25.49 KG/M2 | WEIGHT: 135 LBS | HEIGHT: 61 IN

## 2024-08-16 DIAGNOSIS — Z12.31 SCREENING MAMMOGRAM FOR BREAST CANCER: ICD-10-CM

## 2024-08-16 PROCEDURE — 77063 BREAST TOMOSYNTHESIS BI: CPT

## 2024-10-14 ENCOUNTER — APPOINTMENT (OUTPATIENT)
Facility: HOSPITAL | Age: 79
End: 2024-10-14
Payer: COMMERCIAL

## 2024-10-14 ENCOUNTER — HOSPITAL ENCOUNTER (EMERGENCY)
Facility: HOSPITAL | Age: 79
Discharge: HOME | End: 2024-10-14
Attending: STUDENT IN AN ORGANIZED HEALTH CARE EDUCATION/TRAINING PROGRAM
Payer: COMMERCIAL

## 2024-10-14 VITALS
TEMPERATURE: 98.1 F | HEIGHT: 61 IN | SYSTOLIC BLOOD PRESSURE: 166 MMHG | RESPIRATION RATE: 28 BRPM | BODY MASS INDEX: 25.49 KG/M2 | DIASTOLIC BLOOD PRESSURE: 83 MMHG | WEIGHT: 135 LBS | HEART RATE: 82 BPM

## 2024-10-14 DIAGNOSIS — R42 LIGHTHEADEDNESS: ICD-10-CM

## 2024-10-14 DIAGNOSIS — M25.511 ACUTE PAIN OF RIGHT SHOULDER: Primary | ICD-10-CM

## 2024-10-14 LAB
ALBUMIN SERPL-MCNC: 3.4 G/DL (ref 3.5–5)
ALBUMIN/GLOB SERPL: 0.8 (ref 1.1–2.2)
ALP SERPL-CCNC: 84 U/L (ref 45–117)
ALT SERPL-CCNC: 13 U/L (ref 12–78)
ANION GAP SERPL CALC-SCNC: 6 MMOL/L (ref 2–12)
AST SERPL-CCNC: 9 U/L (ref 15–37)
BASOPHILS # BLD: 0 K/UL (ref 0–0.1)
BASOPHILS NFR BLD: 0 % (ref 0–1)
BILIRUB SERPL-MCNC: 0.5 MG/DL (ref 0.2–1)
BUN SERPL-MCNC: 14 MG/DL (ref 6–20)
BUN/CREAT SERPL: 14 (ref 12–20)
CALCIUM SERPL-MCNC: 9.6 MG/DL (ref 8.5–10.1)
CHLORIDE SERPL-SCNC: 102 MMOL/L (ref 97–108)
CO2 SERPL-SCNC: 27 MMOL/L (ref 21–32)
CREAT SERPL-MCNC: 1.01 MG/DL (ref 0.55–1.02)
DIFFERENTIAL METHOD BLD: ABNORMAL
EKG ATRIAL RATE: 67 BPM
EKG DIAGNOSIS: NORMAL
EKG P AXIS: 62 DEGREES
EKG P-R INTERVAL: 166 MS
EKG Q-T INTERVAL: 406 MS
EKG QRS DURATION: 76 MS
EKG QTC CALCULATION (BAZETT): 429 MS
EKG R AXIS: 13 DEGREES
EKG T AXIS: 98 DEGREES
EKG VENTRICULAR RATE: 67 BPM
EOSINOPHIL # BLD: 0 K/UL (ref 0–0.4)
EOSINOPHIL NFR BLD: 0 % (ref 0–7)
ERYTHROCYTE [DISTWIDTH] IN BLOOD BY AUTOMATED COUNT: 15.9 % (ref 11.5–14.5)
GLOBULIN SER CALC-MCNC: 4.3 G/DL (ref 2–4)
GLUCOSE SERPL-MCNC: 123 MG/DL (ref 65–100)
HCT VFR BLD AUTO: 34.8 % (ref 35–47)
HGB BLD-MCNC: 10.7 G/DL (ref 11.5–16)
IMM GRANULOCYTES # BLD AUTO: 0 K/UL (ref 0–0.04)
IMM GRANULOCYTES NFR BLD AUTO: 0 % (ref 0–0.5)
LYMPHOCYTES # BLD: 0.7 K/UL (ref 0.8–3.5)
LYMPHOCYTES NFR BLD: 7 % (ref 12–49)
MCH RBC QN AUTO: 22.6 PG (ref 26–34)
MCHC RBC AUTO-ENTMCNC: 30.7 G/DL (ref 30–36.5)
MCV RBC AUTO: 73.6 FL (ref 80–99)
MONOCYTES # BLD: 0.6 K/UL (ref 0–1)
MONOCYTES NFR BLD: 6 % (ref 5–13)
NEUTS SEG # BLD: 8.9 K/UL (ref 1.8–8)
NEUTS SEG NFR BLD: 87 % (ref 32–75)
NRBC # BLD: 0 K/UL (ref 0–0.01)
NRBC BLD-RTO: 0 PER 100 WBC
PLATELET # BLD AUTO: ABNORMAL K/UL (ref 150–400)
POTASSIUM SERPL-SCNC: 3.6 MMOL/L (ref 3.5–5.1)
PROT SERPL-MCNC: 7.7 G/DL (ref 6.4–8.2)
RBC # BLD AUTO: 4.73 M/UL (ref 3.8–5.2)
RBC MORPH BLD: ABNORMAL
SODIUM SERPL-SCNC: 135 MMOL/L (ref 136–145)
WBC # BLD AUTO: 10.2 K/UL (ref 3.6–11)

## 2024-10-14 PROCEDURE — 6360000002 HC RX W HCPCS: Performed by: STUDENT IN AN ORGANIZED HEALTH CARE EDUCATION/TRAINING PROGRAM

## 2024-10-14 PROCEDURE — 36415 COLL VENOUS BLD VENIPUNCTURE: CPT

## 2024-10-14 PROCEDURE — 80053 COMPREHEN METABOLIC PANEL: CPT

## 2024-10-14 PROCEDURE — 73030 X-RAY EXAM OF SHOULDER: CPT

## 2024-10-14 PROCEDURE — 96374 THER/PROPH/DIAG INJ IV PUSH: CPT

## 2024-10-14 PROCEDURE — 85025 COMPLETE CBC W/AUTO DIFF WBC: CPT

## 2024-10-14 PROCEDURE — 93005 ELECTROCARDIOGRAM TRACING: CPT

## 2024-10-14 PROCEDURE — 99285 EMERGENCY DEPT VISIT HI MDM: CPT

## 2024-10-14 RX ORDER — TRAMADOL HYDROCHLORIDE 50 MG/1
50 TABLET ORAL EVERY 8 HOURS PRN
Qty: 6 TABLET | Refills: 0 | Status: SHIPPED | OUTPATIENT
Start: 2024-10-14 | End: 2024-10-17

## 2024-10-14 RX ORDER — FENTANYL CITRATE 50 UG/ML
50 INJECTION, SOLUTION INTRAMUSCULAR; INTRAVENOUS
Status: COMPLETED | OUTPATIENT
Start: 2024-10-14 | End: 2024-10-14

## 2024-10-14 RX ADMIN — FENTANYL CITRATE 50 MCG: 50 INJECTION INTRAMUSCULAR; INTRAVENOUS at 11:20

## 2024-10-14 ASSESSMENT — PAIN SCALES - GENERAL: PAINLEVEL_OUTOF10: 10

## 2024-10-14 ASSESSMENT — PAIN DESCRIPTION - LOCATION: LOCATION: ARM

## 2024-10-14 ASSESSMENT — PAIN - FUNCTIONAL ASSESSMENT: PAIN_FUNCTIONAL_ASSESSMENT: 0-10

## 2024-10-14 NOTE — ED PROVIDER NOTES
EMERGENCY DEPARTMENT HISTORY AND PHYSICAL EXAM      Date: 10/14/2024  Patient Name: Saige Kuo    History of Presenting Illness     Chief Complaint   Patient presents with    Arm Pain    Dizziness     Pt is wheeled into the ED with c/o right shoulder pain.  Pt has limited movement in right arm.  Pt states she also feels dizzy when she stands.         HPI: History From: patient, History limited by: none  Saige Kuo, 79 y.o. female presents to the ED with cc of right shoulder pain and episode of dizziness.  She has a history of prior rotator cuff surgery on the right shoulder.  She was trimming shrubs on Saturday, and has had pain in the right shoulder since then especially with movement.  This morning, she stood up, and became lightheaded.  She describes a sensation of about to pass out.  This happened previously in August.  When she sat down and put her head down and ate something she felt better.  She denies dizziness currently.  She denies any associated chest pain or shortness of breath.  No weakness numbness or tingling in the arms or legs, no speech or vision changes.  She has a history of Crohn's disease and hypertension.          There are no other complaints, changes, or physical findings at this time.    PCP: DANN Elam MD    No current facility-administered medications on file prior to encounter.     Current Outpatient Medications on File Prior to Encounter   Medication Sig Dispense Refill    valsartan (DIOVAN) 160 MG tablet Take 1 tablet by mouth daily 90 tablet 1    budesonide (ENTOCORT EC) 3 MG delayed release capsule Take 1 capsule by mouth 3 times daily      famotidine (PEPCID) 40 MG tablet Take 1 tablet by mouth 2 times daily      amLODIPine (NORVASC) 10 MG tablet Take 1 tablet by mouth daily 90 tablet 3    vitamin D (ERGOCALCIFEROL) 1.25 MG (33136 UT) CAPS capsule Take 1 capsule by mouth once a week 12 capsule 0    Tuberculin-Allergy Syringes (ALLERGIST TRAY 1CC 26GX1/2\") 26G

## 2024-10-18 NOTE — TELEPHONE ENCOUNTER
Post-Op Assessment Note    CV Status:  Stable  Pain Score: 0    Pain management: adequate       Mental Status:  Sleepy   Hydration Status:  Euvolemic   PONV Controlled:  Controlled   Airway Patency:  Patent     Post Op Vitals Reviewed: Yes    No anethesia notable event occurred.    Staff: CRNA           Last Filed PACU Vitals:  Vitals Value Taken Time   Temp     Pulse 70    /69    Resp 14    SpO2 99%        Modified Hussain:  Activity: 2 (10/18/2024 10:49 AM)  Respiration: 2 (10/18/2024 10:49 AM)  Circulation: 2 (10/18/2024 10:49 AM)  Consciousness: 2 (10/18/2024 10:49 AM)  Oxygen Saturation: 2 (10/18/2024 10:49 AM)  Modified Hussain Score: 10 (10/18/2024 10:49 AM)         valsartan (DIOVAN) 160 MG tablet 90 tablet 3 5/28/2024 --    Sig - Route: Take 1 tablet by mouth daily - Oral      Last visit 6/27/24  Future appt 10/22/24    Pharmacy [facility]

## 2024-10-22 ENCOUNTER — OFFICE VISIT (OUTPATIENT)
Facility: CLINIC | Age: 79
End: 2024-10-22
Payer: COMMERCIAL

## 2024-10-22 VITALS
DIASTOLIC BLOOD PRESSURE: 82 MMHG | HEART RATE: 81 BPM | SYSTOLIC BLOOD PRESSURE: 140 MMHG | BODY MASS INDEX: 26.28 KG/M2 | TEMPERATURE: 98.3 F | RESPIRATION RATE: 18 BRPM | OXYGEN SATURATION: 100 % | WEIGHT: 139.2 LBS | HEIGHT: 61 IN

## 2024-10-22 DIAGNOSIS — Z23 FLU VACCINE NEED: ICD-10-CM

## 2024-10-22 DIAGNOSIS — M85.89 OSTEOPENIA OF MULTIPLE SITES: ICD-10-CM

## 2024-10-22 DIAGNOSIS — R73.09 ELEVATED GLUCOSE: ICD-10-CM

## 2024-10-22 DIAGNOSIS — E78.2 MIXED HYPERLIPIDEMIA: ICD-10-CM

## 2024-10-22 DIAGNOSIS — I10 ESSENTIAL HYPERTENSION: Primary | ICD-10-CM

## 2024-10-22 DIAGNOSIS — D50.9 IRON DEFICIENCY ANEMIA, UNSPECIFIED IRON DEFICIENCY ANEMIA TYPE: ICD-10-CM

## 2024-10-22 DIAGNOSIS — E55.9 VITAMIN D DEFICIENCY: ICD-10-CM

## 2024-10-22 DIAGNOSIS — K50.90 CROHN'S DISEASE WITHOUT COMPLICATION, UNSPECIFIED GASTROINTESTINAL TRACT LOCATION (HCC): ICD-10-CM

## 2024-10-22 DIAGNOSIS — J45.40 MODERATE PERSISTENT ASTHMA WITHOUT COMPLICATION: ICD-10-CM

## 2024-10-22 LAB
25(OH)D3 SERPL-MCNC: 26.3 NG/ML (ref 30–100)
ALBUMIN SERPL-MCNC: 3.6 G/DL (ref 3.5–5)
ALBUMIN/GLOB SERPL: 0.9 (ref 1.1–2.2)
ALP SERPL-CCNC: 87 U/L (ref 45–117)
ALT SERPL-CCNC: 15 U/L (ref 12–78)
ANION GAP SERPL CALC-SCNC: 3 MMOL/L (ref 2–12)
AST SERPL-CCNC: 8 U/L (ref 15–37)
BASOPHILS # BLD: 0 K/UL (ref 0–0.1)
BASOPHILS NFR BLD: 1 % (ref 0–1)
BILIRUB SERPL-MCNC: 0.3 MG/DL (ref 0.2–1)
BUN SERPL-MCNC: 14 MG/DL (ref 6–20)
BUN/CREAT SERPL: 14 (ref 12–20)
CALCIUM SERPL-MCNC: 10 MG/DL (ref 8.5–10.1)
CHLORIDE SERPL-SCNC: 108 MMOL/L (ref 97–108)
CHOLEST SERPL-MCNC: 227 MG/DL
CO2 SERPL-SCNC: 29 MMOL/L (ref 21–32)
CREAT SERPL-MCNC: 1.02 MG/DL (ref 0.55–1.02)
DIFFERENTIAL METHOD BLD: ABNORMAL
EOSINOPHIL # BLD: 0.1 K/UL (ref 0–0.4)
EOSINOPHIL NFR BLD: 2 % (ref 0–7)
ERYTHROCYTE [DISTWIDTH] IN BLOOD BY AUTOMATED COUNT: 16.9 % (ref 11.5–14.5)
EST. AVERAGE GLUCOSE BLD GHB EST-MCNC: 126 MG/DL
GLOBULIN SER CALC-MCNC: 3.9 G/DL (ref 2–4)
GLUCOSE SERPL-MCNC: 84 MG/DL (ref 65–100)
HBA1C MFR BLD: 6 % (ref 4–5.6)
HCT VFR BLD AUTO: 38.3 % (ref 35–47)
HDLC SERPL-MCNC: 98 MG/DL
HDLC SERPL: 2.3 (ref 0–5)
HGB BLD-MCNC: 11.3 G/DL (ref 11.5–16)
IMM GRANULOCYTES # BLD AUTO: 0 K/UL (ref 0–0.04)
IMM GRANULOCYTES NFR BLD AUTO: 1 % (ref 0–0.5)
IRON SATN MFR SERPL: 31 % (ref 20–50)
IRON SERPL-MCNC: 78 UG/DL (ref 35–150)
LDLC SERPL CALC-MCNC: 111.4 MG/DL (ref 0–100)
LYMPHOCYTES # BLD: 2 K/UL (ref 0.8–3.5)
LYMPHOCYTES NFR BLD: 33 % (ref 12–49)
MCH RBC QN AUTO: 22.7 PG (ref 26–34)
MCHC RBC AUTO-ENTMCNC: 29.5 G/DL (ref 30–36.5)
MCV RBC AUTO: 76.9 FL (ref 80–99)
MONOCYTES # BLD: 0.5 K/UL (ref 0–1)
MONOCYTES NFR BLD: 8 % (ref 5–13)
NEUTS SEG # BLD: 3.5 K/UL (ref 1.8–8)
NEUTS SEG NFR BLD: 57 % (ref 32–75)
NRBC # BLD: 0 K/UL (ref 0–0.01)
NRBC BLD-RTO: 0 PER 100 WBC
PLATELET # BLD AUTO: 480 K/UL (ref 150–400)
POTASSIUM SERPL-SCNC: 4.1 MMOL/L (ref 3.5–5.1)
PROT SERPL-MCNC: 7.5 G/DL (ref 6.4–8.2)
RBC # BLD AUTO: 4.98 M/UL (ref 3.8–5.2)
SODIUM SERPL-SCNC: 140 MMOL/L (ref 136–145)
TIBC SERPL-MCNC: 251 UG/DL (ref 250–450)
TRIGL SERPL-MCNC: 88 MG/DL
VLDLC SERPL CALC-MCNC: 17.6 MG/DL
WBC # BLD AUTO: 6.1 K/UL (ref 3.6–11)

## 2024-10-22 PROCEDURE — 99214 OFFICE O/P EST MOD 30 MIN: CPT | Performed by: INTERNAL MEDICINE

## 2024-10-22 PROCEDURE — 90653 IIV ADJUVANT VACCINE IM: CPT | Performed by: INTERNAL MEDICINE

## 2024-10-22 PROCEDURE — 90471 IMMUNIZATION ADMIN: CPT | Performed by: INTERNAL MEDICINE

## 2024-10-22 PROCEDURE — 3079F DIAST BP 80-89 MM HG: CPT | Performed by: INTERNAL MEDICINE

## 2024-10-22 PROCEDURE — 3077F SYST BP >= 140 MM HG: CPT | Performed by: INTERNAL MEDICINE

## 2024-10-22 PROCEDURE — 1123F ACP DISCUSS/DSCN MKR DOCD: CPT | Performed by: INTERNAL MEDICINE

## 2024-10-22 NOTE — PROGRESS NOTES
Saige Kuo is a 79 y.o. female     Chief Complaint   Patient presents with    6 Month Follow-Up       BP (!) 140/82 (Site: Left Upper Arm, Position: Sitting, Cuff Size: Medium Adult)   Pulse 81   Temp 98.3 °F (36.8 °C) (Temporal)   Resp 18   Ht 1.549 m (5' 1\")   Wt 63.1 kg (139 lb 3.2 oz)   SpO2 100%   BMI 26.30 kg/m²     Health Maintenance Due   Topic Date Due    DTaP/Tdap/Td vaccine (1 - Tdap) Never done    Shingles vaccine (1 of 2) Never done    Respiratory Syncytial Virus (RSV) Pregnant or age 60 yrs+ (1 - 1-dose 60+ series) Never done    Pneumococcal 65+ years Vaccine (2 of 2 - PPSV23 or PCV20) 10/16/2018    Flu vaccine (1) 08/01/2024    COVID-19 Vaccine (6 - 2023-24 season) 09/01/2024         \"Have you been to the ER, urgent care clinic since your last visit?  Hospitalized since your last visit?\"    10/14/24; [facility] [place] [facility]      “Have you seen or consulted any other health care providers outside of [facility] since your last visit?”    NO

## 2024-10-23 LAB
APPEARANCE UR: CLEAR
BILIRUB UR QL: NEGATIVE
COLOR UR: NORMAL
GLUCOSE UR STRIP.AUTO-MCNC: NEGATIVE MG/DL
HGB UR QL STRIP: NEGATIVE
KETONES UR QL STRIP.AUTO: NEGATIVE MG/DL
LEUKOCYTE ESTERASE UR QL STRIP.AUTO: NEGATIVE
NITRITE UR QL STRIP.AUTO: NEGATIVE
PH UR STRIP: 6 (ref 5–8)
PROT UR STRIP-MCNC: NEGATIVE MG/DL
SP GR UR REFRACTOMETRY: 1.01 (ref 1–1.03)
UROBILINOGEN UR QL STRIP.AUTO: 0.2 EU/DL (ref 0.2–1)

## 2024-10-29 NOTE — TELEPHONE ENCOUNTER
PCP: DANN Elam MD    Last appt: 10/22/2024    Future Appointments   Date Time Provider Department Center   4/23/2025  8:00 AM DANN Elam MD [facility] DEP       Requested Prescriptions     Pending Prescriptions Disp Refills    amLODIPine (NORVASC) 10 MG tablet 90 tablet 3     Sig: Take 1 tablet by mouth daily

## 2024-10-30 RX ORDER — AMLODIPINE BESYLATE 10 MG/1
10 TABLET ORAL DAILY
Qty: 90 TABLET | Refills: 3 | Status: SHIPPED | OUTPATIENT
Start: 2024-10-30

## 2024-10-30 RX ORDER — AMLODIPINE BESYLATE 10 MG/1
10 TABLET ORAL DAILY
Qty: 90 TABLET | Refills: 3 | OUTPATIENT
Start: 2024-10-30

## 2024-10-30 NOTE — TELEPHONE ENCOUNTER
PCP: DANN Elam MD    Last appt: 10/22/2024    Future Appointments   Date Time Provider Department Center   4/23/2025  8:00 AM DANN Elam MD [facility] DEP       Requested Prescriptions     Pending Prescriptions Disp Refills    amLODIPine (NORVASC) 10 MG tablet [Pharmacy Med Name: AMLODIPINE TAB 10MG] 90 tablet 3     Sig: TAKE 1 TABLET DAILY

## 2024-11-13 DIAGNOSIS — I10 ESSENTIAL HYPERTENSION: ICD-10-CM

## 2024-11-13 RX ORDER — AMLODIPINE BESYLATE 10 MG/1
10 TABLET ORAL DAILY
Qty: 90 TABLET | Refills: 3 | Status: SHIPPED | OUTPATIENT
Start: 2024-11-13

## 2024-11-13 RX ORDER — VALSARTAN 160 MG/1
160 TABLET ORAL DAILY
Qty: 90 TABLET | Refills: 1 | Status: SHIPPED | OUTPATIENT
Start: 2024-11-13

## 2024-11-13 NOTE — TELEPHONE ENCOUNTER
PCP: DANN Elam MD    Last appt: 10/22/2024    Future Appointments   Date Time Provider Department Center   4/23/2025  8:00 AM DANN Elam MD [facility] DEP       Requested Prescriptions     Pending Prescriptions Disp Refills    valsartan (DIOVAN) 160 MG tablet 90 tablet 1     Sig: Take 1 tablet by mouth daily    amLODIPine (NORVASC) 10 MG tablet 90 tablet 3     Sig: Take 1 tablet by mouth daily

## 2024-11-13 NOTE — TELEPHONE ENCOUNTER
valsartan (DIOVAN) 160 MG tablet 90 tablet 1 8/5/2024 --    Sig - Route: Take 1 tablet by mouth daily - Oral      amLODIPine (NORVASC) 10 MG tablet 90 tablet 3 10/30/2024 --    Sig - Route: TAKE 1 TABLET DAILY - Oral      Last visit 10/22/24  Future appt 4/23/25    Pharmacy [facility]

## 2025-04-23 ENCOUNTER — OFFICE VISIT (OUTPATIENT)
Facility: CLINIC | Age: 80
End: 2025-04-23
Payer: COMMERCIAL

## 2025-04-23 VITALS
HEART RATE: 73 BPM | HEIGHT: 61 IN | BODY MASS INDEX: 25.41 KG/M2 | RESPIRATION RATE: 18 BRPM | WEIGHT: 134.6 LBS | DIASTOLIC BLOOD PRESSURE: 72 MMHG | TEMPERATURE: 98.8 F | SYSTOLIC BLOOD PRESSURE: 132 MMHG | OXYGEN SATURATION: 98 %

## 2025-04-23 DIAGNOSIS — I10 ESSENTIAL HYPERTENSION: ICD-10-CM

## 2025-04-23 DIAGNOSIS — Z00.00 MEDICARE ANNUAL WELLNESS VISIT, SUBSEQUENT: Primary | ICD-10-CM

## 2025-04-23 DIAGNOSIS — R73.02 IGT (IMPAIRED GLUCOSE TOLERANCE): ICD-10-CM

## 2025-04-23 DIAGNOSIS — E55.9 VITAMIN D DEFICIENCY: ICD-10-CM

## 2025-04-23 DIAGNOSIS — J45.40 MODERATE PERSISTENT ASTHMA WITHOUT COMPLICATION: ICD-10-CM

## 2025-04-23 DIAGNOSIS — M85.89 OSTEOPENIA OF MULTIPLE SITES: ICD-10-CM

## 2025-04-23 DIAGNOSIS — M15.0 PRIMARY OSTEOARTHRITIS INVOLVING MULTIPLE JOINTS: ICD-10-CM

## 2025-04-23 DIAGNOSIS — R01.1 HEART MURMUR, SYSTOLIC: ICD-10-CM

## 2025-04-23 DIAGNOSIS — E78.2 MIXED HYPERLIPIDEMIA: ICD-10-CM

## 2025-04-23 DIAGNOSIS — K21.9 GASTROESOPHAGEAL REFLUX DISEASE, UNSPECIFIED WHETHER ESOPHAGITIS PRESENT: ICD-10-CM

## 2025-04-23 DIAGNOSIS — K50.90 CROHN'S DISEASE WITHOUT COMPLICATION, UNSPECIFIED GASTROINTESTINAL TRACT LOCATION (HCC): ICD-10-CM

## 2025-04-23 DIAGNOSIS — N18.32 STAGE 3B CHRONIC KIDNEY DISEASE (HCC): ICD-10-CM

## 2025-04-23 LAB
25(OH)D3 SERPL-MCNC: 37.2 NG/ML (ref 30–100)
ALBUMIN SERPL-MCNC: 4 G/DL (ref 3.5–5)
ALBUMIN/GLOB SERPL: 1.1 (ref 1.1–2.2)
ALP SERPL-CCNC: 92 U/L (ref 45–117)
ALT SERPL-CCNC: 33 U/L (ref 12–78)
ANION GAP SERPL CALC-SCNC: 5 MMOL/L (ref 2–12)
APPEARANCE UR: CLEAR
AST SERPL-CCNC: 14 U/L (ref 15–37)
BASOPHILS # BLD: 0.02 K/UL (ref 0–0.1)
BASOPHILS NFR BLD: 0.2 % (ref 0–1)
BILIRUB SERPL-MCNC: 0.4 MG/DL (ref 0.2–1)
BILIRUB UR QL: NEGATIVE
BUN SERPL-MCNC: 21 MG/DL (ref 6–20)
BUN/CREAT SERPL: 18 (ref 12–20)
CALCIUM SERPL-MCNC: 9.6 MG/DL (ref 8.5–10.1)
CHLORIDE SERPL-SCNC: 104 MMOL/L (ref 97–108)
CHOLEST SERPL-MCNC: 248 MG/DL
CO2 SERPL-SCNC: 30 MMOL/L (ref 21–32)
COLOR UR: NORMAL
CREAT SERPL-MCNC: 1.18 MG/DL (ref 0.55–1.02)
DIFFERENTIAL METHOD BLD: ABNORMAL
EOSINOPHIL # BLD: 0.03 K/UL (ref 0–0.4)
EOSINOPHIL NFR BLD: 0.3 % (ref 0–7)
ERYTHROCYTE [DISTWIDTH] IN BLOOD BY AUTOMATED COUNT: 17.2 % (ref 11.5–14.5)
EST. AVERAGE GLUCOSE BLD GHB EST-MCNC: 120 MG/DL
GLOBULIN SER CALC-MCNC: 3.8 G/DL (ref 2–4)
GLUCOSE SERPL-MCNC: 82 MG/DL (ref 65–100)
GLUCOSE UR STRIP.AUTO-MCNC: NEGATIVE MG/DL
HBA1C MFR BLD: 5.8 % (ref 4–5.6)
HCT VFR BLD AUTO: 41.9 % (ref 35–47)
HDLC SERPL-MCNC: 115 MG/DL
HDLC SERPL: 2.2 (ref 0–5)
HGB BLD-MCNC: 12.4 G/DL (ref 11.5–16)
HGB UR QL STRIP: NEGATIVE
IMM GRANULOCYTES # BLD AUTO: 0.08 K/UL (ref 0–0.04)
IMM GRANULOCYTES NFR BLD AUTO: 0.7 % (ref 0–0.5)
KETONES UR QL STRIP.AUTO: NEGATIVE MG/DL
LDLC SERPL CALC-MCNC: 113.6 MG/DL (ref 0–100)
LEUKOCYTE ESTERASE UR QL STRIP.AUTO: NEGATIVE
LYMPHOCYTES # BLD: 3.26 K/UL (ref 0.8–3.5)
LYMPHOCYTES NFR BLD: 30.6 % (ref 12–49)
MCH RBC QN AUTO: 22.3 PG (ref 26–34)
MCHC RBC AUTO-ENTMCNC: 29.6 G/DL (ref 30–36.5)
MCV RBC AUTO: 75.5 FL (ref 80–99)
MONOCYTES # BLD: 1.04 K/UL (ref 0–1)
MONOCYTES NFR BLD: 9.7 % (ref 5–13)
NEUTS SEG # BLD: 6.24 K/UL (ref 1.8–8)
NEUTS SEG NFR BLD: 58.5 % (ref 32–75)
NITRITE UR QL STRIP.AUTO: NEGATIVE
NRBC # BLD: 0 K/UL (ref 0–0.01)
NRBC BLD-RTO: 0 PER 100 WBC
PH UR STRIP: 6 (ref 5–8)
PLATELET # BLD AUTO: 362 K/UL (ref 150–400)
POTASSIUM SERPL-SCNC: 4.7 MMOL/L (ref 3.5–5.1)
PROT SERPL-MCNC: 7.8 G/DL (ref 6.4–8.2)
PROT UR STRIP-MCNC: NEGATIVE MG/DL
RBC # BLD AUTO: 5.55 M/UL (ref 3.8–5.2)
SODIUM SERPL-SCNC: 139 MMOL/L (ref 136–145)
SP GR UR REFRACTOMETRY: <1.005 (ref 1–1.03)
TRIGL SERPL-MCNC: 97 MG/DL
UROBILINOGEN UR QL STRIP.AUTO: 0.2 EU/DL (ref 0.2–1)
VLDLC SERPL CALC-MCNC: 19.4 MG/DL
WBC # BLD AUTO: 10.7 K/UL (ref 3.6–11)

## 2025-04-23 PROCEDURE — 1123F ACP DISCUSS/DSCN MKR DOCD: CPT | Performed by: INTERNAL MEDICINE

## 2025-04-23 PROCEDURE — 3075F SYST BP GE 130 - 139MM HG: CPT | Performed by: INTERNAL MEDICINE

## 2025-04-23 PROCEDURE — 3078F DIAST BP <80 MM HG: CPT | Performed by: INTERNAL MEDICINE

## 2025-04-23 PROCEDURE — G0439 PPPS, SUBSEQ VISIT: HCPCS | Performed by: INTERNAL MEDICINE

## 2025-04-23 SDOH — ECONOMIC STABILITY: FOOD INSECURITY: WITHIN THE PAST 12 MONTHS, THE FOOD YOU BOUGHT JUST DIDN'T LAST AND YOU DIDN'T HAVE MONEY TO GET MORE.: NEVER TRUE

## 2025-04-23 SDOH — ECONOMIC STABILITY: FOOD INSECURITY: WITHIN THE PAST 12 MONTHS, YOU WORRIED THAT YOUR FOOD WOULD RUN OUT BEFORE YOU GOT MONEY TO BUY MORE.: NEVER TRUE

## 2025-04-23 ASSESSMENT — PATIENT HEALTH QUESTIONNAIRE - PHQ9
SUM OF ALL RESPONSES TO PHQ QUESTIONS 1-9: 0
2. FEELING DOWN, DEPRESSED OR HOPELESS: NOT AT ALL
1. LITTLE INTEREST OR PLEASURE IN DOING THINGS: NOT AT ALL

## 2025-04-23 NOTE — PROGRESS NOTES
Saige Kuo is a 79 y.o. female     Chief Complaint   Patient presents with    Medicare AWV       /72 (BP Site: Left Upper Arm, Patient Position: Sitting, BP Cuff Size: Medium Adult)   Pulse 73   Temp 98.8 °F (37.1 °C) (Temporal)   Resp 18   Ht 1.549 m (5' 1\")   Wt 61.1 kg (134 lb 9.6 oz)   SpO2 98%   BMI 25.43 kg/m²     Health Maintenance Due   Topic Date Due    DTaP/Tdap/Td vaccine (1 - Tdap) Never done    Shingles vaccine (1 of 2) Never done    Pneumococcal 50+ years Vaccine (2 of 2 - PPSV23) 10/16/2018    Respiratory Syncytial Virus (RSV) Pregnant or age 60 yrs+ (1 - 1-dose 75+ series) Never done    COVID-19 Vaccine (6 - 2024-25 season) 09/01/2024    Depression Screen  04/18/2025         \"Have you been to the ER, urgent care clinic since your last visit?  Hospitalized since your last visit?\"    4/18/25; [facility]    “Have you seen or consulted any other health care providers outside of [facility] since your last visit?”    NO    Colonoscopy May 14 2025              
Yes Provider, MD Cleopatra   Respiratory Therapy Supplies (NEBULIZER AIR TUBE/PLUGS) MISC Use as directed Yes Provider, MD Cleopatra   olopatadine (PATANASE) 0.6 % SOLN nassl soln olopatadine 0.6 % nasal spray Yes Provider, MD Cleopatra   omeprazole (PRILOSEC) 40 MG delayed release capsule  Yes Provider, MD Cleopatra   Probiotic Product (ACIDOPHILUS PROBIOTIC) CAPS capsule Take 1 tablet by mouth daily Yes Provider, MD Cleopatra   albuterol sulfate HFA (PROVENTIL;VENTOLIN;PROAIR) 108 (90 Base) MCG/ACT inhaler Inhale into the lungs as needed Yes Automatic Reconciliation, Ar   albuterol (PROVENTIL) (2.5 MG/3ML) 0.083% nebulizer solution USE 1 VIAL VIA NEBULIZER EVERY 4 HOURS AS NEEDED Yes Automatic Reconciliation, Ar   azelastine (ASTELIN) 0.1 % nasal spray 1 spray by Nasal route 2 times daily Yes Automatic Reconciliation, Ar   cycloSPORINE (RESTASIS) 0.05 % ophthalmic emulsion Apply 1 drop to eye 2 times daily Yes Automatic Reconciliation, Ar   EPINEPHrine (EPIPEN) 0.3 MG/0.3ML SOAJ injection INJECT CONTENTS OF 1 PEN AS NEEDED FOR ALLERGIC REACTION AS DIRECTED Yes Automatic Reconciliation, Ar   Hyoscyamine Sulfate SL 0.125 MG SUBL Place 1 tablet under the tongue every 4 hours Yes Automatic Reconciliation, Ar   mometasone-formoterol (DULERA) 100-5 MCG/ACT inhaler Inhale 2 puffs into the lungs 2 times daily Yes Automatic Reconciliation, Ar   nystatin-triamcinolone (MYCOLOG II) 555804-3.1 UNIT/GM-% cream as needed Yes Automatic Reconciliation, Ar   valACYclovir (VALTREX) 1 g tablet Take 1 tablet by mouth daily Yes Automatic Reconciliation, Ar       CareTeam (Including outside providers/suppliers regularly involved in providing care):   Patient Care Team:  DANN Elam MD as PCP - General (Internal Medicine)  DANN Elam MD as PCP - Empaneled Provider  Varsha Cervantes APRN - NP as Nurse Practitioner     Recommendations for Preventive Services Due: see orders and patient

## 2025-04-23 NOTE — PATIENT INSTRUCTIONS
Learning About Being Active as an Older Adult  Why is being active important as you get older?     Being active is one of the best things you can do for your health. And it's never too late to start. Being active--or getting active, if you aren't already--has definite benefits. It can:  Give you more energy,  Keep your mind sharp.  Improve balance to reduce your risk of falls.  Help you manage chronic illness with fewer medicines.  No matter how old you are, how fit you are, or what health problems you have, there is a form of activity that will work for you. And the more physical activity you can do, the better your overall health will be.  What kinds of activity can help you stay healthy?  Being more active will make your daily activities easier. Physical activity includes planned exercise and things you do in daily life. There are four types of activity:  Aerobic.  Doing aerobic activity makes your heart and lungs strong.  Includes walking, dancing, and gardening.  Aim for at least 2½ hours spread throughout the week.  It improves your energy and can help you sleep better.  Muscle-strengthening.  This type of activity can help maintain muscle and strengthen bones.  Includes climbing stairs, using resistance bands, and lifting or carrying heavy loads.  Aim for at least twice a week.  It can help protect the knees and other joints.  Stretching.  Stretching gives you better range of motion in joints and muscles.  Includes upper arm stretches, calf stretches, and gentle yoga.  Aim for at least twice a week, preferably after your muscles are warmed up from other activities.  It can help you function better in daily life.  Balancing.  This helps you stay coordinated and have good posture.  Includes heel-to-toe walking, francisco chi, and certain types of yoga.  Aim for at least 3 days a week.  It can reduce your risk of falling.  Even if you have a hard time meeting the recommendations, it's better to be more active

## 2025-04-24 ENCOUNTER — RESULTS FOLLOW-UP (OUTPATIENT)
Facility: CLINIC | Age: 80
End: 2025-04-24

## 2025-04-24 NOTE — TELEPHONE ENCOUNTER
Your vitamin D level is in normal range.  Your urine analysis is clear.  Your cholesterol profile is stable.  Your total cholesterol is mildly elevated however your triglyceride and HDL cholesterol remain excellent.  Your A1c which is an average of your glucose is borderline at 5.8%.  Your glucose was normal at 82.  Your creatinine is 1.18 which is stable with a calculated glomerular filtration rate just below normal range at 47.  This suggests mild renal insufficiency which is more likely related to your age and lower body mass rather than an actual decrease in your kidney function.  Your complete blood count is stable.

## 2025-06-18 DIAGNOSIS — I10 ESSENTIAL HYPERTENSION: ICD-10-CM

## 2025-06-19 RX ORDER — VALSARTAN 160 MG/1
160 TABLET ORAL DAILY
Qty: 90 TABLET | Refills: 1 | Status: SHIPPED | OUTPATIENT
Start: 2025-06-19

## 2025-06-19 NOTE — TELEPHONE ENCOUNTER
PCP: DANN Elam MD    Last appt: 4/23/2025    Future Appointments   Date Time Provider Department Center   10/24/2025  8:30 AM DANN Elam MD [facility] DEP       Requested Prescriptions     Pending Prescriptions Disp Refills    valsartan (DIOVAN) 160 MG tablet [Pharmacy Med Name: VALSARTAN TAB 160MG] 90 tablet 1     Sig: TAKE 1 TABLET DAILY

## 2025-07-14 ENCOUNTER — OFFICE VISIT (OUTPATIENT)
Facility: CLINIC | Age: 80
End: 2025-07-14
Payer: COMMERCIAL

## 2025-07-14 VITALS
SYSTOLIC BLOOD PRESSURE: 136 MMHG | TEMPERATURE: 98.5 F | BODY MASS INDEX: 25.56 KG/M2 | HEART RATE: 70 BPM | OXYGEN SATURATION: 71 % | WEIGHT: 135.3 LBS | DIASTOLIC BLOOD PRESSURE: 74 MMHG

## 2025-07-14 DIAGNOSIS — J01.00 ACUTE NON-RECURRENT MAXILLARY SINUSITIS: Primary | ICD-10-CM

## 2025-07-14 PROCEDURE — 1123F ACP DISCUSS/DSCN MKR DOCD: CPT | Performed by: INTERNAL MEDICINE

## 2025-07-14 PROCEDURE — 3075F SYST BP GE 130 - 139MM HG: CPT | Performed by: INTERNAL MEDICINE

## 2025-07-14 PROCEDURE — 99213 OFFICE O/P EST LOW 20 MIN: CPT | Performed by: INTERNAL MEDICINE

## 2025-07-14 PROCEDURE — 3078F DIAST BP <80 MM HG: CPT | Performed by: INTERNAL MEDICINE

## 2025-07-14 RX ORDER — AZITHROMYCIN 250 MG/1
TABLET, FILM COATED ORAL
Qty: 6 TABLET | Refills: 0 | Status: SHIPPED | OUTPATIENT
Start: 2025-07-14 | End: 2025-07-24

## 2025-07-14 NOTE — PROGRESS NOTES
Subjective:   Saige Kuo is a 80 y.o. female      Chief Complaint   Patient presents with    Pharyngitis     Pt comes in today complaining of a sore throat for 2 weeks. Pt states that she thinks it allergy related,pt states that she alos has post nasal drip. Pt states that her sore throat is worse at night and mostly on the left side of her throat.         History of present illness: She presents complaint a lot of sinus congestion as well as a sore throat that has been bothering her for about 2 weeks.  She thinks some of it is allergy related.  She has noted that she used Astepro for her nasal drip and she notes that the drip seems to be worse at night particular on the left side of her throat.  She has noted no fevers or chills.    Patient Active Problem List   Diagnosis    Hematochezia    Bradycardia    Mixed hyperlipidemia    Hyperlipidemia    Allergic rhinitis    Cataract, bilateral    Crohn's disease (HCC)    Moderate persistent asthma without complication    Osteopenia of multiple sites    Degenerative arthritis    Asthma    Pneumonia    Essential hypertension    GERD (gastroesophageal reflux disease)    Osteopenia    Heart murmur, systolic      Past Medical History:   Diagnosis Date    Allergic rhinitis 06/28/2017    Asthma 06/28/2017    Impressio: continue nebs and Dulera, prednisone taper    Bradycardia 06/28/2017    Impression: asymptomatic, continue Bystolic    Cataract, bilateral 06/28/2017    Impression: low risk for elective surgical procedure, proceed without further risk stratification, EKG shows sinus bebeto without acute ST-T changes which is her baseline    Crohn's disease (HCC) 06/28/2017    Impression: GI following    Degenerative arthritis 06/28/2017    Hair loss 06/28/2017    Impression: refer to derm    Herpes Dx 2015    no outbreak as of 5/2/16    Hyperlipidemia 06/28/2017    Hypertension     Hypokalemia 06/28/2017    LTBI (latent tuberculosis infection)     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

## 2025-07-31 ENCOUNTER — TRANSCRIBE ORDERS (OUTPATIENT)
Facility: HOSPITAL | Age: 80
End: 2025-07-31

## 2025-07-31 DIAGNOSIS — Z12.31 OTHER SCREENING MAMMOGRAM: Primary | ICD-10-CM
